# Patient Record
Sex: MALE | Race: BLACK OR AFRICAN AMERICAN | Employment: FULL TIME | URBAN - METROPOLITAN AREA
[De-identification: names, ages, dates, MRNs, and addresses within clinical notes are randomized per-mention and may not be internally consistent; named-entity substitution may affect disease eponyms.]

---

## 2017-05-15 ENCOUNTER — HOSPITAL ENCOUNTER (INPATIENT)
Facility: CLINIC | Age: 27
LOS: 1 days | Discharge: HOME | DRG: 882 | End: 2017-05-16
Attending: EMERGENCY MEDICINE | Admitting: PSYCHIATRY & NEUROLOGY
Payer: MEDICAID

## 2017-05-15 ENCOUNTER — TELEPHONE (OUTPATIENT)
Dept: BEHAVIORAL HEALTH | Facility: CLINIC | Age: 27
End: 2017-05-15

## 2017-05-15 DIAGNOSIS — R45.851 SUICIDAL IDEATION: ICD-10-CM

## 2017-05-15 LAB
AMPHETAMINES UR QL SCN: ABNORMAL
ANION GAP SERPL CALCULATED.3IONS-SCNC: 6 MMOL/L (ref 3–14)
APAP SERPL-MCNC: NORMAL MG/L (ref 10–20)
BARBITURATES UR QL: ABNORMAL
BASOPHILS # BLD AUTO: 0 10E9/L (ref 0–0.2)
BASOPHILS NFR BLD AUTO: 0.5 %
BENZODIAZ UR QL: ABNORMAL
BUN SERPL-MCNC: 8 MG/DL (ref 7–30)
CALCIUM SERPL-MCNC: 8.7 MG/DL (ref 8.5–10.1)
CANNABINOIDS UR QL SCN: ABNORMAL
CHLORIDE SERPL-SCNC: 107 MMOL/L (ref 94–109)
CO2 SERPL-SCNC: 28 MMOL/L (ref 20–32)
COCAINE UR QL: ABNORMAL
CREAT SERPL-MCNC: 0.82 MG/DL (ref 0.66–1.25)
DIFFERENTIAL METHOD BLD: ABNORMAL
EOSINOPHIL # BLD AUTO: 0 10E9/L (ref 0–0.7)
EOSINOPHIL NFR BLD AUTO: 0.6 %
ERYTHROCYTE [DISTWIDTH] IN BLOOD BY AUTOMATED COUNT: 14.8 % (ref 10–15)
ETHANOL SERPL-MCNC: 0.11 G/DL
GFR SERPL CREATININE-BSD FRML MDRD: NORMAL ML/MIN/1.7M2
GLUCOSE SERPL-MCNC: 85 MG/DL (ref 70–99)
HCT VFR BLD AUTO: 36.8 % (ref 40–53)
HGB BLD-MCNC: 12.6 G/DL (ref 13.3–17.7)
IMM GRANULOCYTES # BLD: 0 10E9/L (ref 0–0.4)
IMM GRANULOCYTES NFR BLD: 0.3 %
INTERPRETATION ECG - MUSE: NORMAL
LYMPHOCYTES # BLD AUTO: 2 10E9/L (ref 0.8–5.3)
LYMPHOCYTES NFR BLD AUTO: 31.1 %
MCH RBC QN AUTO: 30.6 PG (ref 26.5–33)
MCHC RBC AUTO-ENTMCNC: 34.2 G/DL (ref 31.5–36.5)
MCV RBC AUTO: 89 FL (ref 78–100)
MONOCYTES # BLD AUTO: 0.3 10E9/L (ref 0–1.3)
MONOCYTES NFR BLD AUTO: 4.2 %
NEUTROPHILS # BLD AUTO: 4 10E9/L (ref 1.6–8.3)
NEUTROPHILS NFR BLD AUTO: 63.3 %
NRBC # BLD AUTO: 0 10*3/UL
NRBC BLD AUTO-RTO: 0 /100
OPIATES UR QL SCN: ABNORMAL
PCP UR QL SCN: ABNORMAL
PLATELET # BLD AUTO: 337 10E9/L (ref 150–450)
POTASSIUM SERPL-SCNC: 4 MMOL/L (ref 3.4–5.3)
RBC # BLD AUTO: 4.12 10E12/L (ref 4.4–5.9)
SODIUM SERPL-SCNC: 141 MMOL/L (ref 133–144)
TSH SERPL DL<=0.005 MIU/L-ACNC: 0.5 MU/L (ref 0.4–4)
WBC # BLD AUTO: 6.4 10E9/L (ref 4–11)

## 2017-05-15 PROCEDURE — 80307 DRUG TEST PRSMV CHEM ANLYZR: CPT | Performed by: EMERGENCY MEDICINE

## 2017-05-15 PROCEDURE — 25000132 ZZH RX MED GY IP 250 OP 250 PS 637: Performed by: PSYCHIATRY & NEUROLOGY

## 2017-05-15 PROCEDURE — 12400006 ZZH R&B MH INTERMEDIATE

## 2017-05-15 PROCEDURE — 84443 ASSAY THYROID STIM HORMONE: CPT | Performed by: EMERGENCY MEDICINE

## 2017-05-15 PROCEDURE — 93005 ELECTROCARDIOGRAM TRACING: CPT

## 2017-05-15 PROCEDURE — 80329 ANALGESICS NON-OPIOID 1 OR 2: CPT | Performed by: EMERGENCY MEDICINE

## 2017-05-15 PROCEDURE — 99285 EMERGENCY DEPT VISIT HI MDM: CPT | Mod: 25

## 2017-05-15 PROCEDURE — 80320 DRUG SCREEN QUANTALCOHOLS: CPT | Performed by: EMERGENCY MEDICINE

## 2017-05-15 PROCEDURE — 80048 BASIC METABOLIC PNL TOTAL CA: CPT | Performed by: EMERGENCY MEDICINE

## 2017-05-15 PROCEDURE — 90791 PSYCH DIAGNOSTIC EVALUATION: CPT

## 2017-05-15 PROCEDURE — 85025 COMPLETE CBC W/AUTO DIFF WBC: CPT | Performed by: EMERGENCY MEDICINE

## 2017-05-15 RX ORDER — HYDROXYZINE HYDROCHLORIDE 25 MG/1
25-50 TABLET, FILM COATED ORAL EVERY 4 HOURS PRN
Status: DISCONTINUED | OUTPATIENT
Start: 2017-05-15 | End: 2017-05-16 | Stop reason: HOSPADM

## 2017-05-15 RX ORDER — TRAZODONE HYDROCHLORIDE 50 MG/1
50-100 TABLET, FILM COATED ORAL
Status: DISCONTINUED | OUTPATIENT
Start: 2017-05-15 | End: 2017-05-16 | Stop reason: HOSPADM

## 2017-05-15 RX ORDER — ACETAMINOPHEN 325 MG/1
650 TABLET ORAL EVERY 4 HOURS PRN
Status: DISCONTINUED | OUTPATIENT
Start: 2017-05-15 | End: 2017-05-16 | Stop reason: HOSPADM

## 2017-05-15 RX ADMIN — HYDROXYZINE HYDROCHLORIDE 50 MG: 25 TABLET ORAL at 18:51

## 2017-05-15 RX ADMIN — ACETAMINOPHEN 650 MG: 325 TABLET, FILM COATED ORAL at 19:19

## 2017-05-15 NOTE — ED PROVIDER NOTES
"  History     Chief Complaint:  Psychiatric Evaluation      The history is provided by the EMS personnel and the patient. History limited by: patient not providing history.      Uri Valladares is a 26 year old male with history of anxiety and depression who presents via EMS for psychiatric evaluation.  Per EMS patient contacted his mother and told her he was feeling suicidal so she contacted the police department.  Here in the emergency department patient will not answer questions or provide any history.  He does nod his head yes when asked if suicidal and no when asked if he has harmed himself.  No additional history obtainable at this time. Mother did not present to the emergency department.     Allergies:  No known drug allergies      Medications:    The patient is not currently taking any prescribed medications.     Past Medical History:    Anxiety  Depression  Marijuana abuse  Facial bone fractures    Past Surgical History:    History reviewed. No pertinent surgical history.     Family History:    History reviewed. No pertinent family history.      Social History:  Presents via EMS   Tobacco use: 0.50 PPD  Alcohol use: 4x weekly  PCP: [facility]    Marital Status:  Single        Review of Systems   Psychiatric/Behavioral: Positive for suicidal ideas. Negative for self-injury.       Physical Exam     Patient Vitals for the past 24 hrs:   BP Temp Temp src Pulse Heart Rate Resp SpO2 Height Weight   05/15/17 1652 132/76 97.8  F (36.6  C) Oral 65 - 16 - 1.905 m (6' 3\") 67.8 kg (149 lb 8 oz)   05/15/17 1039 119/72 98.5  F (36.9  C) Oral - 78 14 100 % - -       Physical Exam  General: Resting comfortably on the gurney, puts a pillow over his head  Eyes:  The pupils are equal and round  ENT:    Moist mucous membranes  Neck:  Normal range of motion  CV:  Regular rate and rhythm    Skin warm and well perfused   Resp:  Lungs are clear    Non-labored    No rales    No wheezing  MS:  Normal muscular " tone  Skin:  No rash or acute skin lesions noted  Neuro:   Awake, alert.      Minimal speech - wouldn't talk    Face is symmetric.     Moves all extremities  Psych:  Flat affect.  Appropriate interactions.     Emergency Department Course   ECG (11:19:56):  Rate 82 bpm. VA interval 178. QRS duration 90. QT/QTc 356/415. P-R-T axes 76 32 55. NSR.  Normal ECG.  Agree with computer.  No significant change when compared to EKG dated 5/4/16.  Interpreted at 1127 by Yoanna Rodriguez MD.    Laboratory:  BMP: WNL (Creatinine 0.82)     Acetaminophen level: <2  ETOH: 0.11 (H)     Emergency Department Course:  The patient arrived in the emergency department via EMS.  Past medical records, nursing notes, and vitals reviewed.  1050: I performed an exam of the patient and obtained history as documented above. GCS 15   Above workup undertaken.  1400: I rechecked the patient. Explained findings to patient. He continues to refuse to speak with me but will speak with DEC.  I had DEC evaluate the patient.   I personally reviewed the laboratory results with the Patient and answered all related questions prior to admission.   Patient will be admitted to a psychiatric bed under the care of Dr. Welch.      Impression & Plan    Medical Decision Making:  Uri Valladares is a 26 year old male with history of depression and anxiety who presents to the emergency department with suicidal thoughts.  Patient has an unremarkable physical exam and normal vital signs but he is minimally communicative here in the emergency department.  He is alert and will nod his head yes or no to some questions though will not answer any of the questions with words.  He did nod his head yes to being suicidal and did nod his head no to any attempt at harming himself.  Laboratory values obtained to rule out possible ingestion given minimal communication and his alcohol level was elevated at 0.11.  Patient reevaluated a few hours later and continues to not  give me any information though said he will speak to DEC at this time.  DEC able to evaluate him and again did not speak very much but did give enough information that DEC felt he should be admitted. He continues to nod his head yes to being suicidal. Flat affect, minimal communication, concern for severe depression and suicidal thoughts.  Will admit patient to inpatient psychiatry.        Diagnosis:    ICD-10-CM    1. Suicidal ideation R45.851      Disposition:  Patient will be admitted to a psychiatric bed under the care of Dr. Welch.      Forrest Stiles  5/15/2017    EMERGENCY DEPARTMENT    I, Forrest Stiles, am serving as a scribe at 11:25 AM on 5/15/2017 to document services personally performed by Yoanna Rodriguez MD based on my observations and the provider's statements to me.       Yoanna Rodriguez MD  05/15/17 1808

## 2017-05-15 NOTE — PROGRESS NOTES
05/15/17 1720   Patient Belongings   Patient Belongings other (see comments)   Disposition of Belongings searched and placed in patient's locker   Belongings Search Yes   Clothing Search Yes   Second Staff no     1 black Nike hat  1 grey t-shirt  1 maroon sweatshirt  1 black jeans   1   A pair of black shoes with laces  1 pair of socks  1 key and garage button  3 cigarettes  1 lighter  [place] 's Licence  2 brown leather wallets  Business cards  Loose change (coins)  iphone 6 plus        Security Envelope #[id number]  Social security card   [employer] Visa *[phone]  [employer] Visa *[phone]  [employer] Visa *[phone]  [employer] Visa *[phone]  [employer] Wal[employer] Master Card *[phone]    ADMISSION:  I am responsible for any personal items that are not sent to the safe or pharmacy. [facility] is not responsible for loss, theft or damage of any property in my possession.    Patient Signature _____________________ Date/Time _____________________    Staff Signature _______________________ Date/Time _____________________    [facility] Staff person, if patient is unable/unwilling to sign  ___________________________________ Date/Time _____________________    DISCHARGE:  My personal items have been returned to me.   Patient Signature _____________________ Date/Time _____________________

## 2017-05-15 NOTE — ED NOTES
Patient is now angry that he is going to be admitted. He denies suicidal thoughts now. He was informed of 72 hour hold status and says he does not think this is right when all he did was refuse to answer questions. Given copy of patient bill of rights. Patient also ate a turkey sandwich meal prior to transport to unit 77.

## 2017-05-15 NOTE — TELEPHONE ENCOUNTER
S - Jose CLARK  calling with clinical for a 25 yo male who is suicidal.      B - Pt is depressed, suicidal.  Pt's mother who lives in [place] called 911 because she was concerned he was going to kill himself.  Police found him curled up on the floor.  Pt was initially not responding to  in ED, just nodded when asked if he wanted to die.   did get some information from pt.  Pt has been depressed for a while.  Pt denies being hospitalized before for psych.  Pt wouldn't disclose if he had a suicide plan.  Pt admitted he would rather be dead.  Pt did say that he quit going to work 2 weeks ago, wouldn't state why.  Pt only really answering in yes/no format to assessment.  Pt had PAM 0.11 (updated value) upon admission.  Unsure if patient drinking on regular basis.      A - pt will be placed on hold when placement found per Jose Welch [facility]

## 2017-05-15 NOTE — IP AVS SNAPSHOT
MRN:[id number]                      After Visit Summary   5/15/2017    Uri Valladares    MRN: [id number]           Thank you!     Thank you for choosing [facility] for your care. Our goal is always to provide you with excellent care.        Patient Information     Date Of Birth          1990        Designated Caregiver       Most Recent Value    Caregiver    Will someone help with your care after discharge? no      About your hospital stay     You were admitted on:  May 15, 2017 You last received care in the:  [facility]    You were discharged on:  May 16, 2017       Who to Call     For medical emergencies, please call 911.  For non-urgent questions about your medical care, please call your primary care provider or clinic, [phone]          Attending Provider     Provider Specialty    Yoanna Rodriguez MD Emergency Medicine    [facility]       Primary Care Provider Office Phone # Fax #    [facility] [phone] [address] [facility] [address]        Further instructions from your care team       Behavioral Discharge Planning and Instructions    Summary: Admitted to hospital with suicidal ideation.    Main Diagnosis: Depression, NOS and polysubstance use disorder     Major Treatments, Procedures and Findings: psychiatric assessment    Symptoms to Report: feeling more aggressive, increased confusion, mood getting worse or thoughts of suicide    Lifestyle Adjustment: Develop and follow safety plan. Abstain from use of alcohol and chemicals- if unable, seek a rule 25 chemical dependency evaluation.   Follow through with counseling to help you cope with loss and plan for your future.    Psychiatry Follow-up:     [address] [address] [phone] fax: [phone]  Or  [facility]  [address] [place] [address] [address]  [place], [place]   "[address]  Phone:  [phone] / Fax:  [phone]    If your MA is in place and you have an MA number, you can make an appointment at any clinic. If you have no proof of insurance, you may ask them to screen you for [facility], then once you have the proof of insurance, can continue on that basis.    Another idea is to call STEPHENIE ([place] on [place] Illness) and ask to be connected to a support group: 632.336.7889.      Rule 25 evaluation for chemical health is available through [facility]- call  for information and scheduling.    Resources:   Crisis Intervention: 991.596.4053 or 835-991-5141 (TTY: 745.378.6232).  Call anytime for help.  [place] [employer] [place]ness (www.[place].stephenie.org): 602.805.3078 or 578-508-5824.  Alcoholics Anonymous (www.alcoholics-anonymous.org): Check your phone book for your local chapter.  National Suicide Prevention Line (www.mentalhealthmn.org): 057-938-IOGK (0106)    General Medication Instructions:   See your medication sheet(s) for instructions.   Take all medicines as directed.  Make no changes unless your doctor suggests them.   Go to all your doctor visits.  Be sure to have all your required lab tests. This way, your medicines can be refilled on time.  Do not use any drugs not prescribed by your doctor.  Avoid alcohol.      Pending Results     No orders found for last 3 day(s).            Statement of Approval     Ordered          05/16/17 9125  I have reviewed and agree with all the recommendations and orders detailed in this document.  EFFECTIVE NOW     Approved and electronically signed by:  Ivan Mujica MD             Admission Information     Date & Time Provider Department Dept. Phone    5/15/2017 Ivan Mujica MD [facility] [phone]      Your Vitals Were     Blood Pressure Pulse Temperature Respirations Height Weight    127/86 61 97.6  F (36.4  C) (Oral) 16 1.905 m (6' 3\") 67.8 kg (149 lb 8 oz)    Pulse " "Oximetry BMI (Body Mass Index)                100% 18.69 kg/m2          MyChart Information     [employer] lets you send messages to your doctor, view your test results, renew your prescriptions, schedule appointments and more. To sign up, go to [email or web address] . Click on \"Log in\" on the left side of the screen, which will take you to the Welcome page. Then click on \"Sign up Now\" on the right side of the page.     You will be asked to enter the access code listed below, as well as some personal information. Please follow the directions to create your username and password.     Your access code is: [email or web address]  Expires: 2017 11:19 AM     Your access code will  in 90 days. If you need help or a new code, please call your [facility] [facility] clinic or [phone].        Care EveryWhere ID     This is your Care EveryWhere ID. This could be used by other organizations to access your [facility] [place] [facility] medical records  [id number]           Review of your medicines      Notice     You have not been prescribed any medications.             Protect others around you: Learn how to safely use, store and throw away your medicines at www.disposemymeds.org.             Medication List: This is a list of all your medications and when to take them. Check marks below indicate your daily home schedule. Keep this list as a reference.      Notice     You have not been prescribed any medications.      "

## 2017-05-15 NOTE — PLAN OF CARE
"Problem: Depressive Symptoms  Goal: Depressive Symptoms  Signs and symptoms of listed problems will be absent or manageable.   26 year old male received from the ER on a 72 hour hold due to Depression with suicidal ideation. Pt. angry about being admitted.Very evasive and inconsistent while providing history during admit interview. Reports that he has periods of confusion . Reports issues with short term memory loss. Long delays before answering certain questions. Reports that he lives alone but later states he lives with on/off girlfriend and his child. Reports \"not working\" for \"a long time\" later reporting he \"lost\" his job a couple of weeks ago. Does admit to depression and suicidal ideation. Denies current plan. Does admit to 1 previous attempt - 1 year ago but states \" I knew I didn't take a lethal dose of the medication\". Reports trouble achieving and maintaining sleep. Reports recent lack of appetite with unknown amount of weight loss. Reports lack of support system-extended family in [place].  Admits to alcohol and marijuana use-denies cocaine use despite urine toxicology being positive for cocaine.     Nursing assessment complete including patient and medication profiles. Risk assessments completed addressing suicide,fall,skin,nutrition and safety issues. Care plan initiated. Assessments reviewed with physician and admit orders received. Welcome packet reviewed with patient. Information reviewed includes getting emergency help, preventing infections, understanding your care, using medication safely, reducing falls, preventing pressure ulcers, smoking cessation, powerful choices and Patients Bill of Rights. Pt. given tour of the unit and instruction on use of facility including emergency call light. Program schedule reviewed with patient. Questions regarding the unit addressed. Pt. Search completed and belongings inventoried.                    "

## 2017-05-15 NOTE — PHARMACY-ADMISSION MEDICATION HISTORY
Admission Medication History    Admission medication history interview status for the 5/15/2017 admission is complete.    Patient reports using no medications prior to this admission.      Kody Patrick, LorenzoD

## 2017-05-15 NOTE — ED NOTES
"Patient says he is unable to provide urine sample. Given some water to drink. He then came out and asked to speak to nurse and said \"I am ready to go home.\"   "

## 2017-05-15 NOTE — ED NOTES
Bed: [facility]  Expected date:   Expected time:   Means of arrival:   Comments:  [place] - 19's psych eval on hold eta 1030

## 2017-05-15 NOTE — IP AVS SNAPSHOT
[place] [address] [place] [address]    Phone:  [phone]                                       After Visit Summary   5/15/2017    Uri Valladares    MRN: [id number]           After Visit Summary Signature Page     I have received my discharge instructions, and my questions have been answered. I have discussed any challenges I see with this plan with the nurse or doctor.    ..........................................................................................................................................  Patient/Patient Representative Signature      ..........................................................................................................................................  Patient Representative Print Name and Relationship to Patient    ..................................................               ................................................  Date                                            Time    ..........................................................................................................................................  Reviewed by Signature/Title    ...................................................              ..............................................  Date                                                            Time

## 2017-05-16 VITALS
DIASTOLIC BLOOD PRESSURE: 86 MMHG | HEART RATE: 61 BPM | RESPIRATION RATE: 16 BRPM | OXYGEN SATURATION: 100 % | HEIGHT: 75 IN | BODY MASS INDEX: 18.59 KG/M2 | TEMPERATURE: 97.6 F | WEIGHT: 149.5 LBS | SYSTOLIC BLOOD PRESSURE: 127 MMHG

## 2017-05-16 PROCEDURE — 99207 ZZC NO CHARGE VISIT/PATIENT NOT SEEN: CPT | Performed by: PHYSICIAN ASSISTANT

## 2017-05-16 NOTE — H&P
"[facility] Psychiatric H&P Note       Initial History   The patient's care was discussed with the treatment team and chart notes were reviewed.     Patient examined for psychiatric admission.     IDENTIFICATION    Patient is a 26 year old single male with one nine month old son. Pt sees PCP Varsha Fitch. He has never seen a psychiatrist or therapist. Pt seen [facility] for depression with SI.    HISTORY OF PRESENT ILLNESS    Uri Valladares is a 26 year old male with history of anxiety and depression who presented to the [facility] ED via EMS for psychiatric evaluation. Per EMS pt contacted his mother and told her he was feeling suicidal so she contacted the police department. On interview, stated that his mother contacted 911 and that he did not actually want to complete suicide. He reports that he was alone in his apartment and crying on the phone to her, prompting her to contact the police. Pt has severely depressed mood, motivation poor, concentration poor, low energy, worthless, sadness, low appetite, low weight without SI. No prior suicide attempts. He endorses poor sleep architecture, issues falling asleep. Pt is an anxious person, a worrier, gets headaches from his anxiety. Pt confirms having panic attacks, but he cannot describe them as it was a while ago. Pt has had a manic episode with elevated and expansive mood, a surplus of energy, grandiosity, decreased need for sleep, pressured speech, flight of ideas, increase in goal-oriented activity, increase in psychomotor agitation all of which were to the point of psychosocial impairment. Pt endorses that episode was not secondary to chemical use and lasted at least one week in time. He denies hallucinations or past trauma. He also reports that his fiance of two years left a week ago, likely exacerbating his depression.    CHEMICAL DEPENDENCY HISTORY    He states that he drinks a \"couple shots\" of alcohol every other day. He has thought of " "cutting back on his alcohol use. Some nicotine use. He claims he does not use any other illicit substances. However, utox positive for cocaine, cannabis, and EtOH of 0.11 on admission. He states that he has been using marijuana at least once a week and used cocaine the night before admission.    PAST  PSYCHIATRIC HISTORY    He has never seen a psychiatrist. He has never taken psychotropic medications.    FAMILY HISTORY    Unknown, \"I have no idea.\"    SOCIAL HISTORY    Pt grew up in [place]. He then moved to [place] and [place]. He has two older brothers. He was raised by his mother. He does not have a relationship with his father. He is currently unemployed, he states that he is mainly a . He has a nine month son and is involved with his care. He is now single, his fiance left with his child a week ago.     Medications     No prescriptions prior to admission.       Scheduled Medications:     PRNs:  hydrOXYzine, traZODone, acetaminophen      Allergies    No Known Allergies     Previous Medical History     Past Medical History:   Diagnosis Date     Depressive disorder         Medical Review of Systems   /86  Pulse 61  Temp 97.6  F (36.4  C) (Oral)  Resp 16  Ht 1.905 m (6' 3\")  Wt 67.8 kg (149 lb 8 oz)  SpO2 100%  BMI 18.69 kg/m2  Body mass index is 18.69 kg/(m^2).  Previous 10-point ROS completed by Yoanna Rodriguez MD on 5/15/17 reviewed by Ivan Mujica MD on May 16, 2017 and is unchanged except for those problems mentioned within the HPI.     Mental Status Examination     Appearance Lying in bed, dressed in scrubs. Appears stated age.   Attitude Cooperative   Orientation Oriented to person, place, time   Eye Contact Poor   Speech Regular rate, rhythm, volume and tone   Language Normal   Psychomotor Behavior Normal   Mood Depressed   Affect Flat   Thought Process Goal-Oriented, Intact   Associations Intact   Thought Content Patient is currently negative for suicide ideation, " negative for plan or intent, able to contract no self harm and identify barriers to suicide.  Negative for obsessions, compulsions or psychosis.      Fund of Knowledge Average   Insight Fair   Judgement Fair   Attention Span & Concentration Alert   Recent & Remote Memory Intact   Gait Normal      Labs   Labs reviewed.  Recent Results (from the past 24 hour(s))   EKG 12 lead    Collection Time: 05/15/17 11:19 AM   Result Value Ref Range    Interpretation ECG Click View Image link to view waveform and result    Basic metabolic panel    Collection Time: 05/15/17 11:27 AM   Result Value Ref Range    Sodium 141 133 - 144 mmol/L    Potassium 4.0 3.4 - 5.3 mmol/L    Chloride 107 94 - 109 mmol/L    Carbon Dioxide 28 20 - 32 mmol/L    Anion Gap 6 3 - 14 mmol/L    Glucose 85 70 - 99 mg/dL    Urea Nitrogen 8 7 - 30 mg/dL    Creatinine 0.82 0.66 - 1.25 mg/dL    GFR Estimate >90  Non  GFR Calc   >60 mL/min/1.7m2    GFR Estimate If Black >90   GFR Calc   >60 mL/min/1.7m2    Calcium 8.7 8.5 - 10.1 mg/dL   Acetaminophen level    Collection Time: 05/15/17 11:27 AM   Result Value Ref Range    Acetaminophen Level <2  Therapeutic range: 10-20 mg/L   mg/L   Alcohol level blood    Collection Time: 05/15/17 11:27 AM   Result Value Ref Range    Ethanol g/dL 0.11 (H) <0.01 g/dL   CBC with platelets differential    Collection Time: 05/15/17 11:27 AM   Result Value Ref Range    WBC 6.4 4.0 - 11.0 10e9/L    RBC Count 4.12 (L) 4.4 - 5.9 10e12/L    Hemoglobin 12.6 (L) 13.3 - 17.7 g/dL    Hematocrit 36.8 (L) 40.0 - 53.0 %    MCV 89 78 - 100 fl    MCH 30.6 26.5 - 33.0 pg    MCHC 34.2 31.5 - 36.5 g/dL    RDW 14.8 10.0 - 15.0 %    Platelet Count 337 150 - 450 10e9/L    Diff Method Automated Method     % Neutrophils 63.3 %    % Lymphocytes 31.1 %    % Monocytes 4.2 %    % Eosinophils 0.6 %    % Basophils 0.5 %    % Immature Granulocytes 0.3 %    Nucleated RBCs 0 0 /100    Absolute Neutrophil 4.0 1.6 - 8.3 10e9/L     Absolute Lymphocytes 2.0 0.8 - 5.3 10e9/L    Absolute Monocytes 0.3 0.0 - 1.3 10e9/L    Absolute Eosinophils 0.0 0.0 - 0.7 10e9/L    Absolute Basophils 0.0 0.0 - 0.2 10e9/L    Abs Immature Granulocytes 0.0 0 - 0.4 10e9/L    Absolute Nucleated RBC 0.0    TSH with free T4 reflex    Collection Time: 05/15/17 11:27 AM   Result Value Ref Range    TSH 0.50 0.40 - 4.00 mU/L   Drug abuse screen urine    Collection Time: 05/15/17  4:15 PM   Result Value Ref Range    Amphetamine Qual Urine  NEG     Negative   Cutoff for a negative amphetamine is 500 ng/mL or less.      Barbiturates Qual Urine  NEG     Negative   Cutoff for a negative barbiturate is 200 ng/mL or less.      Benzodiazepine Qual Urine  NEG     Negative   Cutoff for a negative benzodiazepine is 200 ng/mL or less.      Cannabinoids Qual Urine (A) NEG     Positive   Cutoff for a positive cannabinoid is greater than 50 ng/mL. This is an   unconfirmed screening result to be used for medical purposes only.      Cocaine Qual Urine (A) NEG     Positive   Cutoff for a positive cocaine is greater than 300 ng/mL. This is an unconfirmed   screening result to be used for medical purposes only.      Opiates Qualitative Urine  NEG     Negative   Cutoff for a negative opiate is 300 ng/mL or less.      PCP Qual Urine  NEG     Negative   Cutoff for a negative PCP is 25 ng/mL or less.          Impression   This is a 26 year old male with adjustment disorder with mixed anxiety and depressed mood and cannabis use disorder, alcohol use disorder, rule out cocaine use disorder. Dr. Mujica reviewed the side effects and complications of cannabis, particularly for an individual with a mental illness. Pt acknowledged. He does not believe that he uses chemicals to help with his depression. He states that his mother would like him to move to [place] with the rest of his family. Attempted to contact pt's mother, she did not answer her phone. Denies suicidal or homicidal ideation. Pt to  discharge today. Strongly encouraged pt to abstain from chemicals and attend AA meetings.   Diagnoses   1. Adjustment Disorder with Mixed Anxiety and Depressed Mood.  2. Cannabis Use Disorder, severe.  3. Alcohol Use Disorder, severe.  4. R/O Cocaine Use Disorder.     Plan     1. Explained side effects, benefits, and complications of medications to the patient, Pt gave verbal consent.  2. Medication changes: None.  3. Discussed treatment plan with patient and team.  4. Projected length of stay: Pt to discharge today.     Attestation:   Patient has been seen and evaluated by me, Ivan Mujica MD.    Patient ID:  Name: Uri Valladares  MRN: [id number]  Admission: 5/15/2017   YOB: 1990

## 2017-05-16 NOTE — PROGRESS NOTES
Per nursing, pt discharging home today. Chart reviewed, PMHx of depression, anxiety. Vitals WNL. Hgb 12.6, remainder of CBC, BMP, and TSH unremarkable. UDS positive for cocaine and cannabis, ETOH 0.11 on admission. Will defer formal H&P as pt is in the midst of discharge planning per primary service. If any acute medical issues should arise, happy to consult.

## 2017-05-16 NOTE — PROGRESS NOTES
Discharge teaching done. Pt denied SI.  Pt verbalized understanding of medications and out pt f/u [place] plan. No medications ordered. Belongings returned to pt at discharge. Pt discharged to home. Pt meeting his ride at [facility].

## 2017-05-16 NOTE — DISCHARGE INSTRUCTIONS
Behavioral Discharge Planning and Instructions    Summary: Admitted to hospital with suicidal ideation.    Main Diagnosis: Depression, NOS and polysubstance use disorder     Major Treatments, Procedures and Findings: psychiatric assessment    Symptoms to Report: feeling more aggressive, increased confusion, mood getting worse or thoughts of suicide    Lifestyle Adjustment: Develop and follow safety plan. Abstain from use of alcohol and chemicals- if unable, seek a rule 25 chemical dependency evaluation.   Follow through with counseling to help you cope with loss and plan for your future.    Psychiatry Follow-up:     [facility]  [address] [address] [phone] fax: [phone]  Or  [facility]  [address] [place] [address] [address]  [place]  [address]  Phone:  [phone] / Fax:  [phone]    If your MA is in place and you have an MA number, you can make an appointment at any clinic. If you have no proof of insurance, you may ask them to screen you for [facility], then once you have the proof of insurance, can continue on that basis.    Another idea is to call STEPHENIE ([employer]ness) and ask to be connected to a support group: [phone].      Rule 25 evaluation for chemical health is available through [facility]- call  for information and scheduling.    Resources:   Crisis Intervention: 558.934.2170 or 785-651-5185 (TTY: 669.320.3362).  Call anytime for help.  [employer] [place] [employer]ness (www.[place].stephenie.org): 483.839.5420 or 477-706-5196.  Alcoholics Anonymous (www.alcoholics-anonymous.org): Check your phone book for your local chapter.  National Suicide Prevention Line (www.mentalhealthmn.org): 496-736-LAQB (8954)    General Medication Instructions:   See your medication sheet(s) for instructions.   Take all medicines as directed.  Make no changes unless your doctor suggests them.   Go to all your doctor visits.  Be  sure to have all your required lab tests. This way, your medicines can be refilled on time.  Do not use any drugs not prescribed by your doctor.  Avoid alcohol.

## 2017-07-17 ENCOUNTER — HOSPITAL ENCOUNTER (EMERGENCY)
Facility: CLINIC | Age: 27
Discharge: HOME | End: 2017-07-17
Attending: EMERGENCY MEDICINE | Admitting: EMERGENCY MEDICINE
Payer: MEDICAID

## 2017-07-17 VITALS
HEIGHT: 74 IN | OXYGEN SATURATION: 100 % | RESPIRATION RATE: 16 BRPM | SYSTOLIC BLOOD PRESSURE: 134 MMHG | DIASTOLIC BLOOD PRESSURE: 86 MMHG | WEIGHT: 167 LBS | TEMPERATURE: 97.4 F | BODY MASS INDEX: 21.43 KG/M2

## 2017-07-17 DIAGNOSIS — K12.0 ULCER APHTHOUS ORAL: ICD-10-CM

## 2017-07-17 PROCEDURE — 87529 HSV DNA AMP PROBE: CPT | Performed by: EMERGENCY MEDICINE

## 2017-07-17 PROCEDURE — 99282 EMERGENCY DEPT VISIT SF MDM: CPT

## 2017-07-17 NOTE — ED AVS SNAPSHOT
[facility]    [address] [place] [address]    Phone:  [phone]    Fax:  [phone]                                       Uri Valladares   MRN: [id number]    Department:  [facility]   Date of Visit:  7/17/2017           Patient Information     Date Of Birth          1990        Your diagnoses for this visit were:     Ulcer aphthous oral        You were seen by Jessee South MD.      Follow-up Information     Follow up with Clinic, Varsha Rodriguez.    Contact information:    [address] [facility] [address]  [phone]          Discharge Instructions         Canker Sore    A canker sore (also called an aphthous ulcer) is a painful sore on the lining of the mouth. It is most painful during the first few days, and it lasts about 7 to 14 days before going away.  Causes  Canker sores are not cold sores or fever blisters. They are not contagious, so they are not spread by contact. The exact cause of canker sores is not clear, but there are a number of things that can trigger them in different people.    Mild injury, such as biting the inside of the mouth, lip, or cheek, or dental procedures    Stress    Poor diet, or lack of certain nutrients, including B vitamins and iron    Foods that can irritate the mouth, including tomatoes, citrus fruits, and some nuts (foods that are acidic or contain bitter substances called tannins)    Irritating chemicals, such as those in some toothpastes and mouthwashes    Certain chronic illnesses  Symptoms  Canker sores are found on the lining of the mouth. They can be inside the cheeks or lips, on the roof of the mouth, at the base of the gums, on the tongue, or in the back of the throat. Canker sores typically have these characteristics:    Small, flat (not raised) sores    Can be white or yellowish bumps that are red around the edges or have a red halo    Usually  small in size, roundish, and in groups    Accompanied by pain or burning  Canker sores do not leave a scar. But they usually come back.  Home care  The goals of canker sore treatment are to decrease the pain, speed healing, and prevent recurrence. No single treatment works for everyone. Try a number of techniques to see what works best.  General care    You may find that soft, easy-to-chew foods cause less pain. Use a straw to direct liquids away from the sore.    Use a soft-bristle toothbrush, and brush your teeth gently.    Avoid acidic, salty, or spicy foods.    Avoid injuring the inside of your mouth, or scraping your existing canker sores, by avoiding crusty and crunchy foods like french bread and chips.  Medicines  You can try over-the-counter medicines that cover the sores and numb them. This protects the sores while they heal and helps reduce pain.  Homemade rinses and solutions  You can use these solutions as mouth rinses. Spit them out after using them. You can also dab them on the sores. You can repeat these treatments as often as needed.    Rinse your mouth with saltwater.    Mix equal amounts of hydrogen peroxide and water. You can use it as a mouthwash or dab it on spots with a cotton swab. You can also add sodium bicarbonate to this to make a paste, and then dab it on spots.  Follow-up care  Follow up with your healthcare provider, or as advised.    If a culture was done, you will be notified if the treatment needs to be changed. You can call as directed for the results.  Call 911  Contact emergency services if any of these occur:    Trouble breathing    Inability to swallow    Extreme drowsiness or trouble awakening    Fainting or loss of consciousness    Rapid heart rate    Seizure    Stiff neck  When to seek medical advice  Call your healthcare provider right away if any of these occur:    You have a fever of 100.4 F (38 C) or higher.    You are pregnant.    You just had surgery or another medical  procedure, or you were just discharged from the hospital.    You are unable to eat or swallow due to pain.  Date Last Reviewed: 7/30/2015 2000-2017 [employer]. [address] [place] [address], [place] [address]. All rights reserved. This information is not intended as a substitute for professional medical care. Always follow your healthcare professional's instructions.          Understanding Canker Sores  Canker sores are small, painful sores inside the mouth. They occur most often on the tongue, gums, or insides of the cheeks. The medical term for canker sores is aphthous ulcers.  What causes a canker sore?  The exact cause of canker sores is not known, but they are linked to a number of conditions. These include:    An injury or irritation in the mouth, such as biting the inside of your cheek or braces rubbing    Allergy or sensitivity to certain foods or substances, such as citrus juice or some kinds of toothpaste    Poor nutrition    Emotional stress    Certain infections and illnesses  Canker sores tend to run in families.  What are the symptoms of a canker sore?  These are some common traits of canker sores:    Sores are open and grayish-yellow, surrounded by redness.    Sores are usually painful and sensitive to touch.    Canker sores may be preceded by a burning or tingling sensation a few hours to a few days before the sore appears.    Children and teens tend to get canker sores more often than adults.  How are canker sores treated?  Canker sores usually go away by themselves within 10 to 14 days. There is no cure for canker sores. Treatment focuses on relieving symptoms and shortening outbreaks. Treatments may include:    Prescription or over-the-counter skin treatments to apply to the sores. Steroids for your skin (topical) may protect the canker sores from further irritation and allow them to heal. Topical pain relief medicines may numb the area and make the sores less painful.    Certain types  of toothpaste. These do not contain sodium lauryl sulfate. This type of toothpaste may prevent further aggravation of canker sores.    Oral prescription medicines. These are used for severe cases to help relieve symptoms.    Prescription or over-the-counter pain medicines. These help with discomfort.  What are the complications of a canker sore?  Mouth sores that seem to be canker sores can be signs of a more serious illness. If you have other signs of illness along with mouth sores, you should talk with a healthcare provider. Canker sores can be so painful that they interfere with talking, eating, or drinking.  When should I call my healthcare provider?  Call your healthcare provider right away if you have any of these:    Canker sores that don t go away after 2 weeks    Canker sores that come back more than 3 times a year    Canker sores that are larger than about a half-inch across    Fever of 100.4 F (38 C) or higher, or as directed    Pain that gets worse    You aren t able to eat or drink because of painful sores    Symptoms that don t get better, or symptoms that get worse    New symptoms   Date Last Reviewed: 5/1/2016 2000-2017 [employer]. [address] [place] [address]. All rights reserved. This information is not intended as a substitute for professional medical care. Always follow your healthcare professional's instructions.          24 Hour Appointment Hotline       To make an appointment at any [facility], call [phone] ([phone]). If you don't have a family doctor or clinic, we will help you find one. [facility] clinics are conveniently located to serve the needs of you and your family.             Review of your medicines      Notice     You have not been prescribed any medications.            Procedures and tests performed during your visit     HSV 1 and 2 DNA by PCR      Orders Needing Specimen Collection     None      Pending Results     No orders found  from 7/15/2017 to 7/18/2017.            Pending Culture Results     No orders found from 7/15/2017 to 7/18/2017.            Pending Results Instructions     If you had any lab results that were not finalized at the time of your Discharge, you can call the ED Lab Result RN at [phone]. You will be contacted by this team for any positive Lab results or changes in treatment. The nurses are available 7 days a week from 10A to 6:30P.  You can leave a message 24 hours per day and they will return your call.        Test Results From Your Hospital Stay               Clinical Quality Measure: Blood Pressure Screening     Your blood pressure was checked while you were in the emergency department today. The last reading we obtained was  BP: 134/86 . Please read the guidelines below about what these numbers mean and what you should do about them.  If your systolic blood pressure (the top number) is less than 120 and your diastolic blood pressure (the bottom number) is less than 80, then your blood pressure is normal. There is nothing more that you need to do about it.  If your systolic blood pressure (the top number) is 120-139 or your diastolic blood pressure (the bottom number) is 80-89, your blood pressure may be higher than it should be. You should have your blood pressure rechecked within a year by a primary care provider.  If your systolic blood pressure (the top number) is 140 or greater or your diastolic blood pressure (the bottom number) is 90 or greater, you may have high blood pressure. High blood pressure is treatable, but if left untreated over time it can put you at risk for heart attack, stroke, or kidney failure. You should have your blood pressure rechecked by a primary care provider within the next 4 weeks.  If your provider in the emergency department today gave you specific instructions to follow-up with your doctor or provider even sooner than that, you should follow that instruction and not wait for up  "to 4 weeks for your follow-up visit.        Thank you for choosing [facility]       Thank you for choosing [facility] for your care. Our goal is always to provide you with excellent care. Hearing back from our patients is one way we can continue to improve our services. Please take a few minutes to complete the written survey that you may receive in the mail after you visit with us. Thank you!        [employer] Information     [employer] lets you send messages to your doctor, view your test results, renew your prescriptions, schedule appointments and more. To sign up, go to [email or web address] . Click on \"Log in\" on the left side of the screen, which will take you to the Welcome page. Then click on \"Sign up Now\" on the right side of the page.     You will be asked to enter the access code listed below, as well as some personal information. Please follow the directions to create your username and password.     Your access code is: [email or web address]  Expires: 2017 11:19 AM     Your access code will  in 90 days. If you need help or a new code, please call your [facility] clinic or [phone].        Care EveryWhere ID     This is your Care EveryWhere ID. This could be used by other organizations to access your [facility] medical records  [id number]        Equal Access to Services     NEAL GARCIA : Reynaldo Kirk, waaxda luqadaha, qaybta kaalmada adejacqui, bear salgado. So [facility] [phone].    ATENCIÓN: Si habla español, tiene a dutta disposición servicios gratuitos de asistencia lingüística. Llame al [phone].    We comply with applicable federal civil rights laws and [place] laws. We do not discriminate on the basis of race, color, national origin, age, disability sex, sexual orientation or gender identity.            After Visit Summary       This is your record. Keep this with you and show to your community pharmacist(s) and doctor(s) at your next visit.             "

## 2017-07-17 NOTE — ED AVS SNAPSHOT
[facility] Emergency Department    [address]    [facility] [address]    Phone:  [phone]    Fax:  [phone]                                       Uri Valladares   MRN: [id number]    Department:  [facility] Emergency Department   Date of Visit:  7/17/2017           After Visit Summary Signature Page     I have received my discharge instructions, and my questions have been answered. I have discussed any challenges I see with this plan with the nurse or doctor.    ..........................................................................................................................................  Patient/Patient Representative Signature      ..........................................................................................................................................  Patient Representative Print Name and Relationship to Patient    ..................................................               ................................................  Date                                            Time    ..........................................................................................................................................  Reviewed by Signature/Title    ...................................................              ..............................................  Date                                                            Time

## 2017-07-18 LAB
HSV1 DNA SPEC QL NAA+PROBE: NEGATIVE
HSV2 DNA SPEC QL NAA+PROBE: NORMAL
SPECIMEN SOURCE: NORMAL

## 2017-07-18 NOTE — DISCHARGE INSTRUCTIONS
Canker Sore    A canker sore (also called an aphthous ulcer) is a painful sore on the lining of the mouth. It is most painful during the first few days, and it lasts about 7 to 14 days before going away.  Causes  Canker sores are not cold sores or fever blisters. They are not contagious, so they are not spread by contact. The exact cause of canker sores is not clear, but there are a number of things that can trigger them in different people.    Mild injury, such as biting the inside of the mouth, lip, or cheek, or dental procedures    Stress    Poor diet, or lack of certain nutrients, including B vitamins and iron    Foods that can irritate the mouth, including tomatoes, citrus fruits, and some nuts (foods that are acidic or contain bitter substances called tannins)    Irritating chemicals, such as those in some toothpastes and mouthwashes    Certain chronic illnesses  Symptoms  Canker sores are found on the lining of the mouth. They can be inside the cheeks or lips, on the roof of the mouth, at the base of the gums, on the tongue, or in the back of the throat. Canker sores typically have these characteristics:    Small, flat (not raised) sores    Can be white or yellowish bumps that are red around the edges or have a red halo    Usually small in size, roundish, and in groups    Accompanied by pain or burning  Canker sores do not leave a scar. But they usually come back.  Home care  The goals of canker sore treatment are to decrease the pain, speed healing, and prevent recurrence. No single treatment works for everyone. Try a number of techniques to see what works best.  General care    You may find that soft, easy-to-chew foods cause less pain. Use a straw to direct liquids away from the sore.    Use a soft-bristle toothbrush, and brush your teeth gently.    Avoid acidic, salty, or spicy foods.    Avoid injuring the inside of your mouth, or scraping your existing canker sores, by avoiding crusty and crunchy foods  like french bread and chips.  Medicines  You can try over-the-counter medicines that cover the sores and numb them. This protects the sores while they heal and helps reduce pain.  Homemade rinses and solutions  You can use these solutions as mouth rinses. Spit them out after using them. You can also dab them on the sores. You can repeat these treatments as often as needed.    Rinse your mouth with saltwater.    Mix equal amounts of hydrogen peroxide and water. You can use it as a mouthwash or dab it on spots with a cotton swab. You can also add sodium bicarbonate to this to make a paste, and then dab it on spots.  Follow-up care  Follow up with your healthcare provider, or as advised.    If a culture was done, you will be notified if the treatment needs to be changed. You can call as directed for the results.  Call 911  Contact emergency services if any of these occur:    Trouble breathing    Inability to swallow    Extreme drowsiness or trouble awakening    Fainting or loss of consciousness    Rapid heart rate    Seizure    Stiff neck  When to seek medical advice  Call your healthcare provider right away if any of these occur:    You have a fever of 100.4 F (38 C) or higher.    You are pregnant.    You just had surgery or another medical procedure, or you were just discharged from the hospital.    You are unable to eat or swallow due to pain.  Date Last Reviewed: 7/30/2015 2000-2017 [employer]. [address] [place] [id number]. All rights reserved. This information is not intended as a substitute for professional medical care. Always follow your healthcare professional's instructions.          Understanding Canker Sores  Canker sores are small, painful sores inside the mouth. They occur most often on the tongue, gums, or insides of the cheeks. The medical term for canker sores is aphthous ulcers.  What causes a canker sore?  The exact cause of canker sores is not known, but they are  linked to a number of conditions. These include:    An injury or irritation in the mouth, such as biting the inside of your cheek or braces rubbing    Allergy or sensitivity to certain foods or substances, such as citrus juice or some kinds of toothpaste    Poor nutrition    Emotional stress    Certain infections and illnesses  Canker sores tend to run in families.  What are the symptoms of a canker sore?  These are some common traits of canker sores:    Sores are open and grayish-yellow, surrounded by redness.    Sores are usually painful and sensitive to touch.    Canker sores may be preceded by a burning or tingling sensation a few hours to a few days before the sore appears.    Children and teens tend to get canker sores more often than adults.  How are canker sores treated?  Canker sores usually go away by themselves within 10 to 14 days. There is no cure for canker sores. Treatment focuses on relieving symptoms and shortening outbreaks. Treatments may include:    Prescription or over-the-counter skin treatments to apply to the sores. Steroids for your skin (topical) may protect the canker sores from further irritation and allow them to heal. Topical pain relief medicines may numb the area and make the sores less painful.    Certain types of toothpaste. These do not contain sodium lauryl sulfate. This type of toothpaste may prevent further aggravation of canker sores.    Oral prescription medicines. These are used for severe cases to help relieve symptoms.    Prescription or over-the-counter pain medicines. These help with discomfort.  What are the complications of a canker sore?  Mouth sores that seem to be canker sores can be signs of a more serious illness. If you have other signs of illness along with mouth sores, you should talk with a healthcare provider. Canker sores can be so painful that they interfere with talking, eating, or drinking.  When should I call my healthcare provider?  Call your healthcare  provider right away if you have any of these:    Canker sores that don t go away after 2 weeks    Canker sores that come back more than 3 times a year    Canker sores that are larger than about a half-inch across    Fever of 100.4 F (38 C) or higher, or as directed    Pain that gets worse    You aren t able to eat or drink because of painful sores    Symptoms that don t get better, or symptoms that get worse    New symptoms   Date Last Reviewed: 5/1/2016 2000-2017 [employer]. [address] [place] [address], [place] [address]. All rights reserved. This information is not intended as a substitute for professional medical care. Always follow your healthcare professional's instructions.

## 2017-07-18 NOTE — ED NOTES
Patient presents to ED due canker sores in mouth. Reports sores developed approx a week ago , now making it difficulty eating or drinking. Requesting prescription for magic mouth wash

## 2017-07-18 NOTE — ED PROVIDER NOTES
"  History     Chief Complaint:  Mouth Lesions      HPI   Uri Valladares is a 26 year old male who presents with mouth lesions. Patient reports developing several canker sores a couple weeks prior which have persisted and are painful to the point he is having difficulty eating or drinking prompting visit to the emergency department. He has a history of complex canker sores for the last couple years which was evaluated previously. Patient is requesting magic mouth wash. He denies known sick contacts, other rash, or other complaint.     Allergies:  No known drug allergies      Medications:    The patient is not currently taking any prescribed medications.     Past Medical History:    Depression  Suicidal ideation    Past Surgical History:    History reviewed. No pertinent surgical history.     Family History:    History reviewed. No pertinent family history.      Social History:  Presents with female    Tobacco use: 0.50 PPD  Alcohol use: 4 times weekly  PCP: [facility]    Marital Status:  Single    Review of Systems   HENT: Positive for mouth sores.    Skin: Negative for rash.   All other systems reviewed and are negative.    Physical Exam     Patient Vitals for the past 24 hrs:   BP Temp Temp src Heart Rate Resp SpO2 Height Weight   07/17/17 2224 134/86 - - - - - - -   07/17/17 2221 - 97.4  F (36.3  C) Temporal 85 16 100 % 1.88 m (6' 2\") 75.8 kg (167 lb)       Physical Exam  General: Patient is alert and interactive when I enter the room  Head:  The scalp, face, and head appear normal  Eyes:  The pupils are equal, round, and reactive to light    Conjunctivae and sclerae are normal  ENT:    External acoustic canals are normal    Large aphthous ulcers involving mucosal surfaces of mouth including posterior pharynx, upper and lower gingiva, and tongue. There is no abscesses or anterior cervical lymphadenopathy.      Uvula is in the midline  Neck:  Normal range of motion  CV:  Regular rate. S1/S2. " No murmurs.   Resp:  Lungs are clear without wheezes or rales. No distress  GI:  Abdomen is soft, no rigidity, guarding, or rebound    No distension. No tenderness to palpation in any quadrant.     MS:  Normal tone. Joints grossly normal without effusions.     No asymmetric leg swelling, calf or thigh tenderness.      Normal motor assessment of all extremities.  Skin:  No rash or lesions noted. Normal capillary refill noted  Neuro: Speech is normal and fluent. Face is symmetric.     Moving all extremities well.   Psych:  Awake. Alert.  Normal affect.  Appropriate interactions.  Lymph: No anterior cervical lymphadenopathy noted    Emergency Department Course   Emergency Department Course:  Past medical records, nursing notes, and vitals reviewed.  2233: I performed an exam of the patient and obtained history, as documented above.   HSV PCR sent.  2250: I rechecked the patient.  Findings and plan explained to the Patient. Patient discharged home with instructions regarding supportive care, medications, and reasons to return. The importance of close follow-up was reviewed.        Laboratory:  HSV PCR: pending    Impression & Plan    Medical Decision Making:  Uri Valladares is a 26 year old male who presents for evaluation of mouth lesions.  On clinical exam there are findings consistent with apthous ulcers, viral like enterovirus/coxsackie vs HSV vs cancer sores. Will see primary for HIV and await HSV. Doubt fungus and previous wet prep negative. No rash or lesions on palms or soles noted.   Magic mouthwash initiated w/ refill.           Diagnosis:    ICD-10-CM    1. Ulcer aphthous oral K12.0        Disposition:  Discharged to home with plan as outlined.    Discharge Medications:  New Prescriptions    DIPHENHYD-HC-NYSTATIN-TETRACYC (FIRST-MIKE MOUTHWASH) SUSP    Swish and gargle then spit out 5ml by mouth every 4 hours as needed for pain.         Forrest Stiles  7/17/2017   [facility] EMERGENCY  DEPARTMENT  I, Forrestvivek Stiles, am serving as a scribe at 10:33 PM on 7/17/2017 to document services personally performed by Jessee South MD based on my observations and the provider's statements to me.       Jessee South MD  07/18/17 0037

## 2018-07-07 ENCOUNTER — HOSPITAL ENCOUNTER (INPATIENT)
Facility: CLINIC | Age: 28
LOS: 3 days | Discharge: PSYCHIATRIC OR SUBSTANCE USE FACILITY | DRG: 897 | End: 2018-07-10
Attending: PSYCHIATRY & NEUROLOGY | Admitting: PSYCHIATRY & NEUROLOGY
Payer: COMMERCIAL

## 2018-07-07 DIAGNOSIS — F39 MOOD DISORDER (H): ICD-10-CM

## 2018-07-07 DIAGNOSIS — F11.10 OPIOID ABUSE (H): Primary | ICD-10-CM

## 2018-07-07 DIAGNOSIS — F11.20 CONTINUOUS OPIOID DEPENDENCE (H): ICD-10-CM

## 2018-07-07 DIAGNOSIS — F11.93 OPIOID WITHDRAWAL (H): ICD-10-CM

## 2018-07-07 LAB
AMPHETAMINES UR QL SCN: NEGATIVE
BARBITURATES UR QL: NEGATIVE
BENZODIAZ UR QL: NEGATIVE
CANNABINOIDS UR QL SCN: POSITIVE
COCAINE UR QL: NEGATIVE
ETHANOL UR QL SCN: NEGATIVE
OPIATES UR QL SCN: NEGATIVE

## 2018-07-07 PROCEDURE — 80320 DRUG SCREEN QUANTALCOHOLS: CPT | Performed by: PSYCHIATRY & NEUROLOGY

## 2018-07-07 PROCEDURE — 80307 DRUG TEST PRSMV CHEM ANLYZR: CPT | Performed by: PSYCHIATRY & NEUROLOGY

## 2018-07-07 PROCEDURE — 99285 EMERGENCY DEPT VISIT HI MDM: CPT | Mod: 25 | Performed by: PSYCHIATRY & NEUROLOGY

## 2018-07-07 PROCEDURE — 99284 EMERGENCY DEPT VISIT MOD MDM: CPT | Mod: Z6 | Performed by: PSYCHIATRY & NEUROLOGY

## 2018-07-07 PROCEDURE — 12800012 ZZH R&B CD MH INTERMEDIATE ADULT

## 2018-07-07 RX ORDER — ACETAMINOPHEN 325 MG/1
650 TABLET ORAL EVERY 4 HOURS PRN
Status: DISCONTINUED | OUTPATIENT
Start: 2018-07-07 | End: 2018-07-10 | Stop reason: HOSPADM

## 2018-07-07 RX ORDER — HYDROXYZINE HYDROCHLORIDE 25 MG/1
25-50 TABLET, FILM COATED ORAL EVERY 4 HOURS PRN
Status: DISCONTINUED | OUTPATIENT
Start: 2018-07-07 | End: 2018-07-10 | Stop reason: HOSPADM

## 2018-07-07 RX ORDER — IBUPROFEN 600 MG/1
600 TABLET, FILM COATED ORAL EVERY 6 HOURS PRN
Status: DISCONTINUED | OUTPATIENT
Start: 2018-07-07 | End: 2018-07-10 | Stop reason: HOSPADM

## 2018-07-07 RX ORDER — TRAZODONE HYDROCHLORIDE 50 MG/1
50 TABLET, FILM COATED ORAL
Status: DISCONTINUED | OUTPATIENT
Start: 2018-07-07 | End: 2018-07-10 | Stop reason: HOSPADM

## 2018-07-07 RX ORDER — BISACODYL 10 MG
10 SUPPOSITORY, RECTAL RECTAL DAILY PRN
Status: DISCONTINUED | OUTPATIENT
Start: 2018-07-07 | End: 2018-07-10 | Stop reason: HOSPADM

## 2018-07-07 RX ORDER — BUPRENORPHINE 2 MG/1
4 TABLET SUBLINGUAL 2 TIMES DAILY
Status: DISCONTINUED | OUTPATIENT
Start: 2018-07-08 | End: 2018-07-10 | Stop reason: HOSPADM

## 2018-07-07 RX ORDER — ALUMINA, MAGNESIA, AND SIMETHICONE 2400; 2400; 240 MG/30ML; MG/30ML; MG/30ML
30 SUSPENSION ORAL EVERY 4 HOURS PRN
Status: DISCONTINUED | OUTPATIENT
Start: 2018-07-07 | End: 2018-07-10 | Stop reason: HOSPADM

## 2018-07-07 RX ORDER — NICOTINE 21 MG/24HR
1 PATCH, TRANSDERMAL 24 HOURS TRANSDERMAL DAILY
Status: DISCONTINUED | OUTPATIENT
Start: 2018-07-08 | End: 2018-07-10 | Stop reason: HOSPADM

## 2018-07-07 RX ORDER — LOPERAMIDE HCL 2 MG
2 CAPSULE ORAL 4 TIMES DAILY PRN
Status: DISCONTINUED | OUTPATIENT
Start: 2018-07-07 | End: 2018-07-10 | Stop reason: HOSPADM

## 2018-07-07 RX ORDER — BUPRENORPHINE 2 MG/1
4 TABLET SUBLINGUAL ONCE
Status: COMPLETED | OUTPATIENT
Start: 2018-07-08 | End: 2018-07-08

## 2018-07-07 RX ORDER — CLONIDINE HYDROCHLORIDE 0.1 MG/1
.1-.2 TABLET ORAL 3 TIMES DAILY PRN
Status: DISCONTINUED | OUTPATIENT
Start: 2018-07-07 | End: 2018-07-10 | Stop reason: HOSPADM

## 2018-07-07 RX ORDER — ONDANSETRON 4 MG/1
4 TABLET, ORALLY DISINTEGRATING ORAL EVERY 6 HOURS PRN
Status: DISCONTINUED | OUTPATIENT
Start: 2018-07-07 | End: 2018-07-10 | Stop reason: HOSPADM

## 2018-07-07 ASSESSMENT — ENCOUNTER SYMPTOMS
HALLUCINATIONS: 0
SHORTNESS OF BREATH: 0
ABDOMINAL PAIN: 0
FEVER: 0
DYSPHORIC MOOD: 1
NERVOUS/ANXIOUS: 0

## 2018-07-07 ASSESSMENT — ACTIVITIES OF DAILY LIVING (ADL)
GROOMING: INDEPENDENT
DRESS: INDEPENDENT
ORAL_HYGIENE: INDEPENDENT

## 2018-07-07 NOTE — ED PROVIDER NOTES
History     Chief Complaint   Patient presents with     Suicidal     anxious about fentanyl (Oral use only)abuse leading to SI, and attempt to OD last emi, states he feelasleep had but had no other effects     The history is provided by the patient and medical records.     Uri Valaldares is a 27 year old male who comes in due to having used fentanyl for the last 6 months. He states he takes it orally and has been using  mg a day. His last use was around 0300 today. He states he has some mild hot/cold sweats. He denies other drug use but has a history of alcohol and marijuana use. He states he has been depressed for a long time. He states he had suicidal thoughts last night and used all the fentanyl he had but still woke up.  He denies any suicidalt houghts now. He states that they are off and on. He lives in [place] but has been in [place] for the last month due to some court dates he needed to be here for (DWI 2/2018).  He states he wants to go to CD treatment in [place]. He has an intake for [facility] on 7/9/18.      I have reviewed the Medications, Allergies, Past Medical and Surgical History, and Social History in the Epic system.    Review of Systems   Constitutional: Negative for fever.   Respiratory: Negative for shortness of breath.    Cardiovascular: Negative for chest pain.   Gastrointestinal: Negative for abdominal pain.   Psychiatric/Behavioral: Positive for dysphoric mood. Negative for hallucinations, self-injury and suicidal ideas (had some last night). The patient is not nervous/anxious.    All other systems reviewed and are negative.      Physical Exam   BP: 126/79  Pulse: 100  Temp: 96.7  F (35.9  C)  Resp: 14  SpO2: 98 %      Physical Exam   Constitutional: He is oriented to person, place, and time. He appears well-developed and well-nourished.   HENT:   Head: Normocephalic and atraumatic.   Mouth/Throat: Oropharynx is clear and moist. No oropharyngeal exudate.   Eyes: Pupils are equal, round, and  reactive to light.   Neck: Normal range of motion. Neck supple.   Cardiovascular: Normal rate, regular rhythm and normal heart sounds.    Pulmonary/Chest: Effort normal and breath sounds normal. No respiratory distress.   Abdominal: Soft. Bowel sounds are normal. There is no tenderness.   Musculoskeletal: Normal range of motion.   Neurological: He is alert and oriented to person, place, and time.   Skin: Skin is warm. No rash noted.   Psychiatric: His speech is normal and behavior is normal. Thought content normal. He is not actively hallucinating. Thought content is not paranoid and not delusional. Cognition and memory are normal. He expresses inappropriate judgment. He exhibits a depressed mood. He expresses no homicidal and no suicidal ideation. He expresses no suicidal plans and no homicidal plans.   Uri is a 28 y/o male who looks his age. He is well groomed with good eye contact.   Nursing note and vitals reviewed.      ED Course     ED Course     Procedures               Labs Ordered and Resulted from Time of ED Arrival Up to the Time of Departure from the ED - No data to display         Assessments & Plan (with Medical Decision Making)   Uri will be admitted to station 3a for detox under Dr. Shultz.    I have reviewed the nursing notes.    I have reviewed the findings, diagnosis, plan and need for follow up with the patient.    New Prescriptions    No medications on file       Final diagnoses:   Continuous opioid dependence (H)   Mood disorder (H)       7/7/2018   [facility], [place], EMERGENCY DEPARTMENT     Marty Anders MD  07/07/18 1703

## 2018-07-07 NOTE — IP AVS SNAPSHOT
[facility]    [address] [place] [address]    Phone:  [phone]                                       After Visit Summary   7/7/2018    Uri Valladares    MRN: [id number]           After Visit Summary Signature Page     I have received my discharge instructions, and my questions have been answered. I have discussed any challenges I see with this plan with the nurse or doctor.    ..........................................................................................................................................  Patient/Patient Representative Signature      ..........................................................................................................................................  Patient Representative Print Name and Relationship to Patient    ..................................................               ................................................  Date                                            Time    ..........................................................................................................................................  Reviewed by Signature/Title    ...................................................              ..............................................  Date                                                            Time

## 2018-07-07 NOTE — ED PROVIDER NOTES
I have performed an in person assessment of the patient. Based on this assessment the patient no longer requires a one on one attendant at this point in time.    Cailin Colmenares MD  2:56 PM  July 7, 2018           Cailin Colmenares MD  07/07/18 1450

## 2018-07-07 NOTE — ED NOTES
ED to Behavioral Floor Handoff    SITUATION  Uri Valladares is a 27 year old male who speaks English and lives in a home with others The patient arrived in the ED by public transportation from home with a complaint of Suicidal (anxious about fentanyl (Oral use only)abuse leading to SI, and attempt to OD last emi, states he feelasleep had but had no other effects)  .The patient's current symptoms started/worsened 6 month(s) ago and during this time the symptoms have increased. Using 90-100mg fentanyl per day with last use at approx 0300  In the ED, pt was diagnosed with   Final diagnoses:   Continuous opioid dependence (H)   Mood disorder (H)        Initial vitals were: BP: 126/79  Pulse: 100  Temp: 96.7  F (35.9  C)  Resp: 14  SpO2: 98 %   --------  Is the patient diabetic? No   If yes, last blood glucose? --     If yes, was this treated in the ED? --  --------  Is the patient inebriated (ETOH) No or Impaired on other substances? No  MSSA done? N/A  Last MSSA score: --    Were withdrawal symptoms treated? N/A  Does the patient have a seizure history? No. If yes, date of most recent seizure--  --------  Is the patient patient experiencing suicidal ideation? reports occasional suicidal thoughts representing feeling that life is not worth feeling, pt took all the fentanyl he had left last NOC in hopes of not waking up.   Homicidal ideation? denies current or recent homicidal ideation or behaviors.    Self-injurious behavior/urges? denies current or recent self injurious behavior or ideation.  ------  Was pt aggressive in the ED No  Was a code called No  Is the pt now cooperative? Yes  -------  Meds given in ED: Medications - No data to display   Family present during ED course? No  Family currently present? No    BACKGROUND  Does the patient have a cognitive impairment or developmental disability? No  Allergies: No Known Allergies.   Social demographics are   Social History     Social History     Marital status: Single      Spouse name: N/A     Number of children: N/A     Years of education: N/A     Social History Main Topics     Smoking status: Light Tobacco Smoker     Packs/day: 0.50     Smokeless tobacco: Not on file     Alcohol use Yes      Comment: 4 times per week     Drug use: No     Sexual activity: Not on file     Other Topics Concern     Not on file     Social History Narrative        ASSESSMENT  Labs results   Labs Ordered and Resulted from Time of ED Arrival Up to the Time of Departure from the ED   DRUG ABUSE SCREEN 6 CHEM DEP URINE ([facility])      Imaging Studies: No results found for this or any previous visit (from the past 24 hour(s)).   Most recent vital signs /79  Pulse 100  Temp 96.7  F (35.9  C) (Oral)  Resp 14  SpO2 98%   Abnormal labs/tests/findings requiring intervention:---   Pain control: good  Nausea control: pt had none    RECOMMENDATION  Are any infection precautions needed (MRSA, VRE, etc.)? No If yes, what infection? --  ---  Does the patient have mobility issues? independently. If yes, what device does the pt use? ---  ---  Is patient on 72 hour hold or commitment? No If on 72 hour hold, have hold and rights been given to patient? No  Are admitting orders written if after 10 p.m. ?N/A  Tasks needing to be completed:---     DELL ROMERO   [phone]-- [phone] [place] [facility]   [phone] [facility]

## 2018-07-07 NOTE — IP AVS SNAPSHOT
MRN:[id number]                      After Visit Summary   7/7/2018    Uri Valladares    MRN: [id number]           Thank you!     Thank you for choosing [facility] for your care. Our goal is always to provide you with excellent care.        Patient Information     Date Of Birth          1990        Designated Caregiver       Most Recent Value    Caregiver    Will someone help with your care after discharge? no      About your hospital stay     You were admitted on:  July 7, 2018 You last received care in the:  [facility]    You were discharged on:  July 10, 2018       Who to Call     For medical emergencies, please call 911.  For non-urgent questions about your medical care, please call your primary care provider or clinic, None          Attending Provider     Provider Specialty    Marty Anders MD Emergency Medicine    Critical access hospital, Dariela Hyde MD Psychiatry       Primary Care Provider Fax #    Physician No Ref-Primary [phone]      Your next 10 appointments already scheduled     Jul 10, 2018  2:00 PM CDT   Treatment with LP/DOP ADMISSIONS   [facility] ([facility])    [address] [id number] [phone]            Jul 18, 2018  1:20 PM CDT   New Visit with Sera Bardales MD   [facility] ([facility])    [address] [place] [address]  [address]  [facility] [id number] [phone]              Further instructions from your care team       Behavioral [facility] Discharge Planning and Instructions  THANK YOU FOR CHOOSING THE [facility]  [facility]: [phone]    Summary: You were admitted to and processed through [facility] on July 7, 2018 for detoxification from opioids.  A medical examination was performed that included lab work. You have met with a  and opted to transfer directly to the  [facility].  Please make your recovery a priority, Mr. Valladares.     Main Diagnosis:  Opioid Dependence    Major Treatments, Procedures and Findings:  You were detoxified from opioidsusing the appropriate protocols. You have had a chemical dependency assessment.  You have had blood drawn, and the results have been reviewed with you.  Please take a copy of your laboratory work with you to your next provider appointment.    Symptoms to Report:  If you experience more anxiety, confusion, sleeplessness, deep sadness or thoughts of suicide, notify your treatment team or notify your primary care physician. IF THE SYMPTOMS YOU ARE EXPERIENCING ARE A MEDICAL EMERGENCY, CALL 911 IMMEDIATELY.     Lifestyle Adjustment: Adjust your lifestyle to get enough sleep, relaxation, exercise and excellent nutrition. Continue to develop healthy coping skills to decrease stress and promote a sober living environment. Do not use alcohol, illegal drugs or addictive medications other than what is currently prescribed. AA, ERICK, and a sponsor are excellent resources for support.     Primary Provider:  Patient states that he will follow up as needed for future medical needs at the [facility], once secured in [facility].    Resources:  [facility] [phone] Support Group:  AA/ERICK and Sponsor/support.  Crisis Intervention: 678.689.6170 or 753-909-4511 (TTY: 648.648.1061). Call anytime for help.  National [place] on Mental Illness (www.mn.aga.org): 970.853.5198 or 191-938-2693.  Alcoholics Anonymous (www.alcoholics-anonymous.org): Check your phone book for your local chapter.  Suicide Awareness Voices of Education (www.save.org): 695-327-JVSR (0331)  National Suicide Prevention Line (www.mentalhealthmn.org): 445-235-AHJT (0475)  Mental Health Consumer/Survivor Network of [place] (www.mhcsn.net): 831.813.7280 or 227-148-1966.  Mental Health Association of [place] (www.mentalhealth.org): 255.925.5481 or 406-111-9132   "Substance Abuse and Mental Health Services. (www.samhsa.gov)    [facility] ([facility])  [facility] connects people seeking recovery to resources that help foster and sustain long-term recovery.  Whether you are seeking resources for treatment, transportation, housing, job training, education, health care or other pathways to recovery, [facility] is a great place to start. 169.406.8481 www.[facility].org    General Medication Instruction: See your medication papers for instructions. Take all medicines as directed.  Make no changes unless your doctor suggests them. Go to all your doctor visits.  Be sure to have all your required lab tests. This way, your medicines may be refilled on time. Do not use any drugs not prescribed by your provider. AA/NA and sponsors are excellent resources for support. Avoid alcohol at all costs!    Please Note:  If you have any questions at anytime after you are discharged please call the [facility] [place] detoxification y[facility] at [phone].  [facility], Behavioral Intake [phone]. Please take this discharge folder with you to all your follow up appointments, it contains your lab results, diagnosis, medication list and discharge recommendations.    THANK YOU FOR CHOOSING THE [facility]      Pending Results     No orders found from 7/5/2018 to 7/8/2018.            Admission Information     Date & Time Provider Department Dept. Phone    7/7/2018 Dariela Shultz MD [facility] [phone]      Your Vitals Were     Blood Pressure Pulse Temperature Respirations Height Weight    129/71 87 97.6  F (36.4  C) 16 1.88 m (6' 2\") 74.4 kg (164 lb)    Pulse Oximetry BMI (Body Mass Index)                100% 21.06 kg/m2          MyChart Information     [employer] lets you send messages to your doctor, view your test results, renew your prescriptions, schedule appointments and more. To sign " "up, go to [email or web address] . Click on \"Log in\" on the left side of the screen, which will take you to the Welcome page. Then click on \"Sign up Now\" on the right side of the page.     You will be asked to enter the access code listed below, as well as some personal information. Please follow the directions to create your username and password.     Your access code is: [email or web address]  Expires: 10/8/2018  9:51 AM     Your access code will  in 90 days. If you need help or a new code, please call your [facility] clinic or [phone].        Care EveryWhere ID     This is your Care EveryWhere ID. This could be used by other organizations to access your [facility] medical records  [id number]        Equal Access to Services     NEAL GARCIA : Reynaldo Kirk, maricel york, jt anguiano, bear salgado. [facility] [phone].    ATENCIÓN: Si habla español, tiene a dutta disposición servicios gratuitos de asistencia lingüística. [place] al [phone].    We comply with applicable federal civil rights laws and [place] laws. We do not discriminate on the basis of race, color, national origin, age, disability, sex, sexual orientation, or gender identity.               Review of your medicines      START taking        Dose / Directions    buprenorphine HCl-naloxone HCl 4-1 MG per film   Commonly known as:  SUBOXONE   Used for:  Opioid abuse, Continuous opioid dependence (H)        Dose:  1 Film   Place 1 Film under the tongue 2 times daily for 8 days   Quantity:  16 Film   Refills:  0       traZODone 50 MG tablet   Commonly known as:  DESYREL   Used for:  Mood disorder (H)        Dose:  50 mg   Take 1 tablet (50 mg) by mouth nightly as needed for sleep   Quantity:  30 tablet   Refills:  1            Where to get your medicines      These medications were sent to [facility] [place], [place] - [address]  [address], [facility] " [id number]     Phone:  [phone]     traZODone 50 MG tablet         Some of these will need a paper prescription and others can be bought over the counter. Ask your nurse if you have questions.     Bring a paper prescription for each of these medications     buprenorphine HCl-naloxone HCl 4-1 MG per film                Protect others around you: Learn how to safely use, store and throw away your medicines at www.disposemymeds.org.             Medication List: This is a list of all your medications and when to take them. Check marks below indicate your daily home schedule. Keep this list as a reference.      Medications           Morning Afternoon Evening Bedtime As Needed    buprenorphine HCl-naloxone HCl 4-1 MG per film   Commonly known as:  SUBOXONE   Place 1 Film under the tongue 2 times daily for 8 days                                traZODone 50 MG tablet   Commonly known as:  DESYREL   Take 1 tablet (50 mg) by mouth nightly as needed for sleep   Last time this was given:  50 mg on 7/9/2018 11:58 PM

## 2018-07-08 LAB
ALBUMIN SERPL-MCNC: 3.6 G/DL (ref 3.4–5)
ALP SERPL-CCNC: 67 U/L (ref 40–150)
ALT SERPL W P-5'-P-CCNC: 19 U/L (ref 0–70)
ANION GAP SERPL CALCULATED.3IONS-SCNC: 6 MMOL/L (ref 3–14)
AST SERPL W P-5'-P-CCNC: 16 U/L (ref 0–45)
BASOPHILS # BLD AUTO: 0 10E9/L (ref 0–0.2)
BASOPHILS NFR BLD AUTO: 0.4 %
BILIRUB SERPL-MCNC: 0.7 MG/DL (ref 0.2–1.3)
BUN SERPL-MCNC: 11 MG/DL (ref 7–30)
CALCIUM SERPL-MCNC: 8.9 MG/DL (ref 8.5–10.1)
CHLORIDE SERPL-SCNC: 109 MMOL/L (ref 94–109)
CO2 SERPL-SCNC: 28 MMOL/L (ref 20–32)
CREAT SERPL-MCNC: 0.93 MG/DL (ref 0.66–1.25)
DIFFERENTIAL METHOD BLD: ABNORMAL
EOSINOPHIL # BLD AUTO: 0.3 10E9/L (ref 0–0.7)
EOSINOPHIL NFR BLD AUTO: 5.2 %
ERYTHROCYTE [DISTWIDTH] IN BLOOD BY AUTOMATED COUNT: 13.7 % (ref 10–15)
GFR SERPL CREATININE-BSD FRML MDRD: >90 ML/MIN/1.7M2
GLUCOSE SERPL-MCNC: 117 MG/DL (ref 70–99)
HCT VFR BLD AUTO: 39.8 % (ref 40–53)
HGB BLD-MCNC: 12.8 G/DL (ref 13.3–17.7)
IMM GRANULOCYTES # BLD: 0 10E9/L (ref 0–0.4)
IMM GRANULOCYTES NFR BLD: 0 %
LYMPHOCYTES # BLD AUTO: 2.1 10E9/L (ref 0.8–5.3)
LYMPHOCYTES NFR BLD AUTO: 39.3 %
MCH RBC QN AUTO: 28.5 PG (ref 26.5–33)
MCHC RBC AUTO-ENTMCNC: 32.2 G/DL (ref 31.5–36.5)
MCV RBC AUTO: 89 FL (ref 78–100)
MONOCYTES # BLD AUTO: 0.2 10E9/L (ref 0–1.3)
MONOCYTES NFR BLD AUTO: 4.4 %
NEUTROPHILS # BLD AUTO: 2.7 10E9/L (ref 1.6–8.3)
NEUTROPHILS NFR BLD AUTO: 50.7 %
NRBC # BLD AUTO: 0 10*3/UL
NRBC BLD AUTO-RTO: 0 /100
PLATELET # BLD AUTO: 342 10E9/L (ref 150–450)
POTASSIUM SERPL-SCNC: 4.1 MMOL/L (ref 3.4–5.3)
PROT SERPL-MCNC: 7.1 G/DL (ref 6.8–8.8)
RBC # BLD AUTO: 4.49 10E12/L (ref 4.4–5.9)
SODIUM SERPL-SCNC: 143 MMOL/L (ref 133–144)
WBC # BLD AUTO: 5.2 10E9/L (ref 4–11)

## 2018-07-08 PROCEDURE — 12800012 ZZH R&B CD MH INTERMEDIATE ADULT

## 2018-07-08 PROCEDURE — 25000132 ZZH RX MED GY IP 250 OP 250 PS 637: Performed by: PSYCHIATRY & NEUROLOGY

## 2018-07-08 PROCEDURE — 99232 SBSQ HOSP IP/OBS MODERATE 35: CPT | Performed by: PHYSICIAN ASSISTANT

## 2018-07-08 PROCEDURE — 85025 COMPLETE CBC W/AUTO DIFF WBC: CPT | Performed by: PSYCHIATRY & NEUROLOGY

## 2018-07-08 PROCEDURE — HZ2ZZZZ DETOXIFICATION SERVICES FOR SUBSTANCE ABUSE TREATMENT: ICD-10-PCS | Performed by: PSYCHIATRY & NEUROLOGY

## 2018-07-08 PROCEDURE — 80053 COMPREHEN METABOLIC PANEL: CPT | Performed by: PSYCHIATRY & NEUROLOGY

## 2018-07-08 PROCEDURE — 99221 1ST HOSP IP/OBS SF/LOW 40: CPT | Mod: AI | Performed by: PSYCHIATRY & NEUROLOGY

## 2018-07-08 PROCEDURE — 36415 COLL VENOUS BLD VENIPUNCTURE: CPT | Performed by: PSYCHIATRY & NEUROLOGY

## 2018-07-08 RX ADMIN — BUPRENORPHINE HCL 4 MG: 2 TABLET SUBLINGUAL at 20:09

## 2018-07-08 RX ADMIN — BUPRENORPHINE HCL 4 MG: 2 TABLET SUBLINGUAL at 07:03

## 2018-07-08 RX ADMIN — BUPRENORPHINE HCL 4 MG: 2 TABLET SUBLINGUAL at 02:24

## 2018-07-08 RX ADMIN — NICOTINE 1 PATCH: 21 PATCH, EXTENDED RELEASE TRANSDERMAL at 08:36

## 2018-07-08 RX ADMIN — TRAZODONE HYDROCHLORIDE 50 MG: 50 TABLET ORAL at 23:48

## 2018-07-08 ASSESSMENT — ACTIVITIES OF DAILY LIVING (ADL)
DRESS: INDEPENDENT
DRESS: INDEPENDENT
GROOMING: INDEPENDENT
LAUNDRY: WITH SUPERVISION
ORAL_HYGIENE: INDEPENDENT
LAUNDRY: WITH SUPERVISION
GROOMING: INDEPENDENT
ORAL_HYGIENE: INDEPENDENT

## 2018-07-08 NOTE — PROGRESS NOTES
07/07/18 2639   Patient Belongings   Did you bring any home meds/supplements to the hospital?  No   Patient Belongings cell phone/electronics;clothing;shoes;wallet   Disposition of Belongings Kept with patient;Locker;Sent to security per site process   Belongings Search Yes   Clothing Search Yes   Second Staff TALIA Hooks and MONICA Zhang    Locked drawer: cell phone,  and wallet (No money )   Storage bin: 2 belts, shoes with lace, sweater with string and duffle bag (hats, shorts with string, hygiene items)  Sharps: lighter and razor.  [id number]: 3 Visa cards, [employer] card, [place] Id and 3 state farm cards  A             Admission:  I am responsible for any personal items that are not sent to the safe or pharmacy.  [facility] is not responsible for loss, theft or damage of any property in my possession.  Signature:  _________________________________ Date: _______  Time: _____                                              Staff Signature:  ____________________________ Date: ________  Time: _____      2nd Staff person, if patient is unable/unwilling to sign:    Signature: ________________________________ Date: ________  Time: _____   Discharge:  [facility] has returned all of my personal belongings:  Signature: _________________________________ Date: ________  Time: _____                                          Staff Signature:  ____________________________ Date: ________  Time: _____

## 2018-07-08 NOTE — PROGRESS NOTES
In bed napping at 16:00. RN requested he have vitals taken and check in.  Indicated he would do this but did not follow through.

## 2018-07-08 NOTE — PROGRESS NOTES
The patient said that he is feeling much better and he feels that the Buprenorphine has really helped him.  He has been in the milieu and has participated in unit activities.  He states that he wants to go to IP treatment.

## 2018-07-08 NOTE — H&P
"[facility] [place]   Psychiatric History & Physical  Admission date: 7/7/2018  Uri Valladares  [id number]  [birth year]    Time: 67 minutes on encounter, >50% of which was spent in counseling and/or coordination of care consisting of: communication and education with the patient, communication with family and or friends if documented in note, lab/image/study evaluation, support staff communication, and other sources pertinent to excellent patient care.            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    According to records was using fentanyl and potentially attempted overdose prior to hospitalization and once detoxification off of the substance.  Upon meeting with him reports that he is not currently thinking about suicide but sometimes he does think about it.  Is not currently planning his death but is interested in getting off opiates and going to a treatment facility.  Denies any anhedonia or hopelessness or helplessness or thoughts of harming others any sleep dysfunction or attention or concentration issues.  Denies any previous obsessive-compulsive disorder symptoms posttraumatic stress disorder generalized anxiety paranoia or hallucinations or social anxiety or eating disorder symptoms or previous manic episodes.  Denies any gambling addiction pornography addiction sexual addiction or shopping addiction.    Is interested in going to treatment facility after detoxification off of opiates.    Physically is feeling healthy other than withdrawal symptoms.        Past Psychiatric History:     Has a history of major depressive disorder and previous overdose on opiates 2 previous inpatient hospitalizations no traumatic brain injury no electroconvulsive therapy no " seizures.  No current psychiatrist no previous medications for depression.  Denies any previous commitments or previous violence.  Previously spent time in snf for possession of oxycodone currently on probation.          Substance Use and History:     Substance use started at the age of 11 with cannabis last used yesterday, cocaine started at the age of 17 last used 1 week ago, opiate use started at age 17 last use yesterday, alcohol use started at age 12 last used a few days ago, 1 previous DUI and has never been to treatment.  He has done methamphetamine in the past but denies that is a current substance.          Past Medical History:   PAST MEDICAL HISTORY:   Past Medical History:   Diagnosis Date     Depressive disorder        PAST SURGICAL HISTORY: No past surgical history on file.          Family History:   FAMILY HISTORY:   Family History   Problem Relation Age of Onset     Substance Abuse Mother      Depression Mother            Social History:   SOCIAL HISTORY:   Social History     Social History     Marital status: Single     Spouse name: N/A     Number of children: N/A     Years of education: N/A     Social History Main Topics     Smoking status: Light Tobacco Smoker     Packs/day: 0.50     Smokeless tobacco: Not on file     Alcohol use Yes      Comment: 4 times per week     Drug use: No     Sexual activity: Not on file     Other Topics Concern     Not on file     Social History Narrative    Born in [place] spent time in [place] and also [place] has some family members here is primarily living in his car for the past 5 months no access to guns or weapons has limited contact with family and children; enjoys drawing.            Physical ROS:   The patient endorsed the above issues. The remainder of 10-point review of systems was negative except as noted in HPI.         PTA Medications:     No prescriptions prior to admission.          Allergies:   No Known Allergies       Labs:     Recent Results  (from the past 48 hour(s))   Drug abuse screen 6 urine (tox)    Collection Time: 07/07/18  4:16 PM   Result Value Ref Range    Amphetamine Qual Urine Negative NEG^Negative    Barbiturates Qual Urine Negative NEG^Negative    Benzodiazepine Qual Urine Negative NEG^Negative    Cannabinoids Qual Urine Positive (A) NEG^Negative    Cocaine Qual Urine Negative NEG^Negative    Ethanol Qual Urine Negative NEG^Negative    Opiates Qualitative Urine Negative NEG^Negative   CBC with platelets differential    Collection Time: 07/08/18  7:38 AM   Result Value Ref Range    WBC 5.2 4.0 - 11.0 10e9/L    RBC Count 4.49 4.4 - 5.9 10e12/L    Hemoglobin 12.8 (L) 13.3 - 17.7 g/dL    Hematocrit 39.8 (L) 40.0 - 53.0 %    MCV 89 78 - 100 fl    MCH 28.5 26.5 - 33.0 pg    MCHC 32.2 31.5 - 36.5 g/dL    RDW 13.7 10.0 - 15.0 %    Platelet Count 342 150 - 450 10e9/L    Diff Method Automated Method     % Neutrophils 50.7 %    % Lymphocytes 39.3 %    % Monocytes 4.4 %    % Eosinophils 5.2 %    % Basophils 0.4 %    % Immature Granulocytes 0.0 %    Nucleated RBCs 0 0 /100    Absolute Neutrophil 2.7 1.6 - 8.3 10e9/L    Absolute Lymphocytes 2.1 0.8 - 5.3 10e9/L    Absolute Monocytes 0.2 0.0 - 1.3 10e9/L    Absolute Eosinophils 0.3 0.0 - 0.7 10e9/L    Absolute Basophils 0.0 0.0 - 0.2 10e9/L    Abs Immature Granulocytes 0.0 0 - 0.4 10e9/L    Absolute Nucleated RBC 0.0    Comprehensive metabolic panel    Collection Time: 07/08/18  7:38 AM   Result Value Ref Range    Sodium 143 133 - 144 mmol/L    Potassium 4.1 3.4 - 5.3 mmol/L    Chloride 109 94 - 109 mmol/L    Carbon Dioxide 28 20 - 32 mmol/L    Anion Gap 6 3 - 14 mmol/L    Glucose 117 (H) 70 - 99 mg/dL    Urea Nitrogen 11 7 - 30 mg/dL    Creatinine 0.93 0.66 - 1.25 mg/dL    GFR Estimate >90 >60 mL/min/1.7m2    GFR Estimate If Black >90 >60 mL/min/1.7m2    Calcium 8.9 8.5 - 10.1 mg/dL    Bilirubin Total 0.7 0.2 - 1.3 mg/dL    Albumin 3.6 3.4 - 5.0 g/dL    Protein Total 7.1 6.8 - 8.8 g/dL    Alkaline  "Phosphatase 67 40 - 150 U/L    ALT 19 0 - 70 U/L    AST 16 0 - 45 U/L          Physical and Psychiatric Examination:     /84  Pulse 68  Temp 98.3  F (36.8  C) (Oral)  Resp 16  Ht 1.88 m (6' 2\")  Wt 74.4 kg (164 lb)  SpO2 100%  BMI 21.06 kg/m2  Weight is 164 lbs 0 oz  Body mass index is 21.06 kg/(m^2).                                           Last 4 weights:  Wt Readings from Last 4 Encounters:   07/07/18 74.4 kg (164 lb)   07/17/17 75.8 kg (167 lb)   05/15/17 67.8 kg (149 lb 8 oz)   05/01/16 77.1 kg (170 lb)       Physical Exam:  I have reviewed the physical exam as documented by Martita on 7/7 and agree with findings and assessment and have no additional findings to add at this time.    Mental Status Exam:  Uri is a 27-year-old male wearing a black shirt and is tall and thin.  His behavior is appropriate he does not have any abnormal movements.  His affect is somewhat guarded and irritable.  His mood he describes is wanting help.  His thought content consists of the above without thoughts of harming himself or others or any delusional content currently.  Thought process is ruminative without looseness of association.  He does not have any abnormal perceptions.  His attention and concentration appear adequate.  His cognition and fund of knowledge appears normal.  His long-term/short-term/remote memory appears intact.  His insight and judgment are both limited.         Admission Diagnoses:   Opiate use disorder severe  Cannabis use disorder  Cocaine use disorder  Alcohol use disorder  History of major depressive disorder  Likely personality disorder         Assessment & Plan:     Assessment:  Uri somewhat irritable during my interaction and makes [place] comments and other interjections throughout the interview process and appears guarded at times.  Not appear to be psychotic but most likely related to antisocial personality disorder traits and possibly borderline personality disorder traits at this " point unclear.  Would continue current detoxification protocol and discharge to chemical dependency treatment.    Plan:  Continue voluntary hospitalization with detoxification off of opiates and transition to treatment             Jasper Palm  [facility] Psychiatry      The following document has been created with voice recognition software and may contain unintentional word substitutions.        Non clinically relevant CMS requirements:  Clinical Global Impressions  First:     Most recent:       # Pain Assessment:  Current Pain Score 7/17/2017   Patient currently in pain? -   Pain score (0-10) 10       Any incidence of pain both chronic or acute reported will be documented in above documentation though further documentation can also be found in the internal medicine documentation or pain specialist documentation.

## 2018-07-08 NOTE — CONSULTS
[facility]  Internal Medicine Consult     Uri Valladares MRN# [id number]   Age: 27 year old YOB: 1990     Date of Admission: 7/7/2018  Date of Consult:  7/8/2018    Primary Care Provider: No Ref-Primary, Physician    Requesting Service: Psychiatry  Reason for Consult: fentanyl abuse      SUBJECTIVE   CC:   Fentanyl abuse   HPI:   Uri Vallaadres is a 26yo male with a hx of substance abuse who was admitted to [facility] detox seeking detox from fentanyl. Pt has been using 90-100mg a day. Has a hx of alcohol and marijuana use. He denies using alcohol recently and has never withdrawn from alcohol. He has been abusing opiates for a long time. Previously, oxycodone + percocet, but most recently fentanyl. He snorts and has never used IV drugs. He has never used heroin. He reports feeling depressed and having passive SI. He denies active SI or plan at this time. He is actively withdrawing with generalized aches, anxiety. He denies any hx of cardiac or pulmonary disease. He denies any chest pain, sob, dyspnea, fevers, chills, abdominal pain, bladder or bowel concerns at this time.      Past Medical History:     Past Medical History:   Diagnosis Date     Depressive disorder          Past Surgical History:    No past surgical history on file.      Social History:     Social History   Substance Use Topics     Smoking status: Light Tobacco Smoker     Packs/day: 0.50     Smokeless tobacco: Not on file     Alcohol use Yes      Comment: 4 times per week         Family History:   Reviewed - non contributory     Allergies:   No Known Allergies      Medications:   Reviewed. Please see MAR     Review of Systems:   10 point ROS of systems including Constitutional, Eyes, Respiratory, Cardiovascular, Gastroenterology, Genitourinary, Integumentary, Muscularskeletal, Psychiatric were all negative except for pertinent positives noted in my HPI.      OBJECTIVE   Physical Exam:   Vitals were  "reviewed  Blood pressure 118/73, pulse 84, temperature 98.3  F (36.8  C), temperature source Oral, resp. rate 18, height 1.88 m (6' 2\"), weight 74.4 kg (164 lb), SpO2 100 %.  General:alert, NAD, WDWN, pleasant and cooperative  HEENT: MMM  Cardiovascular: RRR, S1S2  Lungs:CTAB throughout  Abdomen: + BS, soft with no distention and no tenderness   Vascular: no peripheral edema, distal pulses palpable  Neurologic: AAO X 3, no focal deficits  Skin: no jaundice, rashes, or lesions on exposed areas of skin         Data:        Lab Results   Component Value Date     05/15/2017    Lab Results   Component Value Date    CHLORIDE 107 05/15/2017    Lab Results   Component Value Date    BUN 8 05/15/2017      Lab Results   Component Value Date    POTASSIUM 4.0 05/15/2017    Lab Results   Component Value Date    CO2 28 05/15/2017    Lab Results   Component Value Date    CR 0.82 05/15/2017        Lab Results   Component Value Date    WBC 5.2 07/08/2018    HGB 12.8 (L) 07/08/2018    HCT 39.8 (L) 07/08/2018    MCV 89 07/08/2018     07/08/2018     Lab Results   Component Value Date    WBC 5.2 07/08/2018         Assessment and Plan/Recommendations:   Uri Valladares is a 28yo male with a hx of substance abuse who was admitted to [address] detox seeking detox from fentanyl.    Opiod abuse and withdrawal. Abuses fentanyl 90-100mg qd. Snorts, has never used IV drugs. Ongoing withdrawal at this time. Defer management to psych, primary team.     Normocytic anemia. Pt reports poor po intake in setting of drug use. Recommend repeat with PCP once maintains sobriety, then could consider iron, b12, thiamine studies. No s/sx of bleeding.     Tobacco abuse. Cont nicotine patch.    Depression, anxiety. Defer management to psych, primary team.     Currently, medically stable and I will be happy to follow up and see again for any intercurrent medical issues. Thank you for the opportunity to be a part of this patient's " care.      Arielle Woodson [facility]  Internal Medicine GERRI Hospitalist  [phone]  July 8, 2018

## 2018-07-09 ENCOUNTER — TELEPHONE (OUTPATIENT)
Dept: BEHAVIORAL HEALTH | Facility: CLINIC | Age: 28
End: 2018-07-09

## 2018-07-09 PROCEDURE — 12800012 ZZH R&B CD MH INTERMEDIATE ADULT

## 2018-07-09 PROCEDURE — 99231 SBSQ HOSP IP/OBS SF/LOW 25: CPT | Performed by: PSYCHIATRY & NEUROLOGY

## 2018-07-09 PROCEDURE — 25000132 ZZH RX MED GY IP 250 OP 250 PS 637: Performed by: PSYCHIATRY & NEUROLOGY

## 2018-07-09 RX ADMIN — BUPRENORPHINE HCL 4 MG: 2 TABLET SUBLINGUAL at 08:13

## 2018-07-09 RX ADMIN — TRAZODONE HYDROCHLORIDE 50 MG: 50 TABLET ORAL at 22:54

## 2018-07-09 RX ADMIN — BUPRENORPHINE HCL 4 MG: 2 TABLET SUBLINGUAL at 20:18

## 2018-07-09 RX ADMIN — TRAZODONE HYDROCHLORIDE 50 MG: 50 TABLET ORAL at 23:58

## 2018-07-09 RX ADMIN — NICOTINE 1 PATCH: 21 PATCH, EXTENDED RELEASE TRANSDERMAL at 16:50

## 2018-07-09 ASSESSMENT — ACTIVITIES OF DAILY LIVING (ADL)
DRESS: INDEPENDENT
LAUNDRY: WITH SUPERVISION
GROOMING: INDEPENDENT
ORAL_HYGIENE: INDEPENDENT

## 2018-07-09 NOTE — TELEPHONE ENCOUNTER
"SBAR  Name:   Uri Valladares YOB: 1990 Age:  27 year old Gender:  male   Insurance:   [employer]: PMAP   Precipitating Event:   Treatment due to own awareness of need for help, Treatment to avoid loss of relationship(s) and Treatment due to unstable housing or homelessness   DOC:   Heroin  Additional abused substances:   Alcohol, Marijuana and Nicotine   Medical:   No chronic health problems   Mental Health:   Depression and Significant grief and loss issues   Previous Detox admissions & CD treatments:  1 prior IP detoxification admission(s).  No prior CD treatment(s).   Psychosocial:  Single, in no serious relationship  2 minor child(marielena)  Homeless  Minimal support network, Tendency to isolate from others, Relationship conflict with family members or friends due to substance abuse, No history of legal charges, Current legal issues, Unemployed and Financial problems   [place] Suicide Risk Status:    1. - Low Risk: Evaluation Counselors:  Document in Epic / SBAR to counselor \"Low Risk\".      Treatment Counselors:  Reassess upon admission as applicable, assess weekly in progress notes under Dimension 3 and summarize in Discharge / Treatment summary under Dimension 3.     Additional Info as needed: There was no addtional information to provide at this time.  Please see the above suicide risk rating information.         "
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
## 2018-07-09 NOTE — PROGRESS NOTES
Met with Pt and completed assessment and addendum.  SBAR complete and assessment and placement summary faxed to intake.  Awaiting approval from evaluation office.

## 2018-07-09 NOTE — PROGRESS NOTES
"CLINICAL NUTRITION SERVICES - ASSESSMENT NOTE     Nutrition Prescription    RECOMMENDATIONS FOR MDs/PROVIDERS TO ORDER:  None today    Malnutrition Status:    Unable to determine due to incomplete nutrition status validation.    Recommendations already ordered by Registered Dietitian (RD):  -Entered order to send double portions at meals per staff agreement.    Future/Additional Recommendations:  -Follow up to see pt as able and obtain nutrition hx.    -Monitor for acceptance of double portions and adjust as needed.  -Monitor po intakes and weight trends.     REASON FOR ASSESSMENT  Uri Valladares is a/an 27 year old male assessed by the dietitian for Admission Nutrition Risk Screen for unintentional loss of 10# or more in the past two months-noted in comments: (\"due to homelessness and use\").    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    CURRENT NUTRITION ORDERS  Diet: Regular  Intake/Tolerance:  Per staff pt has been eating everything at meals and asking for more food.      LABS  Labs reviewed    MEDICATIONS  Medications reviewed    ANTHROPOMETRICS  Height: 188 cm (6' 2\")  Most Recent Weight: 74.4 kg (164 lb)    IBW: 190 lbs  %IBW:  86%  BMI: Normal BMI  Weight History:  Per chart review, no recent weight hx since ~ one year ago.   Unable to obtain weight hx from pt today.    Wt Readings from Last 10 Encounters:   07/07/18 74.4 kg (164 lb)   07/17/17 75.8 kg (167 lb)   05/15/17 67.8 kg (149 lb 8 oz)   05/01/16 77.1 kg (170 lb)   10/28/14 72.6 kg (160 lb)       Dosing Weight: 74 kg (current weight)    ASSESSED NUTRITION NEEDS  Estimated Energy Needs: 2210-9062 kcals/day (30 - 35 kcals/kg )  Justification: Repletion  Estimated " Protein Needs: 74-89 grams protein/day (1 - 1.2 grams of pro/kg)  Justification: Repletion  Estimated Fluid Needs:  (1 mL/kcal)   Justification: Per provider pending fluid status    PHYSICAL FINDINGS  See malnutrition section below.  No peripheral edema    MALNUTRITION  % Intake: Unable to assess  % Weight Loss: Unable to assess  Subcutaneous Fat Loss: Unable to assess  Muscle Loss: Unable to assess  Fluid Accumulation/Edema: None noted  Malnutrition Diagnosis: Unable to determine due to incomplete nutrition status validation.    NUTRITION DIAGNOSIS  Predicted suboptimal nutrient intake related to decreased intakes due to substance use and homeless status as evidenced by (+) nutrition risk screen for unintended weight loss.      INTERVENTIONS  Implementation  Nutrition Education: Unable to complete due to pt not available.     Goals  Patient to consume % of nutritionally adequate meal trays TID, or the equivalent with supplements/snacks.     Monitoring/Evaluation  Progress toward goals will be monitored and evaluated per protocol.    Jenifer Ferguson RD, LD
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
## 2018-07-09 NOTE — PROGRESS NOTES
Met with Pt to initiate discharge planning.  Pt would like a direct referral to [facility].  Coached Pt to complete paperwork for assessment and referral.  Pt also requesting Suboxone maintenance.  Will request referral to [facility] from MD.  Pt would like to discharge to  tomorrow.

## 2018-07-09 NOTE — PROGRESS NOTES
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 collateral contacts supported/largely agreed with information from the client and associated risk ratings.      Rule 25 Assessment Summary and Plan   's Recommendation    [facility]  Follow the recommendations of your program  Support group participation with a male sponsor  Suboxone maintenance  Keep appointments with providers  Take medications as prescribed  Consider individual therapy      Collateral Contacts     Name:    [facility]   Relationship:       Phone Number:     Releases:         Jasper Christy MD Physician Signed Psychiatry H&P   Date of Service: 7/8/2018  1:07 PM Creation Time: 7/8/2018  1:07 PM          [facility]   Psychiatric History & Physical  Admission date: 7/7/2018  Uri Valladares  [id number]  07/[birth year]     Time: 67 minutes on encounter, >50% of which was spent in counseling and/or coordination of care consisting of: communication and education with the patient, communication with family and or friends if documented in note, lab/image/study evaluation, support staff communication, and other sources pertinent to excellent patient care.               Chief Complaint:   \"Here to get off of opiates \"         HPI:   Uri Valladares with a past medical history of major depressive disorder, chronic " shoulder pain that dislocates, cannabis use, opiate use was admitted 7/7/2018 for wanting to detoxify off of fentanyl.     According to records was using fentanyl and potentially attempted overdose prior to hospitalization and once detoxification off of the substance.  Upon meeting with him reports that he is not currently thinking about suicide but sometimes he does think about it.  Is not currently planning his death but is interested in getting off opiates and going to a treatment facility.  Denies any anhedonia or hopelessness or helplessness or thoughts of harming others any sleep dysfunction or attention or concentration issues.  Denies any previous obsessive-compulsive disorder symptoms posttraumatic stress disorder generalized anxiety paranoia or hallucinations or social anxiety or eating disorder symptoms or previous manic episodes.  Denies any gambling addiction pornography addiction sexual addiction or shopping addiction.     Is interested in going to treatment facility after detoxification off of opiates.     Physically is feeling healthy other than withdrawal symptoms.         Past Psychiatric History:      Has a history of major depressive disorder and previous overdose on opiates 2 previous inpatient hospitalizations no traumatic brain injury no electroconvulsive therapy no seizures.  No current psychiatrist no previous medications for depression.  Denies any previous commitments or previous violence.  Previously spent time in snf for possession of oxycodone currently on probation.            Substance Use and History:      Substance use started at the age of 11 with cannabis last used yesterday, cocaine started at the age of 17 last used 1 week ago, opiate use started at age 17 last use yesterday, alcohol use started at age 12 last used a few days ago, 1 previous DUI and has never been to treatment.  He has done methamphetamine in the past but denies that is a current substance.            Past  Medical History:   PAST MEDICAL HISTORY:    Past Medical History         Past Medical History:   Diagnosis Date     Depressive disorder              PAST SURGICAL HISTORY:    Past Surgical History    No past surgical history on file.               Family History:   FAMILY HISTORY:    Family History            Family History   Problem Relation Age of Onset     Substance Abuse Mother       Depression Mother                  Social History:   SOCIAL HISTORY:   Social History            Social History     Marital status: Single       Spouse name: N/A     Number of children: N/A     Years of education: N/A               Social History Main Topics     Smoking status: Light Tobacco Smoker       Packs/day: 0.50     Smokeless tobacco: Not on file     Alcohol use Yes         Comment: 4 times per week     Drug use: No     Sexual activity: Not on file           Other Topics Concern     Not on file          Social History Narrative     Born in [place] spent time in [place] and also [place] has some family members here is primarily living in his car for the past 5 months no access to guns or weapons has limited contact with family and children; enjoys drawing.              Physical ROS:   The patient endorsed the above issues. The remainder of 10-point review of systems was negative except as noted in HPI.          PTA Medications:       Prescriptions Prior to Admission    No prescriptions prior to admission.              Allergies:   No Known Allergies       Labs:       Recent Results            Recent Results (from the past 48 hour(s))   Drug abuse screen 6 urine (tox)     Collection Time: 07/07/18  4:16 PM   Result Value Ref Range     Amphetamine Qual Urine Negative NEG^Negative     Barbiturates Qual Urine Negative NEG^Negative     Benzodiazepine Qual Urine Negative NEG^Negative     Cannabinoids Qual Urine Positive (A) NEG^Negative     Cocaine Qual Urine Negative NEG^Negative     Ethanol Qual Urine Negative NEG^Negative      "Opiates Qualitative Urine Negative NEG^Negative   CBC with platelets differential     Collection Time: 07/08/18  7:38 AM   Result Value Ref Range     WBC 5.2 4.0 - 11.0 10e9/L     RBC Count 4.49 4.4 - 5.9 10e12/L     Hemoglobin 12.8 (L) 13.3 - 17.7 g/dL     Hematocrit 39.8 (L) 40.0 - 53.0 %     MCV 89 78 - 100 fl     MCH 28.5 26.5 - 33.0 pg     MCHC 32.2 31.5 - 36.5 g/dL     RDW 13.7 10.0 - 15.0 %     Platelet Count 342 150 - 450 10e9/L     Diff Method Automated Method       % Neutrophils 50.7 %     % Lymphocytes 39.3 %     % Monocytes 4.4 %     % Eosinophils 5.2 %     % Basophils 0.4 %     % Immature Granulocytes 0.0 %     Nucleated RBCs 0 0 /100     Absolute Neutrophil 2.7 1.6 - 8.3 10e9/L     Absolute Lymphocytes 2.1 0.8 - 5.3 10e9/L     Absolute Monocytes 0.2 0.0 - 1.3 10e9/L     Absolute Eosinophils 0.3 0.0 - 0.7 10e9/L     Absolute Basophils 0.0 0.0 - 0.2 10e9/L     Abs Immature Granulocytes 0.0 0 - 0.4 10e9/L     Absolute Nucleated RBC 0.0     Comprehensive metabolic panel     Collection Time: 07/08/18  7:38 AM   Result Value Ref Range     Sodium 143 133 - 144 mmol/L     Potassium 4.1 3.4 - 5.3 mmol/L     Chloride 109 94 - 109 mmol/L     Carbon Dioxide 28 20 - 32 mmol/L     Anion Gap 6 3 - 14 mmol/L     Glucose 117 (H) 70 - 99 mg/dL     Urea Nitrogen 11 7 - 30 mg/dL     Creatinine 0.93 0.66 - 1.25 mg/dL     GFR Estimate >90 >60 mL/min/1.7m2     GFR Estimate If Black >90 >60 mL/min/1.7m2     Calcium 8.9 8.5 - 10.1 mg/dL     Bilirubin Total 0.7 0.2 - 1.3 mg/dL     Albumin 3.6 3.4 - 5.0 g/dL     Protein Total 7.1 6.8 - 8.8 g/dL     Alkaline Phosphatase 67 40 - 150 U/L     ALT 19 0 - 70 U/L     AST 16 0 - 45 U/L              Physical and Psychiatric Examination:      /84  Pulse 68  Temp 98.3  F (36.8  C) (Oral)  Resp 16  Ht 1.88 m (6' 2\")  Wt 74.4 kg (164 lb)  SpO2 100%  BMI 21.06 kg/m2  Weight is 164 lbs 0 oz  Body mass index is 21.06 kg/(m^2).                                Last 4 weights:      Wt " Readings from Last 4 Encounters:   07/07/18 74.4 kg (164 lb)   07/17/17 75.8 kg (167 lb)   05/15/17 67.8 kg (149 lb 8 oz)   05/01/16 77.1 kg (170 lb)         Physical Exam:  I have reviewed the physical exam as documented by Martita on 7/7 and agree with findings and assessment and have no additional findings to add at this time.     Mental Status Exam:  Uri is a 27-year-old male wearing a black shirt and is tall and thin.  His behavior is appropriate he does not have any abnormal movements.  His affect is somewhat guarded and irritable.  His mood he describes is wanting help.  His thought content consists of the above without thoughts of harming himself or others or any delusional content currently.  Thought process is ruminative without looseness of association.  He does not have any abnormal perceptions.  His attention and concentration appear adequate.  His cognition and fund of knowledge appears normal.  His long-term/short-term/remote memory appears intact.  His insight and judgment are both limited.          Admission Diagnoses:   Opiate use disorder severe  Cannabis use disorder  Cocaine use disorder  Alcohol use disorder  History of major depressive disorder  Likely personality disorder          Assessment & Plan:      Assessment:  Uri somewhat irritable during my interaction and makes [place] comments and other interjections throughout the interview process and appears guarded at times.  Not appear to be psychotic but most likely related to antisocial personality disorder traits and possibly borderline personality disorder traits at this point unclear.  Would continue current detoxification protocol and discharge to chemical dependency treatment.     Plan:  Continue voluntary hospitalization with detoxification off of opiates and transition to treatment               Jasper Palm  [facility] Psychiatry        The following document has been created with voice recognition software and  may contain unintentional word substitutions.           Non clinically relevant CMS requirements:  Clinical Global Impressions  First:      Most recent:         # Pain Assessment:  Current Pain Score 7/17/2017   Patient currently in pain? -   Pain score (0-10) 10         Any incidence of pain both chronic or acute reported will be documented in above documentation though further documentation can also be found in the internal medicine documentation or pain specialist documentation.                               Collateral Contacts     Name:       Relationship:       Phone Number:       Releases:             sandhya Contacts      A problematic pattern of alcohol/drug use leading to clinically significant impairment or distress, as manifested by at least two of the following, occurring within a 12-month period:    Alcohol/drug is often taken in larger amounts or over a longer period than was intended.  A great deal of time is spent in activities necessary to obtain alcohol, use alcohol, or recover from its effects.  Craving, or a strong desire or urge to use alcohol/drug  Recurrent alcohol/drug use resulting in a failure to fulfill major role obligations at work, school or home.  Continued alcohol use despite having persistent or recurrent social or interpersonal problems caused or exacerbated by the effects of alcohol/drug.  Important social, occupational, or recreational activities are given up or reduced because of alcohol/drug use.  Recurrent alcohol/drug use in situations in which it is physically hazardous.  Alcohol/drug use is continued despite knowledge of having a persistent or recurrent physical or psychological problem that is likely to have been caused or exacerbated by alcohol.  Tolerance, as defined by either of the following: A need for markedly increased amounts of alcohol/drug to achieve intoxication or desired effect. and A markedly diminished effect with continued use of the same amount of  alcohol/drug.  Withdrawal, as manifested by either of the following: The characteristic withdrawal syndrome for alcohol/drug (refer to Criteria A and B of the criteria set for alcohol/drug withdrawal). and Alcohol/drug (or a closely related substance, such as a benzodiazepine) is taken to relieve or avoid withdrawal symptoms.      Specify if: In early remission:  After full criteria for alcohol/drug use disorder were previously met, none of the criteria for alcohol/drug use disorder have been met for at least 3 months but for less than 12 months (with the exception that Criterion A4,  Craving or a strong desire or urge to use alcohol/drug  may be met).     In sustained remission:   After full criteria for alcohol use disorder were previously met, non of the criteria for alcohol/drug use disorder have been met at any time during a period of 12 months or longer (with the exception that Criterion A4,  Craving or strong desire or urge to use alcohol/drug  may be met).   Specify if:   This additional specifier is used if the individual is in an environment where access to alcohol is restricted.    Mild: Presence of 2-3 symptoms    Moderate: Presence of 4-5 symptoms    Severe: Presence of 6 or more symptoms

## 2018-07-09 NOTE — PLAN OF CARE
Behavioral Team Discussion: (7/9/2018)    Continued Stay Criteria/Rationale: Patient admitted for opioid Use Disorder.  Plan: The following services will be provided to the patient; psychiatric assessment, medication management, therapeutic milieu, individual and group support, and skills groups.   Participants: 3A Provider: Dr. Radha MD; 3A RN's: Sally Selisker, RN; 3A CM's: Dayana Perez .  Summary/Recommendation: Providers will assess today for treatment recommendations, discharge planning, and aftercare plans. CM will meet with pt for discharge planning.   Medical/Physical: Deferred (see medical notes).  Precautions:   Behavioral Orders   Procedures     Code 1 - Restrict to Unit     Routine Programming     As clinically indicated     Status 15     Every 15 minutes.     Suicide precautions     Patients on Suicide Precautions should have a Combination Diet ordered that includes a Diet selection(s) AND a Behavioral Tray selection for Safe Tray - with utensils, or Safe Tray - NO utensils       Withdrawal precautions     Rationale for change in precautions or plan: N/A  Progress: No Change.

## 2018-07-09 NOTE — TELEPHONE ENCOUNTER
7/9/2018 Please put patient on the men's priority LP list, he is a patient on 3A    Patient is not an IV user.    Patient's insurance is [employer].     [place] [phone] while on 3A.  Best secondary tel contact number is home, [phone].    Adams Tylre [facility], [facility]

## 2018-07-09 NOTE — PROGRESS NOTES
"[facility], [place]   Psychiatric Progress Note  Hospital Day: 2        Interim History:   The patient's care was discussed with the treatment team during the daily team meeting and/or staff's chart notes were reviewed.  Staff report patient endorsing brief intermittent SI when it was discussed that there was a bed available at [facility] today.  He said \"I just do not feel strong enough yet to go.\"  He explained to staff that he hopes to avoid all substances, including nicotine though feels that if he left detox he would want to smoke cigarettes.    Upon interview, the patient confirmed the above.  He states that he will be ready for discharge tomorrow.  When asked about suicidal thoughts, he said that this morning he \"thought about death\" momentarily.  He denies any intent or plan.  He is future oriented and expresses desire to move back to [place] after completion of chemical dependency treatment so that he can get the support that he needs from his mother.  He mentions that his mother is a recovering addict and is currently sponsoring several people in .  He is in agreement with plan to discharge to [facility] tomorrow.  He denies side effects to Subutex and notes resolution of withdrawal symptoms.         Medications:       buprenorphine  4 mg Sublingual BID     nicotine  1 patch Transdermal Daily     nicotine   Transdermal Q8H     nicotine   Transdermal Daily          Allergies:   No Known Allergies       Labs:   No results found for this or any previous visit (from the past 24 hour(s)).       Psychiatric Examination:     /81  Pulse 68  Temp 96.4  F (35.8  C) (Tympanic)  Resp 16  Ht 1.88 m (6' 2\")  Wt 74.4 kg (164 lb)  SpO2 100%  BMI 21.06 kg/m2  Weight is 164 lbs 0 oz  Body mass index is 21.06 kg/(m^2).                                             Appearance: awake, alert and adequately groomed  Attitude:  cooperative  Eye Contact:  good  Mood:  better  Affect:  " appropriate and in normal range and mood congruent.  Patient does not appear to be depressed.  Bright affect noted.  Speech:  clear, coherent  Language: fluent and intact in English  Psychomotor, Gait, Musculoskeletal:  no evidence of tardive dyskinesia, dystonia, or tics  Throught Process:  logical, linear and goal oriented  Associations:  no loose associations  Thought Content:  no evidence of suicidal ideation or homicidal ideation and no evidence of psychotic thought  Insight:  Fair, acknowledges need for CD treatment  Judgement:  intact  Oriented to:  time, person, and place  Attention Span and Concentration:  intact  Recent and Remote Memory:  intact  Fund of Knowledge:  appropriate    Clinical Global Impressions  First:  Considering your total clinical experience with this particular patient population, how severe are the patient's symptoms at this time?: 2 (07/09/18 1756)  Compared to the patient's condition at the START of treatment, this patient's condition is:: 1 (07/09/18 1756)  Most recent:  Considering your total clinical experience with this particular patient population, how severe are the patient's symptoms at this time?: 2 (07/09/18 1756)  Compared to the patient's condition at the START of treatment, this patient's condition is:: 1 (07/09/18 1756)    # Pain Assessment:  Current Pain Score 7/17/2017   Patient currently in pain? -   Pain score (0-10) 10   Uri s pain level was assessed and he currently denies pain.           Precautions:     Behavioral Orders   Procedures     Code 1 - Restrict to Unit     Routine Programming     As clinically indicated     Status 15     Every 15 minutes.     Suicide precautions     Patients on Suicide Precautions should have a Combination Diet ordered that includes a Diet selection(s) AND a Behavioral Tray selection for Safe Tray - with utensils, or Safe Tray - NO utensils       Withdrawal precautions          Diagnoses:      Opiate use disorder severe  Cannabis  use disorder  Cocaine use disorder  Alcohol use disorder  History of major depressive disorder  Likely personality disorder         Assessment & Plan:     Assessment and hospital summary:  Patient presented to our facility seeking detox off of opiates and transition to inpatient CD treatment.  He has stabilized on Subutex 4 mg twice daily.  He is interested in Suboxone maintenance upon discharge and will be discharged to [facility] plus tomorrow a.m.    Target psychiatric symptoms and interventions:  Continue current medications without changes    Medical Problems and Treatments:  Patient denies acute medical concerns    Behavioral/Psychological/Social:  Continue to encourage participation in groups  Patient to complete chemical dependency assessment    Legal: Voluntary     Disposition: Plan is to discharge patient tomorrow a.m. to [facility] with plan to resume Suboxone maintenance and establish care with Suboxone prescriber prior to discharge.    Kayla Shultz MD  [facility] Psychiatry

## 2018-07-10 ENCOUNTER — HOSPITAL ENCOUNTER (OUTPATIENT)
Dept: BEHAVIORAL HEALTH | Facility: CLINIC | Age: 28
End: 2018-07-10
Attending: SOCIAL WORKER
Payer: COMMERCIAL

## 2018-07-10 VITALS
OXYGEN SATURATION: 100 % | SYSTOLIC BLOOD PRESSURE: 129 MMHG | RESPIRATION RATE: 16 BRPM | WEIGHT: 164 LBS | BODY MASS INDEX: 21.05 KG/M2 | HEIGHT: 74 IN | TEMPERATURE: 97.6 F | DIASTOLIC BLOOD PRESSURE: 71 MMHG | HEART RATE: 87 BPM

## 2018-07-10 PROBLEM — F19.20 CHEMICAL DEPENDENCY (H): Status: ACTIVE | Noted: 2018-07-10

## 2018-07-10 PROCEDURE — 25000132 ZZH RX MED GY IP 250 OP 250 PS 637: Performed by: PSYCHIATRY & NEUROLOGY

## 2018-07-10 PROCEDURE — 10020000 ZZH LODGING PLUS FACILITY CHARGE ADULT

## 2018-07-10 PROCEDURE — 99238 HOSP IP/OBS DSCHRG MGMT 30/<: CPT | Performed by: PSYCHIATRY & NEUROLOGY

## 2018-07-10 RX ORDER — BUPRENORPHINE AND NALOXONE 4; 1 MG/1; MG/1
1 FILM, SOLUBLE BUCCAL; SUBLINGUAL 2 TIMES DAILY
Qty: 16 FILM | Refills: 0 | Status: SHIPPED | OUTPATIENT
Start: 2018-07-10 | End: 2019-02-08

## 2018-07-10 RX ORDER — MAGNESIUM HYDROXIDE/ALUMINUM HYDROXICE/SIMETHICONE 120; 1200; 1200 MG/30ML; MG/30ML; MG/30ML
30 SUSPENSION ORAL EVERY 6 HOURS PRN
COMMUNITY
End: 2018-09-06

## 2018-07-10 RX ORDER — TRAZODONE HYDROCHLORIDE 50 MG/1
50 TABLET, FILM COATED ORAL
Qty: 30 TABLET | Refills: 1 | Status: SHIPPED | OUTPATIENT
Start: 2018-07-10 | End: 2018-07-26

## 2018-07-10 RX ORDER — AMOXICILLIN 250 MG
2 CAPSULE ORAL DAILY PRN
COMMUNITY

## 2018-07-10 RX ORDER — LORATADINE 10 MG/1
10 TABLET ORAL DAILY PRN
COMMUNITY

## 2018-07-10 RX ADMIN — NICOTINE 1 PATCH: 21 PATCH, EXTENDED RELEASE TRANSDERMAL at 08:00

## 2018-07-10 RX ADMIN — BUPRENORPHINE HCL 4 MG: 2 TABLET SUBLINGUAL at 08:00

## 2018-07-10 RX ADMIN — BUPRENORPHINE HCL 4 MG: 2 TABLET SUBLINGUAL at 13:37

## 2018-07-10 ASSESSMENT — ACTIVITIES OF DAILY LIVING (ADL)
ORAL_HYGIENE: INDEPENDENT
GROOMING: INDEPENDENT
DRESS: INDEPENDENT

## 2018-07-10 NOTE — PROGRESS NOTES
[facility] Nursing Health Assessment      Vital signs:     There were no vitals taken for this visit.      Transfer from     Counselor: Cyn  Drug of Choice: Opiates/pills  Last use: July 7th, 2018  Home clinic/MD: [facility] [place]   Psychiatrist/therapist: none    Medical history/current conditions:  none    H&P Screen:  H&P within the last 90 days: Yes.  Date: NA Location: /[facility] 7/10/2018      Mental Health diagnosis: denies  Medication compliant?: na  Recent sucidal thoughts? yes     When? 7/6/2018 (addressed in 3A/detox).  currently denies  Current thought of self-harm? no    Plan? na    Pain assessment:   Pt. Experiencing pain at this time?  Yes.  Date: 7/10/2018 Location: /[facility]      Nursing Assessment Summary:  Will pass on to counselors pt recent thoughts of suicide    On-going nursing intervention required?   No    Acute care visit recommended: no

## 2018-07-10 NOTE — PROGRESS NOTES
Name: Uri Valladares  Date: 7/10/2018  Medical Record: [id number]    Envelope Number: [id number]    List of Contents (List each item separately in new row):   Cell phone & cord    Admission:  I am responsible for any personal items that are not sent to the safe or pharmacy.  [facility] is not responsible for loss, theft or damage of any property in my possession.      Patient Signature:  ___________________________________________       Date/Time:__________________________    Staff Signature: __________________________________       Date/Time:__________________________    2nd Staff person, if patient is unable/unwilling to sign:      __________________________________________________________       Date/Time: __________________________      Discharge:  [facility] has returned all of my personal belongings:    Patient Signature: ________________________________________     Date/Time: ____________________________________    Staff Signature: ______________________________________     Date/Time:_____________________________________

## 2018-07-10 NOTE — PROGRESS NOTES
Initial Services Plan        Before your first treatment group, please do the following    Immediate health & safety concerns: Go to the emergency room if you start to have withdrawal symptoms.  Look for a support network (such as AA, NA, DBT group, a Anglican group, etc.)    Suggestions for client during the time between intake & completion of treatment plan:  Tour your treatment center (unit or outpatient clinic).  Introduce yourself to the treatment group.  Spend time getting to know your peers.    Client issues to be addressed in the first treatment sessions:  Other depressed, accept the damage that I have done in my life,  I miss my children      Joselyn Hamlin, RN  7/10/2018  2:40 PM
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
## 2018-07-10 NOTE — DISCHARGE SUMMARY
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     According to records was using fentanyl and potentially attempted overdose prior to hospitalization and once detoxification off of the substance.  Upon meeting with him reports that he is not currently thinking about suicide but sometimes he does think about it.  Is not currently planning his death but is interested in getting off opiates and going to a treatment facility.  Denies any anhedonia or hopelessness or helplessness or thoughts of harming others any sleep dysfunction or attention or concentration issues.  Denies any previous obsessive-compulsive disorder symptoms posttraumatic stress disorder generalized anxiety paranoia or hallucinations or social anxiety or eating disorder symptoms or previous manic episodes.  Denies any gambling addiction pornography addiction sexual addiction or shopping addiction.     Is interested in going to treatment facility after detoxification off of opiates.     Physically is feeling healthy other than withdrawal symptoms.       See Admission note by Jasper Christy MD on 7/8/18 for additional details.          Diagnoses:     Opiate use disorder severe  Cannabis use disorder  Cocaine use disorder  Alcohol use disorder  History of major depressive disorder  Likely personality disorder         Labs:     Recent Results (from the past 168 hour(s))   Drug abuse screen 6 urine (tox)    Collection Time: 07/07/18  4:16 PM   Result Value Ref Range     Amphetamine Qual Urine Negative NEG^Negative    Barbiturates Qual Urine Negative NEG^Negative    Benzodiazepine Qual Urine Negative NEG^Negative    Cannabinoids Qual Urine Positive (A) NEG^Negative    Cocaine Qual Urine Negative NEG^Negative    Ethanol Qual Urine Negative NEG^Negative    Opiates Qualitative Urine Negative NEG^Negative   CBC with platelets differential    Collection Time: 07/08/18  7:38 AM   Result Value Ref Range    WBC 5.2 4.0 - 11.0 10e9/L    RBC Count 4.49 4.4 - 5.9 10e12/L    Hemoglobin 12.8 (L) 13.3 - 17.7 g/dL    Hematocrit 39.8 (L) 40.0 - 53.0 %    MCV 89 78 - 100 fl    MCH 28.5 26.5 - 33.0 pg    MCHC 32.2 31.5 - 36.5 g/dL    RDW 13.7 10.0 - 15.0 %    Platelet Count 342 150 - 450 10e9/L    Diff Method Automated Method     % Neutrophils 50.7 %    % Lymphocytes 39.3 %    % Monocytes 4.4 %    % Eosinophils 5.2 %    % Basophils 0.4 %    % Immature Granulocytes 0.0 %    Nucleated RBCs 0 0 /100    Absolute Neutrophil 2.7 1.6 - 8.3 10e9/L    Absolute Lymphocytes 2.1 0.8 - 5.3 10e9/L    Absolute Monocytes 0.2 0.0 - 1.3 10e9/L    Absolute Eosinophils 0.3 0.0 - 0.7 10e9/L    Absolute Basophils 0.0 0.0 - 0.2 10e9/L    Abs Immature Granulocytes 0.0 0 - 0.4 10e9/L    Absolute Nucleated RBC 0.0    Comprehensive metabolic panel    Collection Time: 07/08/18  7:38 AM   Result Value Ref Range    Sodium 143 133 - 144 mmol/L    Potassium 4.1 3.4 - 5.3 mmol/L    Chloride 109 94 - 109 mmol/L    Carbon Dioxide 28 20 - 32 mmol/L    Anion Gap 6 3 - 14 mmol/L    Glucose 117 (H) 70 - 99 mg/dL    Urea Nitrogen 11 7 - 30 mg/dL    Creatinine 0.93 0.66 - 1.25 mg/dL    GFR Estimate >90 >60 mL/min/1.7m2    GFR Estimate If Black >90 >60 mL/min/1.7m2    Calcium 8.9 8.5 - 10.1 mg/dL    Bilirubin Total 0.7 0.2 - 1.3 mg/dL    Albumin 3.6 3.4 - 5.0 g/dL    Protein Total 7.1 6.8 - 8.8 g/dL    Alkaline Phosphatase 67 40 - 150 U/L    ALT 19 0 - 70 U/L    AST 16 0 - 45 U/L          Consults:   Consultation during this admission  received from internal medicine:    Uri Valladares is a 28yo male with a hx of substance abuse who was admitted to [facility] 3A detox seeking detox from fentanyl.     Opiod abuse and withdrawal. Abuses fentanyl 90-100mg qd. Snorts, has never used IV drugs. Ongoing withdrawal at this time. Defer management to psych, primary team.      Normocytic anemia. Pt reports poor po intake in setting of drug use. Recommend repeat with PCP once maintains sobriety, then could consider iron, b12, thiamine studies. No s/sx of bleeding.      Tobacco abuse. Cont nicotine patch.     Depression, anxiety. Defer management to psych, primary team.      Currently, medically stable and I will be happy to follow up and see again for any intercurrent medical issues. Thank you for the opportunity to be a part of this patient's care.        Arielle Woodson [facility]  Internal Medicine [facility] Course:   Uri Valladares was admitted to [facility] 3A detox unit with attending Dariela Shultz MD as a voluntary patient. The patient was placed under status 15 (15 minute checks) to ensure patient safety.     The patient is admitted to [facility] 3A with routine precautions, activities and p.r.n. medications.  After discussion of the indications, risks, benefits, alternatives and consequences of no treatment, the patient elected to detox from heroin via buprenorphine.  He was given 4 mg twice daily with noted resolution of withdrawal symptoms. He tolerated Subutex well and requested ongoing suboxone maintenance following discharge. He also expressed desire to engage in CD treatment. He was discharged from detox directly to inpatient CD treatment at [facility].      Throughout hospitalization, patient did report passive, very brief and intermittent SI thoughts in the context of withdrawal and recent substance use. He denied significant depressive symptoms, however, and displayed bright affect when interacting with peers and  staff. He did not appear to be depressed. Pt also denied history of depression during extended periods of sobriety indicating that mood dysregulation was directly related to substance use/withdrawal. For these reasons, an antidepressant was not initiated. At the time of discharge, patient denied SI, SIB, and HI.  He is future oriented and expresses desire to move back to [place] after completion of chemical dependency treatment so that he can get the support that he needs from his mother.  He mentions that his mother is a recovering addict and is currently sponsoring several people in .      Uri Valladares did participate in groups and was visible in the milieu.     The patient's symptoms of withdrawal resolved.    # Discharge Pain Plan:   - Patient currently has NO PAIN and is not being prescribed pain medications on discharge.    Uri Valladares was released to inpatient CD treatment. At the time of discharge Uri Valladares was determined to not be a danger to himself or others.     SUICIDE RISK ASSESSMENT:  Today Uri Valladares denies SI, SIB, and HI.  He has notable risk factors for self-harm including previous suicide attempt, substance use / pending treatment, relationship conflict, financial/legal stress and recent inpt stay.  However, risk is mitigated by no h/o suicide attempt, no plan or intent, no h/o risky impulsive behavior, no access to lethal means, describes a safety plan, h/o seeking help when needed, symptom improvement, future oriented, feeling hopeful, none to minimal alcohol use , commitment to family, good social support   and participation in inpatient CD treatment.  Based on all available evidence he does not appear to be at imminent risk for self-harm therefore does not meet criteria for a 72-hr hold/ involuntary hospitalization.  However, based on degree of symptoms substance use treatment and close psych FU was recommended which the pt did agree to. Patient also agreed to call 911/present  to ED if any imminent safety concerns arise, including re-emergence of SI. Patient was provided with crisis resources at the time of discharge. Patient agreed to further reduce risk of self-harm by completely abstaining from illicit substances and alcohol. Expressed understanding of the risks associated with alcohol use and illicit drug use.           Discharge Medications:     Current Discharge Medication List      START taking these medications    Details   traZODone (DESYREL) 50 MG tablet Take 1 tablet (50 mg) by mouth nightly as needed for sleep  Qty: 30 tablet, Refills: 1    Associated Diagnoses: Mood disorder (H)                Psychiatric Examination:   Appearance: awake, alert and adequately groomed  Attitude:  cooperative  Eye Contact:  good  Mood:  better  Affect:  appropriate and in normal range and mood congruent.  Patient does not appear to be depressed.  Bright affect noted.  Speech:  clear, coherent  Language: fluent and intact in English  Psychomotor, Gait, Musculoskeletal:  no evidence of tardive dyskinesia, dystonia, or tics  Throught Process:  logical, linear and goal oriented  Associations:  no loose associations  Thought Content:  no evidence of suicidal ideation or homicidal ideation and no evidence of psychotic thought  Insight:  Fair, acknowledges need for CD treatment  Judgement:  intact  Oriented to:  time, person, and place  Attention Span and Concentration:  intact  Recent and Remote Memory:  intact  Fund of Knowledge:  appropriate         Discharge Plan:   Major Treatments, Procedures and Findings:  You were detoxified from opioidsusing the appropriate protocols. You have had a chemical dependency assessment.  You have had blood drawn, and the results have been reviewed with you.  Please take a copy of your laboratory work with you to your next provider appointment.     Symptoms to Report:  If you experience more anxiety, confusion, sleeplessness, deep sadness or thoughts of suicide,  notify your treatment team or notify your primary care physician. IF THE SYMPTOMS YOU ARE EXPERIENCING ARE A MEDICAL EMERGENCY, CALL 911 IMMEDIATELY.      Lifestyle Adjustment: Adjust your lifestyle to get enough sleep, relaxation, exercise and excellent nutrition. Continue to develop healthy coping skills to decrease stress and promote a sober living environment. Do not use alcohol, illegal drugs or addictive medications other than what is currently prescribed. AA, NA, and a sponsor are excellent resources for support.      Primary Provider:  Patient states that he will follow up as needed for future medical needs at the [facility], once secured in [facility].     Resources:  [facility] [phone] Support Group:  AA/NA and Sponsor/support.  Crisis Intervention: 484.573.8709 or 730-443-1481 (TTY: 171.650.6272). Call anytime for help.  National [place] on Mental Illness (www.mn.aga.org): 962.213.1042 or 816-161-5407.  Alcoholics Anonymous (www.alcoholics-anonymous.org): Check your phone book for your local chapter.  Suicide Awareness Voices of Education (www.save.org): 700-886-FRAT (5083)  National Suicide Prevention Line (www.mentalhealthmn.org): 555-098-ELWM (7965)  Mental Health Consumer/Survivor Network of [place] (www.mhcsn.net): 245.230.3236 or 272-032-2100.  Mental Health Association [place] (www.mentalhealth.org): 428.979.1192 or 881-819-3333  Substance Abuse and Mental Health Services. (www.samhsa.gov)     [place] Recovery Connection ([facility])  [facility] connects people seeking recovery to resources that help foster and sustain long-term recovery.  Whether you are seeking resources for treatment, transportation, housing, job training, education, health care or other pathways to recovery, [facility] is a great place to start. [phone] [email or web address]     General Medication Instruction: See your medication papers for instructions. Take all medicines as directed.  Make no changes  unless your doctor suggests them. Go to all your doctor visits.  Be sure to have all your required lab tests. This way, your medicines may be refilled on time. Do not use any drugs not prescribed by your provider. AA/NA and sponsors are excellent resources for support. Avoid alcohol at all costs!     Please Note:  If you have any questions at anytime after you are discharged please call the [facility], [place] detoxification yanez 3AW at [phone].  [facility], Behavioral Intake [phone]. Please take this discharge folder with you to all your follow up appointments, it contains your lab results, diagnosis, medication list and discharge recommendations.    THANK YOU [facility] CHOOSING THE [facility]    Attestation:  The patient has been seen and evaluated by me,  Dariela Shultz MD

## 2018-07-10 NOTE — DISCHARGE INSTRUCTIONS
Behavioral [facility] Discharge Planning and Instructions  THANK YOU FOR CHOOSING THE [facility]  [facility] [place]: [phone]    Summary: You were admitted to and processed through [facility] on July 7, 2018 for detoxification from opioids.  A medical examination was performed that included lab work. You have met with a  and opted to transfer directly to the [facility].  Please make your recovery a priority, Mr. Valladares.     Main Diagnosis:  Opioid Dependence    Major Treatments, Procedures and Findings:  You were detoxified from opioidsusing the appropriate protocols. You have had a chemical dependency assessment.  You have had blood drawn, and the results have been reviewed with you.  Please take a copy of your laboratory work with you to your next provider appointment.    Symptoms to Report:  If you experience more anxiety, confusion, sleeplessness, deep sadness or thoughts of suicide, notify your treatment team or notify your primary care physician. IF THE SYMPTOMS YOU ARE EXPERIENCING ARE A MEDICAL EMERGENCY, CALL 911 IMMEDIATELY.     Lifestyle Adjustment: Adjust your lifestyle to get enough sleep, relaxation, exercise and excellent nutrition. Continue to develop healthy coping skills to decrease stress and promote a sober living environment. Do not use alcohol, illegal drugs or addictive medications other than what is currently prescribed. STUART, ERICK, and a sponsor are excellent resources for support.     Primary Provider:  Patient states that he will follow up as needed for future medical needs at the [facility], once secured in [facility].    Resources:  [facility] [phone] Support Group:  STUART/ERICK and Sponsor/support.  Crisis Intervention: 223.459.8139 or 089-126-0406 (TTY: 470.905.5412). Call anytime for help.  National [place] on Mental Illness (www.mn.aga.org): 918.170.9108 or 061-120-0663.  Alcoholics Anonymous (www.alcoholics-anonymous.org):  Check your phone book for your local chapter.  Suicide Awareness Voices of Education (www.save.org): 920-602-KQBU (2043)  National Suicide Prevention Line (www.mentalhealthmn.org): 717-810-WIKA (6449)  Mental Health Consumer/Survivor Network of [place] (www.mhcsn.net): 868.725.4914 or 702-052-8801.  Mental Health Association [place] (www.mentalhealth.org): 113.639.5624 or 597-436-3461  Substance Abuse and Mental Health Services. (www.sama.gov)    [place] Recovery Connection ([facility])  [facility] connects people seeking recovery to resources that help foster and sustain long-term recovery.  Whether you are seeking resources for treatment, transportation, housing, job training, education, health care or other pathways to recovery, [facility] is a great place to start. 842.987.4176 www.[email or web address]    General Medication Instruction: See your medication papers for instructions. Take all medicines as directed.  Make no changes unless your doctor suggests them. Go to all your doctor visits.  Be sure to have all your required lab tests. This way, your medicines may be refilled on time. Do not use any drugs not prescribed by your provider. AA/NA and sponsors are excellent resources for support. Avoid alcohol at all costs!    Please Note:  If you have any questions at anytime after you are discharged please call the [facility] [place] detoxification yanez 3AW at [phone].  [facility], Behavioral Intake [phone]. Please take this discharge folder with you to all your follow up appointments, it contains your lab results, diagnosis, medication list and discharge recommendations.    THANK YOU FOR CHOOSING THE [facility]

## 2018-07-11 ENCOUNTER — HOSPITAL ENCOUNTER (OUTPATIENT)
Dept: BEHAVIORAL HEALTH | Facility: CLINIC | Age: 28
End: 2018-07-11
Attending: FAMILY MEDICINE
Payer: COMMERCIAL

## 2018-07-11 PROCEDURE — H2035 A/D TX PROGRAM, PER HOUR: HCPCS | Mod: HQ

## 2018-07-11 PROCEDURE — 10020000 ZZH LODGING PLUS FACILITY CHARGE ADULT

## 2018-07-11 NOTE — PROGRESS NOTES
"  Patient:  Uri Valladares    Date: July 11, 2018    Comprehensive Assessment UPDATE        Comprehensive Summary Update and Review  Counselor met with patient on 7/11/18 and reviewed the Comprehensive Assessment.    The following updates have been made:Other: I have reviewed the information on the assessment, psychosocial and medical history and checklist:        The following changes have been made  : Dimension 2: Risk rating was changed from a \"1\" to a \"0\" due to patient denying medical concerns or issues. Dimension 3: Risk rating was changed from a \" 1\" to a \"2\". Patient shares a history of Major Depressive disorder, poor impulse control and coping skills. Dimension 4: Risk rating was changed from a \" 0\" to a \"1\" due to patient having some external motivation due to upcoming court date. Also, due to the patient's primary drug of choice as Opioids, and use history, patient's primary, secondary and tertiary order was changed from  Alcohol Use Disorder, Moderate 303.90 (F10.20), Cannabis Use Disorder Severe 304.30 ( F12.20), Opioid Use Disorder Severe  304.00 ( F11.20), to Opioid Use Disorder Severe  304.00 ( F11.20), Cannabis Use Disorder Severe 304.30 ( F12.20), Alcohol Use Disorder, Moderate. 303.90 (F10.20).      "
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
## 2018-07-12 ENCOUNTER — HOSPITAL ENCOUNTER (OUTPATIENT)
Dept: BEHAVIORAL HEALTH | Facility: CLINIC | Age: 28
End: 2018-07-12
Attending: FAMILY MEDICINE
Payer: COMMERCIAL

## 2018-07-12 PROCEDURE — H2035 A/D TX PROGRAM, PER HOUR: HCPCS

## 2018-07-12 PROCEDURE — 10020000 ZZH LODGING PLUS FACILITY CHARGE ADULT

## 2018-07-12 PROCEDURE — H2035 A/D TX PROGRAM, PER HOUR: HCPCS | Mod: HQ

## 2018-07-12 NOTE — PROGRESS NOTES
Patient Safety Plan Template    Name:   Uri Valladares YOB: 1990 Age:  27 MR Number:  [id number]   Step 1: Warning signs (Thoughts, images, mood, situation, behavior) that a crisis may be developin.  Isolation   2.   Feeling tired all the time, just want to sleep all day   3.   Patient did not answer   Step 2: Internal coping strategies - Things I can do to take my mind off of my problems without contacting another person (relaxation technique, physical activity):     1.  Meditation   2.   Art, Drawing, Writing   3.   Talking with other people   Step 3: People and social settings that provide distraction:     1. Name:  My Brother Phone:   Patient did not answer   2. Name:  Patient did not answer Phone:   Patient did not answer   3. Place:   Patient did not answer 4. Place:   Patient did not answer     The one thing that is most important to me and worth living for is:   Kids   Step 4: People whom I can ask for help:     1. Name:   Cely Blaine Phone:   [phone]   2. Name:   Wilman Phone:   [phone]   3. Name: Patient did not answer Phone: Patient did not answer   Step 5: Professionals or agencies I can contact during a crisis:     1. Clinician Name:   Cely Royal Phone:   [phone]   Clinician Pager or Emergency Contact #:  Patient did not answer   2. Clinician Name:   Patient did not answer Phone:   Patient did not answer   Clinician Pager or Emergency Contact #:   Patient did not answer   3. Local Urgent Care Services:   Patient did not answer  Urgent Care Services Address:   Patient did not answer  Urgent Care Services Phone:   Patient did not answer   4. Suicide Prevention Lifeline Phone: [phone] ([phone])     Step 6: Making the environment safe:     1.   Stay away from my Brother   2.   Patient did not answer   Safety Plan Template 2008 Lisa Leos and Rik Moss is reprinted with the express permission of the authors.  No portion of the  Safety Plan Template may be reproduced without the express, written permission.  You can contact the authors at [email or web address] or [email or web address].

## 2018-07-12 NOTE — PROGRESS NOTES
Name: Uri Valladares  Date: 7/11/2018  Medical Record: [id number]    Envelope Number: [id number]    List of Contents (List each item separately in new row):   1 social security  2 certificate of birth    Admission:  I am responsible for any personal items that are not sent to the safe or pharmacy.  [facility] is not responsible for loss, theft or damage of any property in my possession.      Patient Signature:  ___________________________________________       Date/Time:__________________________    Staff Signature: __________________________________       Date/Time:__________________________    2nd Staff person, if patient is unable/unwilling to sign:      __________________________________________________________       Date/Time: __________________________      Discharge:  [facility] has returned all of my personal belongings:    Patient Signature: ________________________________________     Date/Time: ____________________________________    Staff Signature: ______________________________________     Date/Time:_____________________________________

## 2018-07-13 ENCOUNTER — HOSPITAL ENCOUNTER (OUTPATIENT)
Dept: BEHAVIORAL HEALTH | Facility: CLINIC | Age: 28
End: 2018-07-13
Attending: FAMILY MEDICINE
Payer: COMMERCIAL

## 2018-07-13 PROCEDURE — H2035 A/D TX PROGRAM, PER HOUR: HCPCS

## 2018-07-13 PROCEDURE — H2035 A/D TX PROGRAM, PER HOUR: HCPCS | Mod: HQ

## 2018-07-13 PROCEDURE — 10020000 ZZH LODGING PLUS FACILITY CHARGE ADULT

## 2018-07-14 ENCOUNTER — HOSPITAL ENCOUNTER (OUTPATIENT)
Dept: BEHAVIORAL HEALTH | Facility: CLINIC | Age: 28
End: 2018-07-14
Attending: FAMILY MEDICINE
Payer: COMMERCIAL

## 2018-07-14 PROCEDURE — 10020000 ZZH LODGING PLUS FACILITY CHARGE ADULT

## 2018-07-14 PROCEDURE — H2035 A/D TX PROGRAM, PER HOUR: HCPCS

## 2018-07-15 ENCOUNTER — HOSPITAL ENCOUNTER (OUTPATIENT)
Dept: BEHAVIORAL HEALTH | Facility: CLINIC | Age: 28
End: 2018-07-15
Attending: FAMILY MEDICINE
Payer: COMMERCIAL

## 2018-07-15 PROCEDURE — H2035 A/D TX PROGRAM, PER HOUR: HCPCS

## 2018-07-15 PROCEDURE — 10020000 ZZH LODGING PLUS FACILITY CHARGE ADULT

## 2018-07-16 ENCOUNTER — HOSPITAL ENCOUNTER (OUTPATIENT)
Dept: BEHAVIORAL HEALTH | Facility: CLINIC | Age: 28
End: 2018-07-16
Attending: FAMILY MEDICINE
Payer: COMMERCIAL

## 2018-07-16 PROCEDURE — 10020000 ZZH LODGING PLUS FACILITY CHARGE ADULT

## 2018-07-16 PROCEDURE — H2035 A/D TX PROGRAM, PER HOUR: HCPCS

## 2018-07-16 PROCEDURE — H2035 A/D TX PROGRAM, PER HOUR: HCPCS | Mod: HQ
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
## 2018-07-17 ENCOUNTER — HOSPITAL ENCOUNTER (OUTPATIENT)
Dept: BEHAVIORAL HEALTH | Facility: CLINIC | Age: 28
End: 2018-07-17
Attending: FAMILY MEDICINE
Payer: COMMERCIAL

## 2018-07-17 PROCEDURE — 10020000 ZZH LODGING PLUS FACILITY CHARGE ADULT

## 2018-07-17 PROCEDURE — H2035 A/D TX PROGRAM, PER HOUR: HCPCS

## 2018-07-17 PROCEDURE — H2035 A/D TX PROGRAM, PER HOUR: HCPCS | Mod: HQ

## 2018-07-18 ENCOUNTER — HOSPITAL ENCOUNTER (OUTPATIENT)
Dept: BEHAVIORAL HEALTH | Facility: CLINIC | Age: 28
End: 2018-07-18
Attending: FAMILY MEDICINE
Payer: COMMERCIAL

## 2018-07-18 PROCEDURE — H2035 A/D TX PROGRAM, PER HOUR: HCPCS | Mod: HQ

## 2018-07-18 PROCEDURE — 10020000 ZZH LODGING PLUS FACILITY CHARGE ADULT

## 2018-07-19 ENCOUNTER — OFFICE VISIT (OUTPATIENT)
Dept: ADDICTION MEDICINE | Facility: CLINIC | Age: 28
End: 2018-07-19
Payer: COMMERCIAL

## 2018-07-19 ENCOUNTER — HOSPITAL ENCOUNTER (OUTPATIENT)
Dept: BEHAVIORAL HEALTH | Facility: CLINIC | Age: 28
End: 2018-07-19
Attending: FAMILY MEDICINE
Payer: COMMERCIAL

## 2018-07-19 VITALS
HEART RATE: 93 BPM | BODY MASS INDEX: 21.31 KG/M2 | TEMPERATURE: 97.9 F | RESPIRATION RATE: 14 BRPM | OXYGEN SATURATION: 99 % | SYSTOLIC BLOOD PRESSURE: 122 MMHG | DIASTOLIC BLOOD PRESSURE: 78 MMHG | WEIGHT: 166 LBS

## 2018-07-19 DIAGNOSIS — B36.0 TINEA VERSICOLOR DUE TO MALASSEZIA FURFUR: ICD-10-CM

## 2018-07-19 DIAGNOSIS — F33.9 EPISODE OF RECURRENT MAJOR DEPRESSIVE DISORDER, UNSPECIFIED DEPRESSION EPISODE SEVERITY (H): ICD-10-CM

## 2018-07-19 DIAGNOSIS — F11.93 OPIOID WITHDRAWAL (H): Primary | ICD-10-CM

## 2018-07-19 DIAGNOSIS — R23.8 SKIN IRRITATION: ICD-10-CM

## 2018-07-19 DIAGNOSIS — Z87.891 FORMER SMOKER: ICD-10-CM

## 2018-07-19 LAB
AMPHETAMINES UR QL: NOT DETECTED NG/ML
BARBITURATES UR QL SCN: NOT DETECTED NG/ML
BENZODIAZ UR QL SCN: NOT DETECTED NG/ML
BUPRENORPHINE UR QL: ABNORMAL NG/ML
CANNABINOIDS UR QL: ABNORMAL NG/ML
COCAINE UR QL SCN: NOT DETECTED NG/ML
D-METHAMPHET UR QL: NOT DETECTED NG/ML
METHADONE UR QL SCN: NOT DETECTED NG/ML
OPIATES UR QL SCN: NOT DETECTED NG/ML
OXYCODONE UR QL SCN: NOT DETECTED NG/ML
PCP UR QL SCN: NOT DETECTED NG/ML
PROPOXYPH UR QL: NOT DETECTED NG/ML
TRICYCLICS UR QL SCN: NOT DETECTED NG/ML

## 2018-07-19 PROCEDURE — 80306 DRUG TEST PRSMV INSTRMNT: CPT | Performed by: PEDIATRICS

## 2018-07-19 PROCEDURE — H2035 A/D TX PROGRAM, PER HOUR: HCPCS

## 2018-07-19 PROCEDURE — H2035 A/D TX PROGRAM, PER HOUR: HCPCS | Mod: HQ

## 2018-07-19 PROCEDURE — 10020000 ZZH LODGING PLUS FACILITY CHARGE ADULT

## 2018-07-19 PROCEDURE — 99205 OFFICE O/P NEW HI 60 MIN: CPT | Performed by: PEDIATRICS

## 2018-07-19 RX ORDER — BUPRENORPHINE HYDROCHLORIDE AND NALOXONE HYDROCHLORIDE DIHYDRATE 8; 2 MG/1; MG/1
TABLET SUBLINGUAL
Qty: 14 EACH | Refills: 0 | Status: SHIPPED | OUTPATIENT
Start: 2018-07-19 | End: 2018-07-26

## 2018-07-19 RX ORDER — KETOCONAZOLE 20 MG/ML
SHAMPOO TOPICAL
Qty: 120 ML | Refills: 1 | Status: SHIPPED | OUTPATIENT
Start: 2018-07-19 | End: 2019-09-18

## 2018-07-19 RX ORDER — KETOCONAZOLE 20 MG/G
CREAM TOPICAL 2 TIMES DAILY
Qty: 60 G | Refills: 1 | Status: SHIPPED | OUTPATIENT
Start: 2018-07-19

## 2018-07-19 RX ORDER — MUPIROCIN 20 MG/G
OINTMENT TOPICAL
Qty: 22 G | Refills: 1 | Status: SHIPPED | OUTPATIENT
Start: 2018-07-19

## 2018-07-19 NOTE — MR AVS SNAPSHOT
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        Former smoker           Follow-ups after your visit        Your next 10 appointments already scheduled     Jul 20, 2018  7:15 AM CDT   Treatment with ADULT LODGING PLUS A   [facility] ([facility])    [address] [place] [address]   [phone]            Jul 21, 2018  7:15 AM CDT   Treatment with ADULT LODGING PLUS A   [facility] ([facility])    [address] [place] [address]   [phone]            Jul 22, 2018  7:15 AM CDT   Treatment with ADULT LODGING PLUS A   [facility] ([facility])    [address] [place] [address]   [phone]            Jul 23, 2018  7:15 AM CDT   Treatment with ADULT LODGING PLUS A   [facility] ([facility])    [address] [place] [address]   [phone]            Jul 24, 2018  7:15 AM CDT   Treatment with ADULT LODGING PLUS A   [facility] ([facility])    [address] [place] [address]   [phone]            Jul 25, 2018  7:15 AM CDT   Treatment with ADULT LODGING PLUS A   [facility] ([facility])    [facility] [address] [address]   [phone]            Jul 26, 2018  7:15 AM CDT   Treatment with ADULT LODGING PLUS A   [facility] ([facility])    [address] [place] [address]   [phone]            Jul 26, 2018  8:40 AM CDT   Return Visit with Sera Bardales MD   [facility] ([facility])    [address]  [address]  [facility] [address]   [phone]            Jul 27, 2018  7:15 AM CDT   Treatment with ADULT LODGING PLUS A   [facility] ([facility])    [address] [place] [address]   [phone]            Jul 28, 2018  7:15 AM CDT   Treatment with ADULT LODGING PLUS A   [facility] ([facility])    [address] [place] [address]   [phone]              Future tests that were ordered for you today     Open Standing Orders        Priority Remaining Interval Expires Ordered    Urine Drugs of Abuse Screen Panel 13 Routine 99/100  7/16/2019 7/16/2018            Who to contact     If you have questions or need follow up information about today's clinic visit or your schedule please contact [facility] directly at [phone].  Normal or non-critical lab and imaging results will be communicated to you by MyChart, letter or phone within 4 business days after the clinic has received the results. If you do not hear from us within 7 days, please contact the clinic through MyChart or phone. If you have a critical or abnormal lab result, we will notify you by phone as soon as possible.  Submit refill requests through [email or web address] or call your pharmacy and they will forward the refill request to us. Please allow 3 business days for your refill to be  "completed.          Additional Information About Your Visit        [employer] Information     [employer] lets you send messages to your doctor, view your test results, renew your prescriptions, schedule appointments and more. To sign up, go to [email or web address] . Click on \"Log in\" on the left side of the screen, which will take you to the Welcome page. Then click on \"Sign up Now\" on the right side of the page.     You will be asked to enter the access code listed below, as well as some personal information. Please follow the directions to create your username and password.     Your access code is: [email or web address]  Expires: 10/8/2018  9:51 AM     Your access code will  in 90 days. If you need help or a new code, please call your [place] clinic or [phone].        Care EveryWhere ID     This is your Care EveryWhere ID. This could be used by other organizations to access your [place] medical records  [id number]        Your Vitals Were     Pulse Temperature Respirations Pulse Oximetry BMI (Body Mass Index)       93 97.9  F (36.6  C) (Oral) 14 99% 21.31 kg/m2        Blood Pressure from Last 3 Encounters:   18 122/78   07/10/18 129/71   17 134/86    Weight from Last 3 Encounters:   18 166 lb (75.3 kg)   18 164 lb (74.4 kg)   17 167 lb (75.8 kg)              We Performed the Following     Urine Drugs of Abuse Screen Panel 13          Today's Medication Changes          These changes are accurate as of 18 12:55 PM.  If you have any questions, ask your nurse or doctor.               Start taking these medicines.        Dose/Directions    buprenorphine-naloxone 8-2 MG Subl sublingual tablet   Commonly known as:  SUBOXONE   Used for:  Opioid withdrawal (H)   Started by:  Sera Bardales MD        One tab daily am and 1/2 tab daily q pm.   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   Quantity:  120 mL   Refills:  1       mupirocin 2 % ointment   Commonly known as:  BACTROBAN   Used for:  Skin irritation   Started by:  Sera Bardales MD        Place in nose 2 x day until sx resolve   Quantity:  22 g   Refills:  1       * Notice:  This list has 2 medication(s) that are the same as other medications prescribed for you. Read the directions carefully, and ask your doctor or other care provider to review them with you.         Where to get your medicines      These medications were sent to [facility] [address]  [address] [place] [address], [facility] [address]     Phone:  [phone]     ketoconazole 2 % cream    ketoconazole 2 % shampoo    mupirocin 2 % ointment         Some of these will need a paper prescription and others can be bought over the counter.  Ask your nurse if you have questions.     Bring a paper prescription for each of these medications     buprenorphine-naloxone 8-2 MG Subl sublingual tablet                Primary Care Provider Fax #    Physician No Ref-Primary [phone]       No address on file        Equal Access to Services     NEAL GARCIA AH: Hadii cesar michelle hadronako Sogeorgetteali, waaxda luqadaha, qaybta kaalmada adeegyada, bear salgado. So [facility] [phone].    ATENCIÓN: Si habla español, tiene a dutta disposición servicios gratuitos de asistencia lingüística. Llame al [phone].    We comply with applicable federal civil rights laws and [place] laws. We do not discriminate on the basis of race, color, national origin, age, disability, sex, sexual orientation, or  gender identity.            Thank you!     Thank you for choosing [facility]  for your care. Our goal is always to provide you with excellent care. Hearing back from our patients is one way we can continue to improve our services. Please take a few minutes to complete the written survey that you may receive in the mail after your visit with us. Thank you!             Your Updated Medication List - Protect others around you: Learn how to safely use, store and throw away your medicines at www.disposemymeds.org.          This list is accurate as of 7/19/18 12:55 PM.  Always use your most recent med list.                   Brand Name Dispense Instructions for use Diagnosis    alum & mag hydroxide-simethicone 200-200-20 MG/5ML Susp suspension    MYLANTA/MAALOX     Take 30 mLs by mouth every 6 hours as needed for indigestion        buprenorphine-naloxone 8-2 MG Subl sublingual tablet    SUBOXONE    14 each    One tab daily am and 1/2 tab daily q pm.    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       loratadine 10 MG tablet    CLARITIN     Take 10 mg by mouth daily as needed for allergies        MELATONIN PO      Take 3 mg by mouth nightly as needed        mupirocin 2 % ointment    BACTROBAN    22 g    Place in nose 2 x day until sx resolve    Skin irritation       phenol-menthol 14.5 MG lozenge      Place 1 lozenge inside cheek every 2 hours as needed for moderate pain        senna-docusate 8.6-50 MG per tablet     SENOKOT-S;PERICOLACE     Take 2 tablets by mouth daily as needed for constipation        traZODone 50 MG tablet    DESYREL    30 tablet    Take 1 tablet (50 mg) by mouth nightly as needed for sleep    Mood disorder (H)       TYLENOL PO      Take 325-650 mg by mouth every 4 hours as needed for mild pain or fever        * Notice:  This list has 2 medication(s) that are the same as other medications prescribed for you. Read the directions carefully, and ask your doctor or other care provider to review them with you.

## 2018-07-19 NOTE — PROGRESS NOTES
"    SUBJECTIVE:                                                        Uri Valladares is a 27 year old male who presents for  initial visit for addiction consultation and management referred by Dr. Parrish      [place] Board of Pharmacy Data Base Reviewed:   Yes 7/10/18 Suboxone 4mg film #16    HPI:   Patient seen today for follow up of Suboxone  MAT.  Self admitted to Detox 7/7/18-7/10/18  Using opioid off and on for 10 yr.   More recently tolerance increasing to the poin of Percocet/Fentanyl up to 3 tab /day.  (30mg). Chart review shows using Fentanyl up to 90-100mcg/day snorting.  No IV use.   Withdrawal when running out.    Discharged Suboxone 4mg bid.  Currently lodging plus.         CD History:     DRUG OF CHOICE - Opioid   LAST USE:  7/6/18        LONGEST PERIOD OF SOBRIETY-none  2 mo opioid. Was using other substances during that time.     PREVIOUS DETOX/TREATMENT PROGRAMS-none    HISTORY OF OVERDOSE-none    PREVIOUS MEDICATION ASSISTED TREATMENT:  none      Other Substances:    ALCOHOL-daily \"moderate\" 2-4 shots/day  Was starting to get some withdrawal   MARIJUANA-on /off  Heavy use when using.   PRESCRIPTION STIMULANTS- none  COCAINE/CRACK-cocaine occaisonally  METH/AMPHETAMINES-none  OPIATES-above  BENZODIAZEPINES-none  HALLUCINOGENS - none      NICOTINE-smoking PPD up until recent quitting in detox.        Hepatitis C Status  Patient reports previous neg history.     PAST PSYCHIATRIC HISTORY   Hx of depression on /off.  2 x suicide attempts  Hospitalized x 2.   No current medications.        Patient Active Problem List    Diagnosis Date Noted     Chemical dependency (H) 07/10/2018     Priority: Medium     Opioid withdrawal (H) 07/07/2018     Priority: Medium     Suicidal ideation 05/15/2017     Priority: Medium       Problem list and histories reviewed & adjusted, as indicated.  Additional history: as documented     Past Medical History:   Diagnosis Date     Depressive disorder        No past surgical " history on file.      Family History   Problem Relation Age of Onset     Substance Abuse Mother      Depression Mother    mother 28 yr sober.       Social History     Social History Narrative    Most recently living in car and some with mother.   Court recent for disorderly conduct and Hx of DUI few months ago (just need to take class to clear).          3 children  (8yr, 5yr and 2yr.)  Youngest lives here.  Two oldest live in [facility].             Current Outpatient Prescriptions   Medication Sig Dispense Refill     Acetaminophen (TYLENOL PO) Take 325-650 mg by mouth every 4 hours as needed for mild pain or fever       alum & mag hydroxide-simethicone (MYLANTA/MAALOX) 200-200-20 MG/5ML SUSP suspension Take 30 mLs by mouth every 6 hours as needed for indigestion       guaiFENesin (ROBITUSSIN) 20 mg/mL SOLN solution Take 10 mLs by mouth every 4 hours as needed for cough       IBUPROFEN PO Take 200-400 mg by mouth every 6 hours as needed for moderate pain       loratadine (CLARITIN) 10 MG tablet Take 10 mg by mouth daily as needed for allergies       MELATONIN PO Take 3 mg by mouth nightly as needed       phenol-menthol (CEPASTAT) 14.5 MG lozenge Place 1 lozenge inside cheek every 2 hours as needed for moderate pain       senna-docusate (SENOKOT-S;PERICOLACE) 8.6-50 MG per tablet Take 2 tablets by mouth daily as needed for constipation       traZODone (DESYREL) 50 MG tablet Take 1 tablet (50 mg) by mouth nightly as needed for sleep 30 tablet 1         No Known Allergies        REVIEW OF SYSTEMS:    General: No acute withdrawal symptoms.  No recent infections or fever  Eyes: Negative for vision changes or eye problems  ENT: No problems with ears, or throat.  No difficulty swallowing.  Is having tenderness in nares especially left with some crusting.  Hx of snorting.   Resp: No coughing, wheezing or shortness of breath  CV: No chest pains or palpitations  GI: No nausea, vomiting, abdominal pain, diarrhea,  constipation  : No urinary frequency or dysuria.    Musculoskeletal: No significant muscle or joint pains, No edema  Neurologic: No numbness, tingling, weakness, problems with balance or coordination  Psychiatric: some anxiety  Skin: hx of tinea versicolor on neck.  Request topical.         OBJECTIVE:                                                      EXAM    Blood pressure 122/78, pulse 93, temperature 97.9  F (36.6  C), temperature source Oral, resp. rate 14, weight 166 lb (75.3 kg), SpO2 99 %.    GENERAL APPEARANCE: alert, comfortable appearing  EYES: Eyes grossly normal to inspection  HENT: TM's normal, mouth without ulcers or lesions, nose no rhinorrhea but small erytheamtous crusted areas inside left nares.   NECK: no adenopathy, thyromegaly or masses  RESP: lungs clear to auscultation - no rales, rhonchi or wheezes and no resp distress  CV: regular rates and rhythm, normal S1 S2,no murmur   ABDOMEN: soft, nontender, without hepatosplenomegaly or masses  MS: extremities normal- no gross deformities noted, gait normal, peripheral pulses normal and no edema  SKIN: no jaundice, no obvious masses.  Scattered area on lateral and posterior neck of hyperpigmentation c/w Tinea Versicolor.   NEURO: Normal strength and tone, sensory exam grossly normal, no tremor  MENTAL STATUS EXAM:  Appearance/Behavior:No appearant distress  Speech: Normal  Mood/Affect: normal affect  Insight: Adequate        Results for orders placed or performed in visit on 07/19/18   Urine Drugs of Abuse Screen Panel 13   Result Value Ref Range    Cannabinoids (66-ubs-1-carboxy-9-THC) Detected, Abnormal Result (A) NDET^Not Detected ng/mL    Phencyclidine (Phencyclidine) Not Detected NDET^Not Detected ng/mL    Cocaine (Benzoylecgonine) Not Detected NDET^Not Detected ng/mL    Methamphetamine (d-Methamphetamine) Not Detected NDET^Not Detected ng/mL    Opiates (Morphine) Not Detected NDET^Not Detected ng/mL    Amphetamine (d-Amphetamine) Not  Detected NDET^Not Detected ng/mL    Benzodiazepines (Nordiazepam) Not Detected NDET^Not Detected ng/mL    Tricyclic Antidepressants (Desipramine) Not Detected NDET^Not Detected ng/mL    Methadone (Methadone) Not Detected NDET^Not Detected ng/mL    Barbiturates (Butalbital) Not Detected NDET^Not Detected ng/mL    Oxycodone (Oxycodone) Not Detected NDET^Not Detected ng/mL    Propoxyphene (Norpropoxyphene) Not Detected NDET^Not Detected ng/mL    Buprenorphine (Buprenorphine) Detected, Abnormal Result (A) NDET^Not Detected ng/mL                        ASSESSMENT/PLAN:  (F11.23) Opioid withdrawal (H)  (primary encounter diagnosis)    Plan: buprenorphine-naloxone (SUBOXONE) 8-2 MG SUBL         sublingual tablet        Incresae to 8mg q am and 4mg q pm.   Follow up 2 wk.   Continue lodging plus.     Suboxone risk/benefit/side effect and intended purposes reviewed.      Opioid warning reviewed.  Risk of overdose following a period of abstinence due to decrease tolerance was discussed including risk of death.   Risk of overdose if using Suboxone with other substances particuarly benzodiazepines/alcohol was reviewed.     Counseled the patient on the importance of having a recovery program in addition to Medication assisted treatment.  Components include having some type of sober network, avoiding isolating, having willingness  to change, avoiding triggers and managing cravings.    Encouraged other services such as counseling, 12 step or other self-help organizations.      Strongly recommended abstain from alcohol, benzodiazepines, THC, opioids and other drugs of abuse.  Increased risk of relapse for opioids with use of these substances discussed.  Increased risk of overdose/death with use of other substances particularly benzodiazepines/alcohol reviewed.    Refer to higher level of services as needed    Naloxone offered.  Patient has RX.     Patient advised of office protocols for refills/appointments.        (F33.9) Episode  of recurrent major depressive disorder, unspecified depression episode severity (H)  Plan: will need follow up PCP. Monitor for need for  medications.   Encouraged mental health counseling      (R23.8) Skin irritation  Comment: nares  Plan: mupirocin (BACTROBAN) 2 % ointment        Bid until sx improve.     (B36.0) 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  Plan: ketoconazole (NIZORAL) 2 % cream, ketoconazole         (NIZORAL) 2 % shampoo        Follow up with PCP    (Z87.891) Former smoker  Plan: ecourage abstinence.           ENCOUNTER FOR LONG TERM USE OF HIGH RISK MEDICATION   High Risk Drug Monitoring?  YES   Drug being monitored: Buprenorphine   Reason for drug: Opioid Use Disorder   What is being monitored?: Dosage, Cravings, Triggers and side effects         Sera Bardales MD  [facility]  [phone]

## 2018-07-20 ENCOUNTER — HOSPITAL ENCOUNTER (OUTPATIENT)
Dept: BEHAVIORAL HEALTH | Facility: CLINIC | Age: 28
End: 2018-07-20
Attending: FAMILY MEDICINE
Payer: COMMERCIAL

## 2018-07-20 PROCEDURE — H2035 A/D TX PROGRAM, PER HOUR: HCPCS | Mod: HQ

## 2018-07-20 PROCEDURE — 10020000 ZZH LODGING PLUS FACILITY CHARGE ADULT

## 2018-07-20 PROCEDURE — H2035 A/D TX PROGRAM, PER HOUR: HCPCS

## 2018-07-20 NOTE — PROGRESS NOTES
"D: Patient presented assignment in group \" Drug Use History\".   Patient outlined his substance use history from a young age. Patient also shared how his substance use has interfered with having a relationship with his children.  Patient did not complete the second portion of the drug use history that included values violated and emotional consequences.     Patient appeared resistant to feedback offered by his peers. Patient appeared to become angry during presentation. Patient did not seem to participate with group after presentation.     I: Counseling intern facilitated group.    A: Patient appears to minimize his substance use in addition to the consequences of his use.  Patient appeared resistant to feedback offered by his peers and counseling inter.  Patient presented as confrontational and defensive throughout his presentation.      P: [facility] counseling staff to monitor patients progress and patient to continue with treatment plan goal.      Kody Walker- Counseling intern                   "

## 2018-07-20 NOTE — PROGRESS NOTES
Patient:  Uri Valladares            Adult CD Progress Note and Treatment Plan Review     Attendance  Please refer to OP BEH CD Adult Attendance Record Documentation Flowsheet    Support group attended this week: yes    Reporting sobriety:  yes    Treatment Plan     Treatment Plan Review competed on: 7/20/2018       Client preferred learning style: Hands on  Demonstration    Staff Members contributing Basilia Urrutia, [facility], Ricci Mack, [facility], Kody Walker, Counseling intern                     Received Supervision: Yes    Client: Is working to increase motivation to work towards completing assignments in treatment plan..    Client received copy of plan/revised plan: Yes    Client agrees with plan/revised plan: Yes        Changes to Treatment Plan: No    New Goals added since last review No additional goals added at this time.    Goals worked on since last review Patient completed his initial assignment 'drug progression' and presented in group processes.      Strategies effective: yes    Strategies need these changes: No additional strategies needed at this time.    1) Care Coordination Activities:  Patient is currently seeking sober housing options affiliated with [facility].  2) Medical, Mental Health and other appointments the client attended: Patient met with [facility] staff mental health therapist and attended a scheduled medical appointment.  3) Medication issues: No issues to address at this time.  4) Physical and mental health problems: No issues to address at this time.  5) Any changes in Vulnerable Adult Status?  No If yes, add to treatment plan and individual abuse prevention plan.  6) Review and evaluation of the individual abuse prevention plan: Current IAPP for this program is adequate for this client          ASAM Risk Rating:    Dimension 1 1 Patient reports a last use date of 7/7/2018.  Patient is prescribed a Suboxone maintence program.  No issues to address at this time.     Dimension 2 0   "Patient denies any biomedical concerns that would interfere with full participation in group and treatment processes.  Patient attended scheduled medical appointment.  Patient reports having an established mental health therapist in the community.  Patient attended weekly nursing lecture on 7/22/2018.  Patient is covered under health insurance and reports he is able to navigate the health care system independenly.      Dimension 3 2 Patient reprots a formal mental health diagnosis of depression.  Patient denies any suicide and/or self harm ideaton at this time.  Patient did compelte a safety plan.  Patient met with [facility] mental health therapist for an individual session, reporting he will continue to do so while in programming.  Patient shares his emotions in group and reports journalling daily.  Patient does have a guided daily goal journal assigment to complete daily and is inconsistant with daily completion.  Patient is working to identify his behavorial patterns, including the defenses he uses and take accountability for his actions.  Patient attended  led spirituality group on 7/18/2018.    Dimension 4  2 Patient presented his initial assignment \"Drug Progression\".  Patient did not complete second portion of assignment which includes values violated and emotional consequences.  At this time, patient appears to be minimally engaged in group and treatment processes.       Dimension 5 4 Patient is working to understand the relationship between his unmanaged emotional health, mental health and his continued substance use at this time.  Patient has begun to work with [facility]  to establish aftercare plan of outpatient as a continuum of care.  Patient appears to be at a high risk for continued substance use at this time.      Dimension 6 4 Patient reports he is currently homeless and unemployed.  Patient is working to gain sober housing affiliated [facility] treatment program for after " completion of [facility].  Patient attended relationships workshop on 7/14/2018.  Patient lacks a strong sober network at this time.      Guide to Risk Ratings for Suicidality:   IDEATION: Active thoughts of suicide? INTENT: Intent to follow on suicide? PLAN: Plan to follow through on suicide? Level of Risk:   IF Yes Yes Yes Patient = High Emergent   IF Yes Yes No Patient = High Urgent/Non-Emergent   IF Yes No No Patient = Moderate Non-Urgent   IF No No   No Patient = Low Risk   The patient's ADDITIONAL RISK FACTORS and lack of PROTECTIVE FACTORS may increase their overall suicide risk ratings.     Patient's/client's current risk rating:  Low Risk    Family Involvement:   none schedule this week    Data:   offered feedback      Intervention:   Behavior modification  Counselor feedback  Education  Emotional management  Group feedback  Mental health education      Assessment:   Stages of Change Model  Preparation/Determination    Appears/Sounds:  Cooperative with redirection.    Plan:  Monitor emotional/physical health  Continue to work towards treatment plan goals.    GAVIOTA Simms

## 2018-07-21 ENCOUNTER — HOSPITAL ENCOUNTER (OUTPATIENT)
Dept: BEHAVIORAL HEALTH | Facility: CLINIC | Age: 28
End: 2018-07-21
Attending: FAMILY MEDICINE
Payer: COMMERCIAL

## 2018-07-21 PROCEDURE — 10020000 ZZH LODGING PLUS FACILITY CHARGE ADULT

## 2018-07-21 PROCEDURE — H2035 A/D TX PROGRAM, PER HOUR: HCPCS

## 2018-07-22 ENCOUNTER — HOSPITAL ENCOUNTER (OUTPATIENT)
Dept: BEHAVIORAL HEALTH | Facility: CLINIC | Age: 28
End: 2018-07-22
Attending: FAMILY MEDICINE
Payer: COMMERCIAL

## 2018-07-22 PROCEDURE — H2035 A/D TX PROGRAM, PER HOUR: HCPCS | Mod: HQ

## 2018-07-22 PROCEDURE — 10020000 ZZH LODGING PLUS FACILITY CHARGE ADULT

## 2018-07-23 ENCOUNTER — HOSPITAL ENCOUNTER (OUTPATIENT)
Dept: BEHAVIORAL HEALTH | Facility: CLINIC | Age: 28
End: 2018-07-23
Attending: FAMILY MEDICINE
Payer: COMMERCIAL

## 2018-07-23 PROCEDURE — H2035 A/D TX PROGRAM, PER HOUR: HCPCS | Mod: HQ

## 2018-07-23 PROCEDURE — 10020000 ZZH LODGING PLUS FACILITY CHARGE ADULT

## 2018-07-23 PROCEDURE — H2035 A/D TX PROGRAM, PER HOUR: HCPCS

## 2018-07-23 NOTE — PROGRESS NOTES
"This writer and counseling intern, Kody Walker met with patient as patient reported a peer had knocked loudly on his door for wake up call and patient reported feeling disrespected by the nature of the loud knock.  Follow the loud knock, patient admitted to calling the peer names, such as \"b\" word and saying anti-costa derogatory slang terms as well.  Peer confirmed and patient admitted to both this writer and clinical supervisor, Betty De La Paz, of his behavior.  This writer met with patient to inform him any derogatory name calling, including racist or otherwise remarks would be grounds for immediate discharge and will not be tolerated in this program.  Patient verbalized his understanding.  Patient appeared to take accountability for his behavior.    Also discussed with patient is progress towards his treatment plan goals.  Patient reported he is trying to take in what he is learning  but feels he doesn't care for the treatment plan and doesn't like the assignments.  He also denied he has begun to seek sober housing as he may move to [place] following completion of [facility].  This writer explained having back up plans such as gaining sober housing.  This writer did explain to patient he is responsible for gaining placement in a sober house and [facility] counselors set up additional CD treatment.      Patient reported he would work to maintain focus on himself and make contact with sober houses.    [facility] counseling staff to continue to monitor patients behavior.            "

## 2018-07-24 ENCOUNTER — HOSPITAL ENCOUNTER (OUTPATIENT)
Dept: BEHAVIORAL HEALTH | Facility: CLINIC | Age: 28
End: 2018-07-24
Attending: FAMILY MEDICINE
Payer: COMMERCIAL

## 2018-07-24 PROCEDURE — 10020000 ZZH LODGING PLUS FACILITY CHARGE ADULT

## 2018-07-24 PROCEDURE — H2035 A/D TX PROGRAM, PER HOUR: HCPCS

## 2018-07-24 NOTE — PROGRESS NOTES
AFTERCARE PLANNING NOTE  7/24/18    Per patient's primary counselor's request, writer called the [facility] residential program inquiring about a possible start date for pt.  Bryce of [facility] stated that they wouldn't have a bed until late August. I explained to her that the patient can transition from [facility] to [facility] immediately and to let me know if any beds become available sooner. Bryce stated she will keep this in mind. I faxed pt's clinicals to [facility] today, per pt's counselor's request.    Gonzalo Emery/ [facility]   [facility]

## 2018-07-24 NOTE — PROGRESS NOTES
"INDIVIDUAL SESSION SUMMARY    D) Met with client on 7/24/18 from 3:00-4:00. Client spoke about some new insights he had this week about the influence of \"negative people\" in his life including family and his peers in treatment. Client spoke of having a growing understanding and compassion for how his peers are struggling and how that contributes to what he describes as their \"negativity\". Client spoke about a recent incident where he felt he was being taunted by a peer and after initially responding calmly, the client made a \"hankins\" verbal comment to his peer. Therapist and client discussed how it is not justifiable to say rude or hankins comments to peers even when provoked; that this is not how the client wants to handle conflict. Client spoke about the benefits of meditation and how a daily practice has been helping him have a better understanding of what he should be learning from this experience in treatment. Client stated that going forward he believes he will not do drugs and that he will find a way to be helping others with this addiction. Client stated that he would like referrals to continue with individual therapy but he is still undecided if he will stay in [place] or return to [place] where his mom is located.    I) Individual session with client. Provided client with verbal interventions including: validation, compassion and support. Therapist encouraged client to increase self-care activities including: meditation and exercise.     A) Client appears to have experienced early attachment disruption with all his caregivers, resulting in lack of trust and fear of abandonment. Client appears to be sensing and intuitive, but to ignore his intuition and stays in relationships that are co-dependent. Client appears emotionally constricted and to lack skills for emotional regulation and stress management. Client appears to lack a sober support network. Client appears to be practicing a new meditation skill to help with his " emotional regulation.     P) Next session is scheduled for 7/30/18.   Christi Leon, AMBER  7/24/2018

## 2018-07-24 NOTE — PROGRESS NOTES
This writer met with patient as patient was 15 minutes late to AM small group session.  This writer explained to patient the expectations of this program and he would need to abide by all rules including attending all programming on time.  Patient verbalized he would adhere to program rules moving forward.  This writer did share with patient any further non complaint behavior of program rules could result in a discharge from programming.  This writer followed up with patient regarding sober housing and patient denied making any calls for sober housing.  This writer did share alternative residential programs as a step down, patient signed release of information for [facility].      MELLO Simms

## 2018-07-25 ENCOUNTER — HOSPITAL ENCOUNTER (OUTPATIENT)
Dept: BEHAVIORAL HEALTH | Facility: CLINIC | Age: 28
End: 2018-07-25
Attending: FAMILY MEDICINE
Payer: COMMERCIAL

## 2018-07-25 PROCEDURE — H2035 A/D TX PROGRAM, PER HOUR: HCPCS | Mod: HQ

## 2018-07-25 PROCEDURE — 10020000 ZZH LODGING PLUS FACILITY CHARGE ADULT

## 2018-07-25 NOTE — PROGRESS NOTES
Name: Uri Valladares  Date: 7/24/2018  Medical Record: [id number]    Envelope Number: [id number]    List of Contents (List each item separately in new row):   1 cellphone   1  and cable    Admission:  I am responsible for any personal items that are not sent to the safe or pharmacy.  [facility] is not responsible for loss, theft or damage of any property in my possession.      Patient Signature:  ___________________________________________       Date/Time:__________________________    Staff Signature: __________________________________       Date/Time:__________________________    2nd Staff person, if patient is unable/unwilling to sign:      __________________________________________________________       Date/Time: __________________________      Discharge:  [facility] has returned all of my personal belongings:    Patient Signature: ________________________________________     Date/Time: ____________________________________    Staff Signature: ______________________________________     Date/Time:_____________________________________

## 2018-07-25 NOTE — PROGRESS NOTES
Integrative Therapies: Essential Oils    Patient requesting CALM essential oil inhaler to manage mood. Discussed appropriate use of essential oil inhalers and instructed patient not to leave labeled product out on unit.     Patient verbalized and demonstrated understanding of how to use essential oil inhaler correctly and will notify LP RN with any concerns or side effects. Patient agrees not to share their essential oil inhaler with other clients.    Continue to support the patient in safely utilizing integrative therapies as able to manage symptoms during treatment.

## 2018-07-26 ENCOUNTER — HOSPITAL ENCOUNTER (OUTPATIENT)
Dept: BEHAVIORAL HEALTH | Facility: CLINIC | Age: 28
End: 2018-07-26
Attending: FAMILY MEDICINE
Payer: COMMERCIAL

## 2018-07-26 ENCOUNTER — OFFICE VISIT (OUTPATIENT)
Dept: ADDICTION MEDICINE | Facility: CLINIC | Age: 28
End: 2018-07-26
Payer: COMMERCIAL

## 2018-07-26 VITALS
OXYGEN SATURATION: 99 % | HEART RATE: 104 BPM | BODY MASS INDEX: 21.18 KG/M2 | WEIGHT: 165 LBS | RESPIRATION RATE: 18 BRPM | DIASTOLIC BLOOD PRESSURE: 62 MMHG | SYSTOLIC BLOOD PRESSURE: 118 MMHG

## 2018-07-26 DIAGNOSIS — Z79.899 HIGH RISK MEDICATION USE: ICD-10-CM

## 2018-07-26 DIAGNOSIS — F33.9 EPISODE OF RECURRENT MAJOR DEPRESSIVE DISORDER, UNSPECIFIED DEPRESSION EPISODE SEVERITY (H): ICD-10-CM

## 2018-07-26 DIAGNOSIS — Z87.891 FORMER SMOKER: ICD-10-CM

## 2018-07-26 DIAGNOSIS — F11.93 OPIOID WITHDRAWAL (H): Primary | ICD-10-CM

## 2018-07-26 PROCEDURE — 10020000 ZZH LODGING PLUS FACILITY CHARGE ADULT

## 2018-07-26 PROCEDURE — H2035 A/D TX PROGRAM, PER HOUR: HCPCS

## 2018-07-26 PROCEDURE — 80306 DRUG TEST PRSMV INSTRMNT: CPT | Performed by: PEDIATRICS

## 2018-07-26 PROCEDURE — 99214 OFFICE O/P EST MOD 30 MIN: CPT | Performed by: PEDIATRICS

## 2018-07-26 PROCEDURE — H2035 A/D TX PROGRAM, PER HOUR: HCPCS | Mod: HQ

## 2018-07-26 RX ORDER — BUPRENORPHINE HYDROCHLORIDE AND NALOXONE HYDROCHLORIDE DIHYDRATE 8; 2 MG/1; MG/1
TABLET SUBLINGUAL
Qty: 28 EACH | Refills: 0 | Status: SHIPPED | OUTPATIENT
Start: 2018-07-26 | End: 2018-08-09

## 2018-07-26 NOTE — PROGRESS NOTES
Name: Uri Valldaares  Date: 7/26/2018  Medical Record: [id number]    Envelope Number: [id number]    List of Contents (List each item separately in new row):   Buprenorphine-Naloxone 8-2 mg tab; 1/2 tab for destruction (4mg)    Admission:  I am responsible for any personal items that are not sent to the safe or pharmacy.  [facility] is not responsible for loss, theft or damage of any property in my possession.      Patient Signature:  ___________________________________________       Date/Time:__________________________    Staff Signature: __________________________________       Date/Time:__________________________    [facility] Staff person, if patient is unable/unwilling to sign:      __________________________________________________________       Date/Time: __________________________      Discharge:  [facility] has returned all of my personal belongings:    Patient Signature: ________________________________________     Date/Time: ____________________________________    Staff Signature: ______________________________________     Date/Time:_____________________________________

## 2018-07-26 NOTE — PROGRESS NOTES
SUBJECTIVE:                                                    OPIOID USE DISORDER - BUPRENORPHINE FOLLOW UP:    Uri Valladares is a 27 year old male who presents to clinic today for follow up of  MAT for opioid use disorder.       Date of last visit:  7/19/2018    [place] Board of Pharmacy Data Base Reviewed:    YES;     Suboxone 7/19/18 8mg tab #14    Brief History:  Initial visit 7/19/18  Detox 7/7/18-7/10/18  Using opioid off and on for 10 yr.   More recently tolerance increasing to the poin of Percocet/Fentanyl  up to 90-100mcg/day snorting.  No IV use.  Moderated hx of alcohol use.  Quit smoking in detox.  Hx of depression.       HPI:      Still on [facility] for another 2 wk.  Then will be going to [facility] /sober living.    Suboxone 8mg q am and 4mg q pm.  Is haiving quite a bit of craving in pm.   enventually would like to wean of Suboxone.    Denies current mental health sx.  Not smoking currently.       Status since last visit: Since last visit patient has been:stable    Intensity:     There has been: moderated pm craving    Suboxone Dose: inadequate    Progression of Symptoms/Precipitating Factors:     Cues to use and relapse triggers:None and craving    Trigger have been:  moderate    Accompanying Signs & Symptoms:    Side Effects: none    Sobriety:     Status: No substance use     Drug Screen: obtained    Alleviating factors/Other Therapies Tried:    Contact with sponsor has been: currently in treatment    Family and support system has been: neutral    Patient has been going to recovery meetings: currently in treatment     Recovery program has been : sporadic    Patient has been participating in professional counseling /therapy: NO    Patient is following with psychiatry: NO    Patient has established PCP: NO        Social History     Social History Narrative    Most recently living in car and some with mother.   Court recent for disorderly conduct and Hx of DUI few months ago (just need to take  class to clear).          3 children  (8yr, 5yr and 2yr.)  Youngest lives here.  Two oldest live in [facility].         Patient Active Problem List    Diagnosis Date Noted     Chemical dependency (H) 07/10/2018     Priority: Medium     Opioid withdrawal (H) 07/07/2018     Priority: Medium     Suicidal ideation 05/15/2017     Priority: Medium       Problem list and histories reviewed & adjusted, as indicated.  Additional history: as documented        Current Outpatient Prescriptions on File Prior to Visit:  Acetaminophen (TYLENOL PO) Take 325-650 mg by mouth every 4 hours as needed for mild pain or fever   alum & mag hydroxide-simethicone (MYLANTA/MAALOX) 200-200-20 MG/5ML SUSP suspension Take 30 mLs by mouth every 6 hours as needed for indigestion   buprenorphine-naloxone (SUBOXONE) 8-2 MG SUBL sublingual tablet One tab daily am and 1/2 tab daily q pm.   guaiFENesin (ROBITUSSIN) 20 mg/mL SOLN solution Take 10 mLs by mouth every 4 hours as needed for cough   IBUPROFEN PO Take 200-400 mg by mouth every 6 hours as needed for moderate pain   ketoconazole (NIZORAL) 2 % cream Apply topically 2 times daily   ketoconazole (NIZORAL) 2 % shampoo Apply to the affected area and wash off after 5 minutes.   loratadine (CLARITIN) 10 MG tablet Take 10 mg by mouth daily as needed for allergies   MELATONIN PO Take 3 mg by mouth nightly as needed   mupirocin (BACTROBAN) 2 % ointment Place in nose 2 x day until sx resolve   phenol-menthol (CEPASTAT) 14.5 MG lozenge Place 1 lozenge inside cheek every 2 hours as needed for moderate pain   senna-docusate (SENOKOT-S;PERICOLACE) 8.6-50 MG per tablet Take 2 tablets by mouth daily as needed for constipation   traZODone (DESYREL) 50 MG tablet Take 1 tablet (50 mg) by mouth nightly as needed for sleep     Current Facility-Administered Medications on File Prior to Visit:  Self Administer Medications: Behavioral Services        No Known Allergies      REVIEW OF SYSTEMS:  General: No acute  withdrawal symptoms.  No recent infections or fever  Resp: No coughing, wheezing or shortness of breath  CV: No chest pains or palpitations  GI: No nausea, vomiting, abdominal pain, diarrhea, constipation  : No urinary frequency or dysuria,     Musculoskeletal: No significant muscle or joint pains, No edema  Neurologic: No numbness, tingling, weakness, problems with balance or coordination  Psychiatric: No acute concerns  Skin: No rashes    OBJECTIVE:    PHYSICAL EXAM:  /62  Pulse 104  Resp 18  Wt 165 lb (74.8 kg)  SpO2 99%  BMI 21.18 kg/m2    GENERAL APPEARANCE:  alert, comfortable appearing  EYES:Eyes grossly normal to inspection  NEURO:  Gait normal.  No tremor. Coordination intact.   MENTAL STATUS EXAM:  Appearance/Behavior: No appearant distress  Speech: Normal  Mood/Affect: normal affect  Insight: Adequate      Results for orders placed or performed in visit on 07/19/18   Urine Drugs of Abuse Screen Panel 13   Result Value Ref Range    Cannabinoids (33-aqp-5-carboxy-9-THC) Detected, Abnormal Result (A) NDET^Not Detected ng/mL    Phencyclidine (Phencyclidine) Not Detected NDET^Not Detected ng/mL    Cocaine (Benzoylecgonine) Not Detected NDET^Not Detected ng/mL    Methamphetamine (d-Methamphetamine) Not Detected NDET^Not Detected ng/mL    Opiates (Morphine) Not Detected NDET^Not Detected ng/mL    Amphetamine (d-Amphetamine) Not Detected NDET^Not Detected ng/mL    Benzodiazepines (Nordiazepam) Not Detected NDET^Not Detected ng/mL    Tricyclic Antidepressants (Desipramine) Not Detected NDET^Not Detected ng/mL    Methadone (Methadone) Not Detected NDET^Not Detected ng/mL    Barbiturates (Butalbital) Not Detected NDET^Not Detected ng/mL    Oxycodone (Oxycodone) Not Detected NDET^Not Detected ng/mL    Propoxyphene (Norpropoxyphene) Not Detected NDET^Not Detected ng/mL    Buprenorphine (Buprenorphine) Detected, Abnormal Result (A) NDET^Not Detected ng/mL           ASSESSMENT/PLAN:    F11.23) Opioid  withdrawal (H)  (primary encounter diagnosis)     Plan: buprenorphine-naloxone (SUBOXONE) 8-2 MG SUBL         sublingual tablet        Incresae to 8mg q am and 8mg q pm.   Follow up 2 wk.   [facility] and plans for aftercare.      Suboxone risk/benefit/side effect and intended purposes reviewed.       Opioid warning reviewed.  Risk of overdose following a period of abstinence due to decrease tolerance was discussed including risk of death.   Risk of overdose if using Suboxone with other substances particuarly benzodiazepines/alcohol was reviewed.      Counseled the patient on the importance of having a recovery program in addition to Medication assisted treatment.  Components include having some type of sober network, avoiding isolating, having willingness  to change, avoiding triggers and managing cravings.    Encouraged other services such as counseling, 12 step or other self-help organizations.       Strongly recommended abstain from alcohol, benzodiazepines, THC, opioids and other drugs of abuse.  Increased risk of relapse for opioids with use of these substances discussed.  Increased risk of overdose/death with use of other substances particularly benzodiazepines/alcohol reviewed.     Refer to higher level of services as needed     Naloxone offered.  Patient has RX.      Patient advised of office protocols for refills/appointments.          (F33.9) Episode of recurrent major depressive disorder, unspecified depression episode severity (H)  Plan: will need follow up PCP. Monitor for need for  medications.   Encouraged mental health counseling        (Z87.417) Former smoker  Plan: ecourage abstinence.          ENCOUNTER FOR LONG TERM USE OF HIGH RISK MEDICATION   High Risk Drug Monitoring?  YES   Drug being monitored: Buprenorphine   Reason for drug: Opioid Use Disorder   What is being monitored?: Dosage, Cravings, Trigger, side effects, and continued abstinence.      Opioid warning reviewed.  Risk of  overdose following a period of abstinence due to decrease tolerance was discussed including risk of death.   Risk of overdose if using Suboxone with other substances particuarly benzodiazepines/alcohol was reviewed.          Sera Bardaels MD  [facility]  [phone]

## 2018-07-26 NOTE — MR AVS SNAPSHOT
After Visit Summary   7/26/2018    Uri Valladares    MRN: [id number]           Patient Information     Date Of Birth          1990        Visit Information        Provider Department      7/26/2018 8:40 AM Sera Bardales MD [facility]        Today's Diagnoses     Opioid withdrawal (H)    -  1    Episode of recurrent major depressive disorder, unspecified depression episode severity (H)        Former smoker        High risk medication use           Follow-ups after your visit        Your next 10 appointments already scheduled     Jul 27, 2018  7:15 AM CDT   Treatment with ADULT LODGING PLUS A   [facility] ([facility])    [address] [place] [address]   [phone]            Jul 28, 2018  7:15 AM CDT   Treatment with ADULT LODGING PLUS A   [facility] ([facility])    [address] [place] [address]   [phone]            Jul 29, 2018  7:15 AM CDT   Treatment with ADULT LODGING PLUS A   [facility] ([facility])    [address] [place] [address]   [phone]            Jul 30, 2018  7:15 AM CDT   Treatment with ADULT LODGING PLUS A   [facility] ([facility])    [address] [place] [address]   [phone]            Jul 31, 2018  7:15 AM CDT   Treatment with ADULT LODGING PLUS A   [facility] ([facility])    [address] [place] [address]   [phone]            Aug 01, 2018  7:15 AM CDT   Treatment with ADULT LODGING PLUS A   [facility] ([facility])    [address] [place] [address]  [address]   [phone]            Aug 02, 2018  7:15 AM CDT   Treatment with ADULT LODGING PLUS A   [facility] ([facility])    [facility] [address] [address]   [phone]            Aug 03, 2018  7:15 AM CDT   Treatment with ADULT LODGING PLUS A   [facility] ([facility])    [facility] [address] [address]   [phone]            Aug 04, 2018  7:15 AM CDT   Treatment with ADULT LODGING PLUS A   [facility] ([facility])    [facility] [address] [address]   [phone]            Aug 05, 2018  7:15 AM CDT   Treatment with ADULT LODGING PLUS A   [facility] ([facility])    [address] [place] [address]   [phone]              Who to contact     If you have questions or need follow up information about today's clinic visit or your schedule please contact [facility] directly at [phone].  Normal or non-critical lab and imaging results will be communicated to you by MyChart, letter or phone within 4 business days after the clinic has received the results. If you do not hear from us within 7 days, please contact the clinic through [employer] or phone. If you have a critical or abnormal lab result, we will notify you by phone as soon as possible.  Submit refill requests through [employer] or call your pharmacy and they will forward the refill request to us. Please allow 3 business days for your refill to be completed.          Additional Information About Your Visit        [employer] Information     [employer] lets you send messages to your doctor, view your test results, renew your prescriptions, schedule appointments and more. To sign up, go to [email or web address] .  "Click on \"Log in\" on the left side of the screen, which will take you to the Welcome page. Then click on \"Sign up Now\" on the right side of the page.     You will be asked to enter the access code listed below, as well as some personal information. Please follow the directions to create your username and password.     Your access code is: [email or web address]  Expires: 10/8/2018  9:51 AM     Your access code will  in 90 days. If you need help or a new code, please call your [facility] clinic or [phone].        Care EveryWhere ID     This is your Care EveryWhere ID. This could be used by other organizations to access your [facility] medical records  [id number]        Your Vitals Were     Pulse Respirations Pulse Oximetry BMI (Body Mass Index)          104 18 99% 21.18 kg/m2         Blood Pressure from Last 3 Encounters:   18 118/62   18 122/78   07/10/18 129/71    Weight from Last 3 Encounters:   18 165 lb (74.8 kg)   18 166 lb (75.3 kg)   18 164 lb (74.4 kg)              We Performed the Following     Urine Drugs of Abuse Screen Panel 13          Today's Medication Changes          These changes are accurate as of 18 12:10 PM.  If you have any questions, ask your nurse or doctor.               Start taking these medicines.        Dose/Directions    melatonin 3 MG Caps   Used for:  Opioid withdrawal (H)   Started by:  Sera Bardales MD        Dose:  6 mg   Take 6 mg by mouth At Bedtime   Quantity:  60 capsule   Refills:  1         These medicines have changed or have updated prescriptions.        Dose/Directions    buprenorphine-naloxone 8-2 MG Subl sublingual tablet   Commonly known as:  SUBOXONE   This may have changed:  additional instructions   Used for:  Opioid withdrawal (H)   Changed by:  Sera Bardales MD        One tab daily am and 1 tab daily q pm.   Quantity:  28 each   Refills:  0         Stop taking these medicines if you haven't already. Please contact " your care team if you have questions.     traZODone 50 MG tablet   Commonly known as:  DESYREL   Stopped by:  Sera Bardales MD                Where to get your medicines      These medications were sent to [facility] [place], [place] - [address]  [address], [facility] [address]     Phone:  [phone]     melatonin 3 MG Caps         Some of these will need a paper prescription and others can be bought over the counter.  Ask your nurse if you have questions.     Bring a paper prescription for each of these medications     buprenorphine-naloxone 8-2 MG Subl sublingual tablet                Primary Care Provider Fax #    Physician No Ref-Primary [phone]       No address on file        Equal Access to Services     NEAL GARCIA : Reynaldo holbrook Sobernarda, waaxda jassadaha, qaybta kaalmada adeantoninoyada, bear salgado. So [facility] [phone].    ATENCIÓN: Si habla español, tiene a dutta disposición servicios gratuitos de asistencia lingüística. Llame al [phone].    We comply with applicable federal civil rights laws and [place] laws. We do not discriminate on the basis of race, color, national origin, age, disability, sex, sexual orientation, or gender identity.            Thank you!     Thank you for choosing [facility]  for your care. Our goal is always to provide you with excellent care. Hearing back from our patients is one way we can continue to improve our services. Please take a few minutes to complete the written survey that you may receive in the mail after your visit with us. Thank you!             Your Updated Medication List - Protect others around you: Learn how to safely use, store and throw away your medicines at www.disposemymeds.org.          This list is accurate as of 7/26/18 12:10 PM.  Always use your most recent med list.                   Brand Name Dispense Instructions for use Diagnosis     alum & mag hydroxide-simethicone 200-200-20 MG/5ML Susp suspension    MYLANTA/MAALOX     Take 30 mLs by mouth every 6 hours as needed for indigestion        buprenorphine-naloxone 8-2 MG Subl sublingual tablet    SUBOXONE    28 each    One tab daily am and 1 tab daily q pm.    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       loratadine 10 MG tablet    CLARITIN     Take 10 mg by mouth daily as needed for allergies        melatonin 3 MG Caps     60 capsule    Take 6 mg by mouth At Bedtime    Opioid withdrawal (H)       MELATONIN PO      Take 3 mg by mouth nightly as needed        mupirocin 2 % ointment    BACTROBAN    22 g    Place in nose 2 x day until sx resolve    Skin irritation       phenol-menthol 14.5 MG lozenge      Place 1 lozenge inside cheek every 2 hours as needed for moderate pain        senna-docusate 8.6-50 MG per tablet    SENOKOT-S;PERICOLACE     Take 2 tablets by mouth daily as needed for constipation        TYLENOL PO      Take 325-650 mg by mouth every 4 hours as needed for mild pain or fever        * Notice:  This list has 2 medication(s) that are the same as other medications prescribed for you. Read the directions carefully, and ask your doctor or other care provider to review them with you.

## 2018-07-26 NOTE — PROGRESS NOTES
AFTERCARE UPDATE  7/26/18    RE: Uri HAQUE-- Per Bryce of [facility], she has received pt's clinicals yet reports the situation remains the same since our first contact with no beds currently available. Bryce stated she will continue to monitor the situation and contact us if something becomes available.    Gonzalo Emery/ [facility]   [facility]

## 2018-07-27 ENCOUNTER — HOSPITAL ENCOUNTER (OUTPATIENT)
Dept: BEHAVIORAL HEALTH | Facility: CLINIC | Age: 28
End: 2018-07-27
Attending: FAMILY MEDICINE
Payer: COMMERCIAL

## 2018-07-27 PROCEDURE — 10020000 ZZH LODGING PLUS FACILITY CHARGE ADULT

## 2018-07-27 PROCEDURE — H2035 A/D TX PROGRAM, PER HOUR: HCPCS

## 2018-07-27 PROCEDURE — H2035 A/D TX PROGRAM, PER HOUR: HCPCS | Mod: HQ

## 2018-07-27 NOTE — PROGRESS NOTES
"Pt came to writer at approximately 1430 c/o SEGOVIA stating he wanted to be excused from group. Counselor had previously informed writer pt had not been in group all afternoon but had been laying in his room. Writer asked pt if he had taken any medication for his HA, pt had not so writer encouraged pt to self administer ibuprofen, push fluids and utilize peppermint essential oil and try and go to the last hour of group. Pt states \"they were just watching a movie and they are just going to watch some documentary next and I am not going to get anything out of it so why should I even go?\", writer encouraged pt that he would only get out of group what he put in to it and he definitely would not get anything out of group if he did not go. Pt again states there is not point in him going to group as he is not going to listen and going to sleep through group anyways, writer advised that was his choice but he is only in treatment for a short time so he should try and participate as much as possible. Pt left and came back approximately 5 minutes later stating he was going to go to the ER for his HA, writer advised that was ok. Pt did not go to ED but went to group.   Fabiana Oneal RN     "

## 2018-07-27 NOTE — PROGRESS NOTES
"Patient left afternoon group at 1:30 and was found sleeping in his room.  Patient reported not feeling well, however, patient did not notify counseling staff or see [facility] nurse.  This writer directed patient he needed to have staff approval to miss any programming.  This writer with counselor Stas Mack and counseling intern then spoke with patient and this writer mentioned to patient previous conversations counselor had with patient regarding attending all programming and on time.  Continued missed programming could result in discharge from programming.  This writer advised patient to meet with nursing staff to follow up with \"not feeling well\".  Patient acknowledged he would.  Counseling staff to follow up with clinical manager as needed.    MELLO Simms    "

## 2018-07-27 NOTE — PROGRESS NOTES
Patient:  Uri Valladares            Adult CD Progress Note and Treatment Plan Review     Attendance  Please refer to OP BEH CD Adult Attendance Record Documentation Flowsheet    Support group attended this week: yes    Reporting sobriety:  yes    Treatment Plan     Treatment Plan Review competed on: 7/27/2018       Client preferred learning style: Hands on  Demonstration    Staff Members contributing Basilia Urrutia [facility], Ricci Mack [facility]                     Received Supervision: Yes    Client: Appears to struggle to actively participate in group and treatment plan goals..    Client received copy of plan/revised plan: Yes    Client agrees with plan/revised plan: Yes        Changes to Treatment Plan: No    New Goals added since last review No additional goals added at this time.    Goals worked on since last review Patient reports having completed assignments at this time, however, patient has not presented any completed assignments to staff or presented in group this week.    Strategies effective: Counseling staff continues to work with patient at this time.    Strategies need these changes: Counseling staff continues to work with patient at this time.    1) Care Coordination Activities:  Patient is working with [facility]  and is currently on a wait list for [facility] residential program.  2) Medical, Mental Health and other appointments the client attended: Patient attended scheduled session with [facility] staff mental health therapist.  3) Medication issues: No known issues at this time.   4) Physical and mental health problems: Counseling staff is working to transition patient to a program that can better address patients mental health and chemical health.  5) Any changes in Vulnerable Adult Status?  No If yes, add to treatment plan and individual abuse prevention plan.  6) Review and evaluation of the individual abuse prevention plan: Current IAPP for this program is adequate for this  "client          ASAM Risk Rating:    Dimension 1 1 Patient reports a last use date of 7/7/2018.  Patient is prescribed Suboxone maintenance program.  No issues to address at this time.     Dimension 2 0 Patient denies any biomedical concerns that would interefere with full participation in group and treatment processes.  Patient is prescribed medications and is currenlty medication complaint.  Patient is covered under health insurance and reports he is able to navigate the health care system independenly.  Patient attended weekly nursing lecture on 7/22/2018 and smoking cessation group on 7/21/2018.      Dimension 3 2 Patient reports a formal mental health diagnosis of depression.  Patient denies any suicide and/or self harm ideation at this time.  Patient met with staff mental health therapist and reports he will continues to do so while in [facility] programming.  Patient continues to complete his daily goal journal inconsistantly and share in group processes.  Patient continues to explore his behavioral patterns, including the defenses he uses and keeping focus on himself.  Patient is currently on the wait list for [facility] treatment program to better assist with patient to address both his mental health and chemical health needs.  Patient attended  led spirituality group on 7/25/2018.    Dimension 4 2 Patient verbalizd his willingness to follow treatment recommendations.  At this time, patient stuggles to remain engaged and fully participate in group and treatment processes.  Patient did complete his initial assignment, \"Drug Progression\" in group, however, at this time, patient has not completed the second part of the initial assignment or any other assignments as directed in his treatment plan.  Patient continues to display minimal engagement in group and treatment processes.      Dimension 5 4 Patient continues to work to gain an understanding of the relationship between his unmanaged " emotional health, mental health, and his continued substance use at this time.  Patient attended relapse prevention workshop on 7/21/2018.  Patient appears to be at a high risk for continued substance use at this time.        Dimension 6 4 Patient reports he is currently homeless and unemployed.  Patient is currently on the wait list for [facility] residential treatment program.  Patient lacks a strong sober network at this time.     Guide to Risk Ratings for Suicidality:   IDEATION: Active thoughts of suicide? INTENT: Intent to follow on suicide? PLAN: Plan to follow through on suicide? Level of Risk:   IF Yes Yes Yes Patient = High Emergent   IF Yes Yes No Patient = High Urgent/Non-Emergent   IF Yes No No Patient = Moderate Non-Urgent   IF No No   No Patient = Low Risk   The patient's ADDITIONAL RISK FACTORS and lack of PROTECTIVE FACTORS may increase their overall suicide risk ratings.     Patient's/client's current risk rating:  Low Risk    Family Involvement:   client refused to sign JANINE    Data:   offered feedback      Intervention:   Behavior modification  Counselor feedback  Education  Emotional management  Group feedback  Relapse prevention  Mental health education      Assessment:   Stages of Change Model  Preparation/Determination    Appears/Sounds:  Cooperative with redirection.      Plan:  Next treatment task referral out  Monitor emotional/physical health      MELLO Simms

## 2018-07-28 ENCOUNTER — HOSPITAL ENCOUNTER (OUTPATIENT)
Dept: BEHAVIORAL HEALTH | Facility: CLINIC | Age: 28
End: 2018-07-28
Attending: FAMILY MEDICINE
Payer: COMMERCIAL

## 2018-07-28 PROCEDURE — 10020000 ZZH LODGING PLUS FACILITY CHARGE ADULT

## 2018-07-28 PROCEDURE — H2035 A/D TX PROGRAM, PER HOUR: HCPCS

## 2018-07-29 ENCOUNTER — HOSPITAL ENCOUNTER (OUTPATIENT)
Dept: BEHAVIORAL HEALTH | Facility: CLINIC | Age: 28
End: 2018-07-29
Attending: FAMILY MEDICINE
Payer: COMMERCIAL

## 2018-07-29 PROCEDURE — 10020000 ZZH LODGING PLUS FACILITY CHARGE ADULT

## 2018-07-29 PROCEDURE — H2035 A/D TX PROGRAM, PER HOUR: HCPCS

## 2018-07-30 ENCOUNTER — HOSPITAL ENCOUNTER (OUTPATIENT)
Dept: BEHAVIORAL HEALTH | Facility: CLINIC | Age: 28
End: 2018-07-30
Attending: FAMILY MEDICINE
Payer: COMMERCIAL

## 2018-07-30 NOTE — PROGRESS NOTES
AFTERCARE NOTE  7/30/18    Per Bryce of [facility] Residential program, [facility] has a bed available for Uri today, 7/30/18.  I relayed this information to Uri's primary counselor and she stated she would meet with pt and inform him of this situation.    Gonzalo Emery/ [facility]   [facility]

## 2018-07-30 NOTE — PROGRESS NOTES
LATE NOTE ENTRY:    During group relationship activity workshop on 7/28/2018, this writer noticed patient drawing instead of working on worksheet as part of relationships workshop.  This writer asked patient about the worksheet and patient replied he felt he would work on it later as he didn't feel any of it would apply to him.  This writer encouraged writer to work on assignment and share with the group as instructed by the group facilitator.  Patient then began to work on one of the sheets.    MELLO Simms

## 2018-08-01 NOTE — PROGRESS NOTES
Late Note Entry     7/30/18    Patient has made minimal progress working to complete his assignments and work towards his treatment plan goals.  Patient appears to be minimally engaged in group and treatment processes and is often redirected by [facility] staff.  Per care conference with clinical manager and based on patients significant mental health history, patient may benefit from a higher level of care to assist with addressing both his mental health and chemical health needs.  A referral to [facility] was made in an effort to assist with patient to offer services to benefit patient.  On 7/30/2018, [facility] notified [facility] bed availability as patient could transition to their residential dual diagnosis (MI/CD) program.  Patient agreed to transition to [facility] and was provided transportation via [employer] services to provide [id number] service.

## 2018-08-01 NOTE — ADDENDUM NOTE
Encounter addended by: Basilia Urrutia LADC on: 8/1/2018  7:50 AM<BR>     Actions taken: Sign clinical note

## 2018-08-02 NOTE — PROGRESS NOTES
CHEMICAL DEPENDENCY DISCHARGE SUMMARY.      REFERRAL SOURCE:  [facility]/Detoxification.      ADMISSION DATE 7/10/2018.   DATE OF LAST SESSION: 7/29/2018.   DISCHARGE DATE:  7/30/2018      EVALUATION COUNSELOR:  Dayana Perez MS, [facility].   TREATMENT COUNSELING TEAM:  Basilia Urrutia, [facility], and Ricci Mack, [facility].   PROGRAM:  [facility].   DIAGNOSES:    1.  303.90 Alcohol use disorder.   2.  304.00  Opiate use disorder.   3.  304.30  Cannabis use disorder.      DISCHARGE STATUS:  The patient's care was transferred to [facility] as to provide patient with a higher level of care which includes programming to better assist patient with both his mental health and chemical health needs.     LAST USE DATE:  As reported by the patient, 7/7/2018.   DAYS OF TREATMENT COMPLETED:  20 days.      PRESENTING PROBLEMS:  The patient admitted to [facility] due to continued substance use.      SERVICES PROVIDED:  Our services included assessment, treatment planning and education regarding chemical dependency, mental illness, relationships and relapse prevention.  The patient transition to [facility] as a continuing care to better patient with both his mental health and chemical health needs.      ISSUES ADDRESSED IN TREATMENT:   DIMENSION 1:  Acute Intoxication/Withdrawal Potential:    Admission risk rating 1,   Discharge risk rating 1.    Upon admission, the patient reported a last use date of opiates as 7/7/2018, Marijuana as 7/6/2018, and alcohol as 7/4/2018. Patient admitted to [facility] from detox services.  The patient attended scheduled appointment with Suboxone provider and was compliant with medications.  No issues to address while in [facility] programming.      DIMENSION 2:  Biomedical Complications and Conditions:    Admission risk rating 0.    Discharge Risk rating 0.    Upon admission, the patient denied  "any biomedical concerns that would have interfered with full participation in group and treatment processes.  The patient was covered under health insurance and reported he was able to navigate the healthcare system independently.      DIMENSION 3:  Emotional, Behavioral, Cognitive Conditions and Complications:    Admission risk rating 2,   Discharge risk rating 2.    Upon admission, the patient reported a formal mental health diagnosis of major depressive disorder.  The patient's suicide risk rating during his evaluation was assessed as \"present/non-urgent\".   The patient denied any suicide and/or self-harm ideation while in [facility] programming.  The patient did develop a safety plan.  Patient met with staff mental health therapist while in programming.  The patient demonstrated persistent interpersonal struggles in group and free-time settings.  The patient verbalized struggling to gain anything from the group and lectures. The patient presented as defensive and it was often redirected by staff. Based on patient's behaviors and inability to fully participate in [facility] programming, the patient was referred to a higher level of care.  The patient transitioned to [facility] as to better assist with patient's mental health and chemical health needs.       DIMENSION 4:  Readiness for Change:    Admission risk rating 2,   Discharge risk rating 2.    Upon admission, the patient reported his willingness to follow treatment recommendations with active reinforcement.  The patient reported active legal involvement.  While in programming, the patient struggled to engage in group and treatment processes and appeared to be minimally engaged.  The patient struggled to complete homework assignments as directed in his treatment plan and appeared to struggle with working towards treatment plan goals.      DIMENSION 5:  Relapse, Continued Use and Continued Problem Potential:    Admission risk rating 4   Discharge risk " rating 4.    Upon admission, the patient reported an extensive use history, reporting this as his first CD treatment experience.  The patient denied any significant periods of sobriety outside a structured setting.  The patient appeared to struggle with understanding the relationship between his unmanaged mental health, emotional health and his continued substance use including relapse factors.  The patient appears to be a high risk for continued substance use at this time.        DIMENSION 6:  Recovery Environment.    Admission risk rating 4,   Discharge risk rating 3.     Upon admission, the patient reported he was homeless.  The patient transitioned to [facility] residential CD treatment programming.  Patient reports his family is supportive of his recovery; however, he does report they live in [place].  The patient is currently unemployed and lacks a strong sober network at this time.      STRENGTHS:  The patient identified strengths as having joo in God, family involvement and a readiness to learn.      PROGNOSIS: The patient was transitioned to [facility] as to better address both patient's mental health and chemical health needs.      CONTINUING CARE RECOMMENDATIONS:   1.  Abstain from all mood-altering chemicals unless prescribed by a licensed medical provider.   2.  Take medications as prescribed.   3.  Maintain contact with established physician/psychiatrist and follow recommendations.   4.  Attend and complete residential CD treatment programming at [facility].   5.  Gain a mental health therapist in the community and follow recommendations.   6.  Attend a minimum of three AA/NA/sober support groups in the community weekly.   7.  Gain a male sponsor and meet with him weekly.   8.  Remain law abiding and follow all conditions of the court/probation.      LIVING ARRANGEMENTS:  The patient was discharged to [facility].         This information has been disclosed to you from records protected by Federal  confidentiality rules (42 CFR part 2). The Federal rules prohibit you from making any further disclosure of this information unless further disclosure is expressly permitted by the written consent of the person to whom it pertains or as otherwise permitted by 42 CFR part 2. A general authorization for the release of medical or other information is NOT sufficient for this purpose. The Federal rules restrict any use of the information to criminally investigate or prosecute any alcohol or drug abuse patient.      JEREMY MERCADO [facility]             D: 2018   T: 2018   MT: ARUN      Name:     JOSELIN ARAGON   MRN:      [id number]        Account:      [id number]   :      1990           Visit Date:   2018      Document: [id number]

## 2018-08-03 NOTE — ADDENDUM NOTE
Encounter addended by: Basilia Urrutia LADC on: 8/3/2018 11:35 AM<BR>     Actions taken: Episode resolved

## 2018-08-09 ENCOUNTER — OFFICE VISIT (OUTPATIENT)
Dept: ADDICTION MEDICINE | Facility: CLINIC | Age: 28
End: 2018-08-09
Payer: COMMERCIAL

## 2018-08-09 VITALS
RESPIRATION RATE: 18 BRPM | HEART RATE: 79 BPM | DIASTOLIC BLOOD PRESSURE: 74 MMHG | WEIGHT: 166 LBS | BODY MASS INDEX: 21.31 KG/M2 | OXYGEN SATURATION: 100 % | SYSTOLIC BLOOD PRESSURE: 128 MMHG

## 2018-08-09 DIAGNOSIS — Z79.899 HIGH RISK MEDICATION USE: ICD-10-CM

## 2018-08-09 DIAGNOSIS — F33.9 EPISODE OF RECURRENT MAJOR DEPRESSIVE DISORDER, UNSPECIFIED DEPRESSION EPISODE SEVERITY (H): ICD-10-CM

## 2018-08-09 DIAGNOSIS — F11.93 OPIOID WITHDRAWAL (H): Primary | ICD-10-CM

## 2018-08-09 DIAGNOSIS — Z87.891 FORMER SMOKER: ICD-10-CM

## 2018-08-09 LAB
AMPHETAMINES UR QL: NOT DETECTED NG/ML
BARBITURATES UR QL SCN: NOT DETECTED NG/ML
BENZODIAZ UR QL SCN: NOT DETECTED NG/ML
BUPRENORPHINE UR QL: ABNORMAL NG/ML
CANNABINOIDS UR QL: NOT DETECTED NG/ML
COCAINE UR QL SCN: NOT DETECTED NG/ML
D-METHAMPHET UR QL: NOT DETECTED NG/ML
METHADONE UR QL SCN: NOT DETECTED NG/ML
OPIATES UR QL SCN: NOT DETECTED NG/ML
OXYCODONE UR QL SCN: NOT DETECTED NG/ML
PCP UR QL SCN: NOT DETECTED NG/ML
PROPOXYPH UR QL: NOT DETECTED NG/ML
TRICYCLICS UR QL SCN: NOT DETECTED NG/ML

## 2018-08-09 PROCEDURE — 99214 OFFICE O/P EST MOD 30 MIN: CPT | Performed by: PEDIATRICS

## 2018-08-09 PROCEDURE — 80306 DRUG TEST PRSMV INSTRMNT: CPT | Performed by: PEDIATRICS

## 2018-08-09 RX ORDER — POLYETHYLENE GLYCOL 3350 17 G
2 POWDER IN PACKET (EA) ORAL
Qty: 360 LOZENGE | Refills: 0 | Status: SHIPPED | OUTPATIENT
Start: 2018-08-09

## 2018-08-09 RX ORDER — BUPRENORPHINE HYDROCHLORIDE AND NALOXONE HYDROCHLORIDE DIHYDRATE 8; 2 MG/1; MG/1
TABLET SUBLINGUAL
Qty: 60 EACH | Refills: 0 | Status: SHIPPED | OUTPATIENT
Start: 2018-08-09 | End: 2018-09-06

## 2018-08-09 NOTE — MR AVS SNAPSHOT
"              After Visit Summary   8/9/2018    Uri Valladares    MRN: [id number]           Patient Information     Date Of Birth          1990        Visit Information        Provider Department      8/9/2018 2:00 PM Sera Bardales MD [facility]        Today's Diagnoses     Opioid withdrawal (H)    -  1    Episode of recurrent major depressive disorder, unspecified depression episode severity (H)        Former smoker        High risk medication use           Follow-ups after your visit        Your next 10 appointments already scheduled     Sep 06, 2018  2:00 PM CDT   Return Visit with Sera Bardales MD   [facility] ([facility])    [address]  [address]  [facility] [address] [phone]              Who to contact     If you have questions or need follow up information about today's clinic visit or your schedule please contact [facility] directly at [phone].  Normal or non-critical lab and imaging results will be communicated to you by MyChart, letter or phone within 4 business days after the clinic has received the results. If you do not hear from us within 7 days, please contact the clinic through "[employer]hart or phone. If you have a critical or abnormal lab result, we will notify you by phone as soon as possible.  Submit refill requests through [employer] or call your pharmacy and they will forward the refill request to us. Please allow 3 business days for your refill to be completed.          Additional Information About Your Visit        "[facility] [facility] Information     [facility] [employer] lets you send messages to your doctor, view your test results, renew your prescriptions, schedule appointments and more. To sign up, go to [email or web address] . Click on \"Log in\" on the left side of the screen, which will take you to the Welcome page. Then click on \"Sign up Now\" on the right side " of the page.     You will be asked to enter the access code listed below, as well as some personal information. Please follow the directions to create your username and password.     Your access code is: [email or web address]  Expires: 10/8/2018  9:51 AM     Your access code will  in 90 days. If you need help or a new code, please call your [place] clinic or [phone].        Care EveryWhere ID     This is your Care EveryWhere ID. This could be used by other organizations to access your [place] medical records  [id number]        Your Vitals Were     Pulse Respirations Pulse Oximetry BMI (Body Mass Index)          79 18 100% 21.31 kg/m2         Blood Pressure from Last 3 Encounters:   18 128/74   18 118/62   18 122/78    Weight from Last 3 Encounters:   18 166 lb (75.3 kg)   18 165 lb (74.8 kg)   18 166 lb (75.3 kg)              We Performed the Following     Urine Drugs of Abuse Screen Panel 13          Today's Medication Changes          These changes are accurate as of 18  4:04 PM.  If you have any questions, ask your nurse or doctor.               Start taking these medicines.        Dose/Directions    nicotine 7 MG/24HR 24 hr patch   Commonly known as:  NICODERM CQ   Used for:  Former smoker   Started by:  Sera Bardales MD        Dose:  1 patch   Place 1 patch onto the skin every 24 hours   Quantity:  30 patch   Refills:  0       nicotine polacrilex 2 MG lozenge   Commonly known as:  NICOTINE MINI   Used for:  Former smoker   Started by:  Sera Bardales MD        Dose:  2 mg   Place 1 lozenge (2 mg) inside cheek every hour as needed for smoking cessation   Quantity:  360 lozenge   Refills:  0            Where to get your medicines      These medications were sent to [facility]/pharmacy #[id number] - [place], [place] -  [address], [place] [address]     Phone:  [phone]     nicotine 7 MG/24HR 24 hr patch    nicotine polacrilex 2 MG  lozenge         Some of these will need a paper prescription and others can be bought over the counter.  Ask your nurse if you have questions.     Bring a paper prescription for each of these medications     buprenorphine-naloxone 8-2 MG Subl sublingual tablet                Primary Care Provider Fax #    Physician No Ref-Primary [phone]       No address on file        Equal Access to Services     MAIKTAMMY JOSE : Hadii cesar ku hadasho Soomaali, waaxda luqadaha, qaybta kaalmada adeegyada, bear bonilla tobijose almanzadakotahmaude salgado. So [facility] [phone].    ATENCIÓN: Si habla español, tiene a dutta disposición servicios gratuitos de asistencia lingüística. Llame al [phone].    We comply with applicable federal civil rights laws and [place] laws. We do not discriminate on the basis of race, color, national origin, age, disability, sex, sexual orientation, or gender identity.            Thank you!     Thank you for choosing [facility]  for your care. Our goal is always to provide you with excellent care. Hearing back from our patients is one way we can continue to improve our services. Please take a few minutes to complete the written survey that you may receive in the mail after your visit with us. Thank you!             Your Updated Medication List - Protect others around you: Learn how to safely use, store and throw away your medicines at www.disposemymeds.org.          This list is accurate as of 8/9/18  4:04 PM.  Always use your most recent med list.                   Brand Name Dispense Instructions for use Diagnosis    alum & mag hydroxide-simethicone 200-200-20 MG/5ML Susp suspension    MYLANTA/MAALOX     Take 30 mLs by mouth every 6 hours as needed for indigestion        buprenorphine-naloxone 8-2 MG Subl sublingual tablet    SUBOXONE    60 each    One tab daily am and 1 tab daily q pm.    Opioid withdrawal (H)       guaiFENesin 20 mg/mL Soln solution    ROBITUSSIN     Take  10 mLs by mouth every 4 hours as needed for cough        IBUPROFEN PO      Take 200-400 mg by mouth every 6 hours as needed for moderate pain        * ketoconazole 2 % cream    NIZORAL    60 g    Apply topically 2 times daily    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       * ketoconazole 2 % shampoo    NIZORAL    120 mL    Apply to the affected area and wash off after 5 minutes.    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       loratadine 10 MG tablet    CLARITIN     Take 10 mg by mouth daily as needed for allergies        melatonin 3 MG Caps     60 capsule    Take 6 mg by mouth At Bedtime    Opioid withdrawal (H)       MELATONIN PO      Take 3 mg by mouth nightly as needed        mupirocin 2 % ointment    BACTROBAN    22 g    Place in nose 2 x day until sx resolve    Skin irritation       nicotine 7 MG/24HR 24 hr patch    NICODERM CQ    30 patch    Place 1 patch onto the skin every 24 hours    Former smoker       nicotine polacrilex 2 MG lozenge    NICOTINE MINI    360 lozenge    Place 1 lozenge (2 mg) inside cheek every hour as needed for smoking cessation    Former smoker       phenol-menthol 14.5 MG lozenge      Place 1 lozenge inside cheek every 2 hours as needed for moderate pain        senna-docusate 8.6-50 MG per tablet    SENOKOT-S;PERICOLACE     Take 2 tablets by mouth daily as needed for constipation        TYLENOL PO      Take 325-650 mg by mouth every 4 hours as needed for mild pain or fever        * Notice:  This list has 2 medication(s) that are the same as other medications prescribed for you. Read the directions carefully, and ask your doctor or other care provider to review them with you.

## 2018-08-09 NOTE — PROGRESS NOTES
SUBJECTIVE:                                                    BUPRENORPHINE FOLLOW UP:    Uri Valladares is a 27 year old male who presents to clinic today for follow up of Buprenorphine.      Date of last visit:  8/6/2018    [place] Board of Pharmacy Data Base Reviewed:    YES;     7/26/18 Suboxone 8mg tab #28    Brief History:  Initial visit 7/19/18  Detox 7/7/18-7/10/18  Using opioid off and on for 10 yr.   More recently tolerance increasing to the poin of Percocet/Fentanyl  up to 90-100mcg/day snorting.  No IV use.  Moderated hx of alcohol use.  Quit smoking in detox.  Hx of depression.          HPI:    Currently at [facility].  Current Suboxone 8mg bid.   Dose was increased at last visit and this seems to have been helpful.   Seems to be working well.   Some craving with transition.  Started smoking again with transition out of [facility].  Has some guilt about this.   Is doing a lot of meditation.   Finding this very helpful.     Eventually would like to wean from Suboxone.      Status since last visit: Since last visit patient has been:stable    Intensity:     There has been: mild intermittent craving    Suboxone Dose: adequate    Progression of Symptoms/Precipitating Factors:     Cues to use and relapse triggers:Anxiety and Depression    Trigger have been:  mild    Accompanying Signs & Symptoms:    Side Effects: none    Sobriety:     Status: No substance use     Drug Screen: obtained    Alleviating factors/Other Therapies Tried:    Contact with sponsor has been: regular    Family and support system has been: helpful    Patient has been going to recovery meetings: regularly    Recovery program has been : active    Patient has been participating in professional counseling /therapy: NO    Patient is following with psychiatry: YES    Patient has established PCP: NO        Social History     Social History Narrative    Most recently living in car and some with mother.   Court recent for  disorderly conduct and Hx of DUI few months ago (just need to take class to clear).          3 children  (8yr, 5yr and 2yr.)  Youngest lives here.  Two oldest live in [facility].         Patient Active Problem List    Diagnosis Date Noted     Chemical dependency (H) 07/10/2018     Priority: Medium     Opioid withdrawal (H) 07/07/2018     Priority: Medium     Suicidal ideation 05/15/2017     Priority: Medium       Problem list and histories reviewed & adjusted, as indicated.  Additional history: as documented        Current Outpatient Prescriptions on File Prior to Visit:  Acetaminophen (TYLENOL PO) Take 325-650 mg by mouth every 4 hours as needed for mild pain or fever   alum & mag hydroxide-simethicone (MYLANTA/MAALOX) 200-200-20 MG/5ML SUSP suspension Take 30 mLs by mouth every 6 hours as needed for indigestion   buprenorphine-naloxone (SUBOXONE) 8-2 MG SUBL sublingual tablet One tab daily am and 1 tab daily q pm.   guaiFENesin (ROBITUSSIN) 20 mg/mL SOLN solution Take 10 mLs by mouth every 4 hours as needed for cough   IBUPROFEN PO Take 200-400 mg by mouth every 6 hours as needed for moderate pain   ketoconazole (NIZORAL) 2 % cream Apply topically 2 times daily   ketoconazole (NIZORAL) 2 % shampoo Apply to the affected area and wash off after 5 minutes.   loratadine (CLARITIN) 10 MG tablet Take 10 mg by mouth daily as needed for allergies   melatonin 3 MG CAPS Take 6 mg by mouth At Bedtime   MELATONIN PO Take 3 mg by mouth nightly as needed   mupirocin (BACTROBAN) 2 % ointment Place in nose 2 x day until sx resolve   phenol-menthol (CEPASTAT) 14.5 MG lozenge Place 1 lozenge inside cheek every 2 hours as needed for moderate pain   senna-docusate (SENOKOT-S;PERICOLACE) 8.6-50 MG per tablet Take 2 tablets by mouth daily as needed for constipation     Current Facility-Administered Medications on File Prior to Visit:  Self Administer Medications: Behavioral Services        No Known Allergies      REVIEW OF  SYSTEMS:  General: No acute withdrawal symptoms.  No recent infections or fever  Resp: No coughing, wheezing or shortness of breath  CV: No chest pains or palpitations  GI: No nausea, vomiting, abdominal pain, diarrhea, constipation  : No urinary frequency or dysuria,     Musculoskeletal: No significant muscle or joint pains, No edema  Neurologic: No numbness, tingling, weakness, problems with balance or coordination  Psychiatric: No acute concerns  Skin: No rashes    OBJECTIVE:    PHYSICAL EXAM:  There were no vitals taken for this visit.    GENERAL APPEARANCE:  alert, comfortable appearing  EYES:Eyes grossly normal to inspection  NEURO:  Gait normal.  No tremor. Coordination intact.   MENTAL STATUS EXAM:  Appearance/Behavior: No appearant distress  Speech: Normal  Mood/Affect: normal affect  Insight: Adequate      Results for orders placed or performed in visit on 08/09/18   Urine Drugs of Abuse Screen Panel 13   Result Value Ref Range    Cannabinoids (32-ypt-9-carboxy-9-THC) Not Detected NDET^Not Detected ng/mL    Phencyclidine (Phencyclidine) Not Detected NDET^Not Detected ng/mL    Cocaine (Benzoylecgonine) Not Detected NDET^Not Detected ng/mL    Methamphetamine (d-Methamphetamine) Not Detected NDET^Not Detected ng/mL    Opiates (Morphine) Not Detected NDET^Not Detected ng/mL    Amphetamine (d-Amphetamine) Not Detected NDET^Not Detected ng/mL    Benzodiazepines (Nordiazepam) Not Detected NDET^Not Detected ng/mL    Tricyclic Antidepressants (Desipramine) Not Detected NDET^Not Detected ng/mL    Methadone (Methadone) Not Detected NDET^Not Detected ng/mL    Barbiturates (Butalbital) Not Detected NDET^Not Detected ng/mL    Oxycodone (Oxycodone) Not Detected NDET^Not Detected ng/mL    Propoxyphene (Norpropoxyphene) Not Detected NDET^Not Detected ng/mL    Buprenorphine (Buprenorphine) Detected, Abnormal Result (A) NDET^Not Detected ng/mL           ASSESSMENT/PLAN:  F11.23) Opioid withdrawal (H)  (primary encounter  diagnosis)      Plan: buprenorphine-naloxone (SUBOXONE) 8-2 MG SUBL         sublingual tablet       Continue 8mg q am and 8mg q pm.   Follow up 4 wk.   Continue current treatment and plans for aftercare.       Suboxone risk/benefit/side effect and intended purposes reviewed.        Opioid warning reviewed.  Risk of overdose following a period of abstinence due to decrease tolerance was discussed including risk of death.   Risk of overdose if using Suboxone with other substances particuarly benzodiazepines/alcohol was reviewed.       Counseled the patient on the importance of having a recovery program in addition to Medication assisted treatment.  Components include having some type of sober network, avoiding isolating, having willingness  to change, avoiding triggers and managing cravings.    Encouraged other services such as counseling, 12 step or other self-help organizations.        Strongly recommended abstain from alcohol, benzodiazepines, THC, opioids and other drugs of abuse.  Increased risk of relapse for opioids with use of these substances discussed.  Increased risk of overdose/death with use of other substances particularly benzodiazepines/alcohol reviewed.      Refer to higher level of services as needed      Naloxone offered.  Patient has RX.       Patient advised of office protocols for refills/appointments.            (F33.9) Episode of recurrent major depressive disorder, unspecified depression episode severity (H)  Plan: will need follow up PCP. Monitor for need for  medications.   Encouraged mental health counseling          (Z87.891) Former smoker  Plan: ecourage abstinence.            ENCOUNTER FOR LONG TERM USE OF HIGH RISK MEDICATION   High Risk Drug Monitoring?  YES   Drug being monitored: Buprenorphine   Reason for drug: Opioid Use Disorder   What is being monitored?: Dosage, Cravings, Trigger, side effects, and continued abstinence.      Opioid warning reviewed.  Risk of overdose following a  period of abstinence due to decrease tolerance was discussed including risk of death.   Risk of overdose if using Suboxone with other substances particuarly benzodiazepines/alcohol was reviewed.          Sera Bardales MD  [facility]  [phone]

## 2018-09-06 ENCOUNTER — OFFICE VISIT (OUTPATIENT)
Dept: ADDICTION MEDICINE | Facility: CLINIC | Age: 28
End: 2018-09-06
Payer: COMMERCIAL

## 2018-09-06 VITALS
DIASTOLIC BLOOD PRESSURE: 78 MMHG | OXYGEN SATURATION: 100 % | SYSTOLIC BLOOD PRESSURE: 100 MMHG | WEIGHT: 165 LBS | BODY MASS INDEX: 21.18 KG/M2 | RESPIRATION RATE: 18 BRPM | TEMPERATURE: 98.7 F | HEART RATE: 78 BPM

## 2018-09-06 DIAGNOSIS — Z79.899 HIGH RISK MEDICATION USE: ICD-10-CM

## 2018-09-06 DIAGNOSIS — K59.03 DRUG-INDUCED CONSTIPATION: Primary | ICD-10-CM

## 2018-09-06 DIAGNOSIS — Z87.891 FORMER SMOKER: ICD-10-CM

## 2018-09-06 DIAGNOSIS — F11.93 OPIOID WITHDRAWAL (H): ICD-10-CM

## 2018-09-06 DIAGNOSIS — F33.9 EPISODE OF RECURRENT MAJOR DEPRESSIVE DISORDER, UNSPECIFIED DEPRESSION EPISODE SEVERITY (H): ICD-10-CM

## 2018-09-06 PROCEDURE — 80306 DRUG TEST PRSMV INSTRMNT: CPT | Performed by: PEDIATRICS

## 2018-09-06 PROCEDURE — 99214 OFFICE O/P EST MOD 30 MIN: CPT | Performed by: PEDIATRICS

## 2018-09-06 RX ORDER — POLYETHYLENE GLYCOL 3350 17 G/17G
1 POWDER, FOR SOLUTION ORAL DAILY
Qty: 510 G | Refills: 1 | Status: SHIPPED | OUTPATIENT
Start: 2018-09-06

## 2018-09-06 RX ORDER — BUPRENORPHINE HYDROCHLORIDE AND NALOXONE HYDROCHLORIDE DIHYDRATE 8; 2 MG/1; MG/1
TABLET SUBLINGUAL
Qty: 60 EACH | Refills: 0 | Status: SHIPPED | OUTPATIENT
Start: 2018-09-06 | End: 2018-10-04

## 2018-09-06 NOTE — MR AVS SNAPSHOT
After Visit Summary   9/6/2018    Uri Valladares    MRN: [id number]           Patient Information     Date Of Birth          1990        Visit Information        Provider Department      9/6/2018 2:00 PM Sera Bardales MD [facility]        Today's Diagnoses     Drug-induced constipation    -  1    Opioid withdrawal (H)        Episode of recurrent major depressive disorder, unspecified depression episode severity (H)        Former smoker        High risk medication use           Follow-ups after your visit        Your next 10 appointments already scheduled     Oct 04, 2018  2:20 PM CDT   Return Visit with Sera Bardales MD   [facility] ([facility])    [address] [facility]  [address]  [facility] [address] [phone]              Who to contact     If you have questions or need follow up information about today's clinic visit or your schedule please contact [facility] directly at [phone].  Normal or non-critical lab and imaging results will be communicated to you by MyChart, letter or phone within 4 business days after the clinic has received the results. If you do not hear from us within 7 days, please contact the clinic through MyChart or phone. If you have a critical or abnormal lab result, we will notify you by phone as soon as possible.  Submit refill requests through [facility] or call your pharmacy and they will forward the refill request to us. Please allow 3 business days for your refill to be completed.          Additional Information About Your Visit        Care EveryWhere ID     This is your Care EveryWhere ID. This could be used by other organizations to access your [facility] medical records  [id number]        Your Vitals Were     Pulse Temperature Respirations Pulse Oximetry BMI (Body Mass Index)       78 98.7  F (37.1  C) (Oral) 18 100% 21.18 kg/m2         Blood Pressure from Last 3 Encounters:   09/06/18 100/78   08/09/18 128/74   07/26/18 118/62    Weight from Last 3 Encounters:   09/06/18 165 lb (74.8 kg)   08/09/18 166 lb (75.3 kg)   07/26/18 165 lb (74.8 kg)              We Performed the Following     Urine Drugs of Abuse Screen Panel 13          Today's Medication Changes          These changes are accurate as of 9/6/18  3:15 PM.  If you have any questions, ask your nurse or doctor.               Start taking these medicines.        Dose/Directions    polyethylene glycol powder   Commonly known as:  MIRALAX   Used for:  Drug-induced constipation   Started by:  Sera Bardales MD        Dose:  1 capful   Take 17 g (1 capful) by mouth daily   Quantity:  510 g   Refills:  1         Stop taking these medicines if you haven't already. Please contact your care team if you have questions.     alum & mag hydroxide-simethicone 200-200-20 MG/5ML Susp suspension   Commonly known as:  MYLANTA/MAALOX   Stopped by:  Sera Bardales MD           guaiFENesin 20 mg/mL Soln solution   Commonly known as:  ROBITUSSIN   Stopped by:  Sera Bardales MD           IBUPROFEN PO   Stopped by:  Sera Bardales MD           TYLENOL PO   Stopped by:  Sera Bardales MD                Where to get your medicines      These medications were sent to [facility]/pharmacy #[id number] - [place], [place] - [address], [place] [address]     Phone:  [phone]     polyethylene glycol powder         Some of these will need a paper prescription and others can be bought over the counter.  Ask your nurse if you have questions.     Bring a paper prescription for each of these medications     buprenorphine-naloxone 8-2 MG Subl sublingual tablet                Primary Care Provider Fax #    Physician No Ref-Primary [phone]       No address on file        Equal Access to Services     NEAL GARCIA : maricel Roesnbaum,  jt [address] shaquillebear osman ah. So [facility] [phone].    ATENCIÓN: Si faiza vernon, tiene a dutta disposición servicios gratuitos de asistencia lingüística. [place] al [phone].    We comply with applicable federal civil rights laws and [place] laws. We do not discriminate on the basis of race, color, national origin, age, disability, sex, sexual orientation, or gender identity.            Thank you!     Thank you for choosing [facility]  for your care. Our goal is always to provide you with excellent care. Hearing back from our patients is one way we can continue to improve our services. Please take a few minutes to complete the written survey that you may receive in the mail after your visit with us. Thank you!             Your Updated Medication List - Protect others around you: Learn how to safely use, store and throw away your medicines at www.disposemymeds.org.          This list is accurate as of 9/6/18  3:15 PM.  Always use your most recent med list.                   Brand Name Dispense Instructions for use Diagnosis    buprenorphine-naloxone 8-2 MG Subl sublingual tablet    SUBOXONE    60 each    One tab daily am and 1 tab daily q pm.    Opioid withdrawal (H)       * ketoconazole 2 % cream    NIZORAL    60 g    Apply topically 2 times daily    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       * ketoconazole 2 % shampoo    NIZORAL    120 mL    Apply to the affected area and wash off after 5 minutes.    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       loratadine 10 MG tablet    CLARITIN     Take 10 mg by mouth daily as needed for allergies        melatonin 3 MG Caps     60 capsule    Take 6 mg by mouth At Bedtime    Opioid withdrawal (H)       MELATONIN PO      Take 3 mg by mouth nightly as needed        mupirocin 2 % ointment    BACTROBAN    22 g    Place in nose 2 x day until sx resolve    Skin irritation       nicotine 7 MG/24HR 24 hr  patch    NICODERM CQ    30 patch    Place 1 patch onto the skin every 24 hours    Former smoker       nicotine polacrilex 2 MG lozenge    NICOTINE MINI    360 lozenge    Place 1 lozenge (2 mg) inside cheek every hour as needed for smoking cessation    Former smoker       phenol-menthol 14.5 MG lozenge      Place 1 lozenge inside cheek every 2 hours as needed for moderate pain        polyethylene glycol powder    MIRALAX    510 g    Take 17 g (1 capful) by mouth daily    Drug-induced constipation       senna-docusate 8.6-50 MG per tablet    SENOKOT-S;PERICOLACE     Take 2 tablets by mouth daily as needed for constipation        * Notice:  This list has 2 medication(s) that are the same as other medications prescribed for you. Read the directions carefully, and ask your doctor or other care provider to review them with you.

## 2018-09-06 NOTE — PROGRESS NOTES
SUBJECTIVE:                                                    BUPRENORPHINE FOLLOW UP:    Uri Valladares is a 28 year old male who presents to clinic today for follow up of Buprenorphine.      Date of last visit:  8/9/2018    [place] Board of Pharmacy Data Base Reviewed:    YES;     8/9/18 Suboxone 8mg tab #60      Brief History:   Initial visit 7/19/18  Detox 7/7/18-7/10/18  Using opioid off and on for 10 yr.   More recently tolerance increasing to the poin of Percocet/Fentanyl  up to 90-100mcg/day snorting.  No IV use.  Moderated hx of alcohol use.  Quit smoking in detox.  Hx of depression.            HPI:    9/6/2018    Finished  [facility] program. Now sober living and outpatient treatment.   Current Suboxone 8mg bid.  Feeling a little fatigue.  Otherwise no side effects.  No current craving.    Has cut down smoking some (about 1/2 ppd).  Seems to be working well.      Is doing a lot of meditation.  Finding this very helpful.   Is having constipation with hard stool about once /wk.      Eventually would like to wean from Suboxone.         Status since last visit: Since last visit patient has been:stable    Intensity:     There has been: no craving    Suboxone Dose: adequate    Progression of Symptoms/Precipitating Factors:     Cues to use and relapse triggers:None    Trigger have been:  mild    Accompanying Signs & Symptoms:    Side Effects: none    Sobriety:     Status: No substance use     Drug Screen: obtained    Alleviating factors/Other Therapies Tried:    Contact with sponsor has been: currently in treatment    Family and support system has been: helpful    Patient has been going to recovery meetings: currently in treatment     Recovery program has been : active    Patient has been participating in professional counseling /therapy: NO    Patient is following with psychiatry: NO    Patient has established PCP: YES        Social History     Social History Narrative    Most recently living in car  and some with mother.   Court recent for disorderly conduct and Hx of DUI few months ago (just need to take class to clear).          3 children  (8yr, 5yr and 2yr.)  Youngest lives here.  Two oldest live in [facility].         Patient Active Problem List    Diagnosis Date Noted     Chemical dependency (H) 07/10/2018     Priority: Medium     Opioid withdrawal (H) 07/07/2018     Priority: Medium     Suicidal ideation 05/15/2017     Priority: Medium       Problem list and histories reviewed & adjusted, as indicated.  Additional history: as documented        Current Outpatient Prescriptions on File Prior to Visit:  Acetaminophen (TYLENOL PO) Take 325-650 mg by mouth every 4 hours as needed for mild pain or fever   alum & mag hydroxide-simethicone (MYLANTA/MAALOX) 200-200-20 MG/5ML SUSP suspension Take 30 mLs by mouth every 6 hours as needed for indigestion   buprenorphine-naloxone (SUBOXONE) 8-2 MG SUBL sublingual tablet One tab daily am and 1 tab daily q pm.   guaiFENesin (ROBITUSSIN) 20 mg/mL SOLN solution Take 10 mLs by mouth every 4 hours as needed for cough   IBUPROFEN PO Take 200-400 mg by mouth every 6 hours as needed for moderate pain   ketoconazole (NIZORAL) 2 % cream Apply topically 2 times daily   ketoconazole (NIZORAL) 2 % shampoo Apply to the affected area and wash off after 5 minutes.   loratadine (CLARITIN) 10 MG tablet Take 10 mg by mouth daily as needed for allergies   melatonin 3 MG CAPS Take 6 mg by mouth At Bedtime   MELATONIN PO Take 3 mg by mouth nightly as needed   mupirocin (BACTROBAN) 2 % ointment Place in nose 2 x day until sx resolve   nicotine (NICODERM CQ) 7 MG/24HR 24 hr patch Place 1 patch onto the skin every 24 hours   nicotine polacrilex (NICOTINE MINI) 2 MG lozenge Place 1 lozenge (2 mg) inside cheek every hour as needed for smoking cessation   phenol-menthol (CEPASTAT) 14.5 MG lozenge Place 1 lozenge inside cheek every 2 hours as needed for moderate pain   senna-docusate  (SENOKOT-S;PERICOLACE) 8.6-50 MG per tablet Take 2 tablets by mouth daily as needed for constipation     Current Facility-Administered Medications on File Prior to Visit:  Self Administer Medications: Behavioral Services       No Known Allergies      REVIEW OF SYSTEMS:  General: No acute withdrawal symptoms.  No recent infections or fever  Resp: No coughing, wheezing or shortness of breath  CV: No chest pains or palpitations  GI: No nausea, vomiting, abdominal pain, diarrhea, constipation  : No urinary frequency or dysuria,     Musculoskeletal: No significant muscle or joint pains, No edema  Neurologic: No numbness, tingling, weakness, problems with balance or coordination  Psychiatric: No acute concerns  Skin: No rashes    OBJECTIVE:    PHYSICAL EXAM:  /78  Pulse 78  Temp 98.7  F (37.1  C) (Oral)  Resp 18  Wt 165 lb (74.8 kg)  SpO2 100%  BMI 21.18 kg/m2    GENERAL APPEARANCE:  alert, comfortable appearing  EYES:Eyes grossly normal to inspection  NEURO:  Gait normal.  No tremor. Coordination intact.   MENTAL STATUS EXAM:  Appearance/Behavior: No appearant distress  Speech: Normal  Mood/Affect: normal affect  Insight: Adequate      Results for orders placed or performed in visit on 09/06/18   Urine Drugs of Abuse Screen Panel 13   Result Value Ref Range    Cannabinoids (95-iuw-5-carboxy-9-THC) Not Detected NDET^Not Detected ng/mL    Phencyclidine (Phencyclidine) Not Detected NDET^Not Detected ng/mL    Cocaine (Benzoylecgonine) Not Detected NDET^Not Detected ng/mL    Methamphetamine (d-Methamphetamine) Not Detected NDET^Not Detected ng/mL    Opiates (Morphine) Not Detected NDET^Not Detected ng/mL    Amphetamine (d-Amphetamine) Not Detected NDET^Not Detected ng/mL    Benzodiazepines (Nordiazepam) Not Detected NDET^Not Detected ng/mL    Tricyclic Antidepressants (Desipramine) Not Detected NDET^Not Detected ng/mL    Methadone (Methadone) Not Detected NDET^Not Detected ng/mL    Barbiturates (Butalbital) Not  Detected NDET^Not Detected ng/mL    Oxycodone (Oxycodone) Not Detected NDET^Not Detected ng/mL    Propoxyphene (Norpropoxyphene) Not Detected NDET^Not Detected ng/mL    Buprenorphine (Buprenorphine) Detected, Abnormal Result (A) NDET^Not Detected ng/mL           ASSESSMENT/PLAN:    F11.23) Opioid withdrawal (H)  (primary encounter diagnosis)      Plan: buprenorphine-naloxone (SUBOXONE) 8-2 MG SUBL         sublingual tablet       Continue 8mg q am and 8mg q pm.   Follow up 4 wk.   Continue current treatment and plans for aftercare.       Suboxone risk/benefit/side effect and intended purposes reviewed.        Opioid warning reviewed.  Risk of overdose following a period of abstinence due to decrease tolerance was discussed including risk of death.   Risk of overdose if using Suboxone with other substances particuarly benzodiazepines/alcohol was reviewed.       Counseled the patient on the importance of having a recovery program in addition to Medication assisted treatment.  Components include having some type of sober network, avoiding isolating, having willingness  to change, avoiding triggers and managing cravings.    Encouraged other services such as counseling, 12 step or other self-help organizations.        Strongly recommended abstain from alcohol, benzodiazepines, THC, opioids and other drugs of abuse.  Increased risk of relapse for opioids with use of these substances discussed.  Increased risk of overdose/death with use of other substances particularly benzodiazepines/alcohol reviewed.      Refer to higher level of services as needed      Naloxone offered.  Patient has RX.       Patient advised of office protocols for refills/appointments.            (F33.9) Episode of recurrent major depressive disorder, unspecified depression episode severity (H)  Plan: will need follow up PCP. Monitor for need for  medications.   Encouraged mental health counseling      (K59.03)  Constipation   miralax 17gm daily.   Titrate amount as needed.  High fiber diet, increase fluid intake.  Follow up prn.       (Z87.896) Former smoker  Plan: ecourage abstinence.         ENCOUNTER FOR LONG TERM USE OF HIGH RISK MEDICATION   High Risk Drug Monitoring?  YES   Drug being monitored: Buprenorphine   Reason for drug: Opioid Use Disorder   What is being monitored?: Dosage, Cravings, Trigger, side effects, and continued abstinence.      Opioid warning reviewed.  Risk of overdose following a period of abstinence due to decrease tolerance was discussed including risk of death.   Risk of overdose if using Suboxone with other substances particuarly benzodiazepines/alcohol was reviewed.          Sera Bardales MD  [facility]  [phone]

## 2018-10-04 ENCOUNTER — OFFICE VISIT (OUTPATIENT)
Dept: ADDICTION MEDICINE | Facility: CLINIC | Age: 28
End: 2018-10-04
Payer: COMMERCIAL

## 2018-10-04 VITALS
TEMPERATURE: 98.5 F | WEIGHT: 170 LBS | BODY MASS INDEX: 21.83 KG/M2 | SYSTOLIC BLOOD PRESSURE: 112 MMHG | HEART RATE: 72 BPM | OXYGEN SATURATION: 100 % | DIASTOLIC BLOOD PRESSURE: 74 MMHG | RESPIRATION RATE: 14 BRPM

## 2018-10-04 DIAGNOSIS — Z87.891 FORMER SMOKER: ICD-10-CM

## 2018-10-04 DIAGNOSIS — F33.9 EPISODE OF RECURRENT MAJOR DEPRESSIVE DISORDER, UNSPECIFIED DEPRESSION EPISODE SEVERITY (H): ICD-10-CM

## 2018-10-04 DIAGNOSIS — F11.93 OPIOID WITHDRAWAL (H): Primary | ICD-10-CM

## 2018-10-04 DIAGNOSIS — Z79.899 HIGH RISK MEDICATION USE: ICD-10-CM

## 2018-10-04 DIAGNOSIS — K59.03 DRUG-INDUCED CONSTIPATION: ICD-10-CM

## 2018-10-04 PROCEDURE — 80306 DRUG TEST PRSMV INSTRMNT: CPT | Performed by: PEDIATRICS

## 2018-10-04 PROCEDURE — 99215 OFFICE O/P EST HI 40 MIN: CPT | Performed by: PEDIATRICS

## 2018-10-04 RX ORDER — BUPRENORPHINE HYDROCHLORIDE AND NALOXONE HYDROCHLORIDE DIHYDRATE 8; 2 MG/1; MG/1
TABLET SUBLINGUAL
Qty: 60 EACH | Refills: 0 | Status: SHIPPED | OUTPATIENT
Start: 2018-10-04 | End: 2018-11-07

## 2018-10-04 NOTE — PROGRESS NOTES
SUBJECTIVE:                                                    BUPRENORPHINE FOLLOW UP:    Uri Valladares is a 28 year old male who presents to clinic today for follow up of Buprenorphine.      Date of last visit:  9/6/2018    [place] Board of Pharmacy Data Base Reviewed:    YES;     9/6/18 Suboxone 8mg tab #60    Brief History:   Initial visit 7/19/18  Detox 7/7/18-7/10/18  Using opioid off and on for 10 yr.   More recently tolerance increasing to the poin of Percocet/Fentanyl  up to 90-100mcg/day snorting.  No IV use.  Moderated hx of alcohol use.  Quit smoking in detox.  Hx of depression.               HPI:     10/4/2018  Still [facility] outpatient and working on /off [facility]  Living sober house.    Suboxone 8mg bid.  doing well.     No craving or recent use.   Smoking about 1/2 ppd.    Continues with meditation practice.  Constipation improved.          Status since last visit: Since last visit patient has been:doing well    Intensity:     There has been: mild intermittent craving    Suboxone Dose: adequate    Progression of Symptoms/Precipitating Factors:     Cues to use and relapse triggers:None    Trigger have been:  mild    Accompanying Signs & Symptoms:    Side Effects: none    Sobriety:     Status: No substance use     Drug Screen: obtained    Alleviating factors/Other Therapies Tried:    Contact with sponsor has been: regular    Family and support system has been: helpful    Patient has been going to recovery meetings: regular    Recovery program has been : active    Patient has been participating in professional counseling /therapy: NO    Patient is following with psychiatry: NO    Patient has established PCP: NO        Social History     Social History Narrative    Most recently living in car and some with mother.   Court recent for disorderly conduct and Hx of DUI few months ago (just need to take class to clear).          3 children  (8yr, 5yr and 2yr.)  Youngest lives here.  Two oldest live  in [place] area.         Patient Active Problem List    Diagnosis Date Noted     Chemical dependency (H) 07/10/2018     Priority: Medium     Opioid withdrawal (H) 07/07/2018     Priority: Medium     Suicidal ideation 05/15/2017     Priority: Medium       Problem list and histories reviewed & adjusted, as indicated.  Additional history: as documented        Current Outpatient Prescriptions on File Prior to Visit:  buprenorphine-naloxone (SUBOXONE) 8-2 MG SUBL sublingual tablet One tab daily am and 1 tab daily q pm.   ketoconazole (NIZORAL) 2 % cream Apply topically 2 times daily   ketoconazole (NIZORAL) 2 % shampoo Apply to the affected area and wash off after 5 minutes.   loratadine (CLARITIN) 10 MG tablet Take 10 mg by mouth daily as needed for allergies   melatonin 3 MG CAPS Take 6 mg by mouth At Bedtime   MELATONIN PO Take 3 mg by mouth nightly as needed   mupirocin (BACTROBAN) 2 % ointment Place in nose 2 x day until sx resolve   nicotine (NICODERM CQ) 7 MG/24HR 24 hr patch Place 1 patch onto the skin every 24 hours   nicotine polacrilex (NICOTINE MINI) 2 MG lozenge Place 1 lozenge (2 mg) inside cheek every hour as needed for smoking cessation   phenol-menthol (CEPASTAT) 14.5 MG lozenge Place 1 lozenge inside cheek every 2 hours as needed for moderate pain   polyethylene glycol (MIRALAX) powder Take 17 g (1 capful) by mouth daily   senna-docusate (SENOKOT-S;PERICOLACE) 8.6-50 MG per tablet Take 2 tablets by mouth daily as needed for constipation     Current Facility-Administered Medications on File Prior to Visit:  Self Administer Medications: Behavioral Services       No Known Allergies      REVIEW OF SYSTEMS:  General: No acute withdrawal symptoms.  No recent infections or fever  Resp: No coughing, wheezing or shortness of breath  CV: No chest pains or palpitations  GI: No nausea, vomiting, abdominal pain, diarrhea, constipation  : No urinary frequency or dysuria,     Musculoskeletal: No significant muscle  or joint pains, No edema  Neurologic: No numbness, tingling, weakness, problems with balance or coordination  Psychiatric: No acute concerns  Skin: No rashes    OBJECTIVE:    PHYSICAL EXAM:  /74 (BP Location: Left arm)  Pulse 72  Temp 98.5  F (36.9  C) (Oral)  Resp 14  Wt 170 lb (77.1 kg)  SpO2 100%  BMI 21.83 kg/m2    GENERAL APPEARANCE:  alert, comfortable appearing  EYES:Eyes grossly normal to inspection  NEURO:  Gait normal.  No tremor. Coordination intact.   MENTAL STATUS EXAM:  Appearance/Behavior: No appearant distress  Speech: Normal  Mood/Affect: normal affect  Insight: Adequate      Results for orders placed or performed in visit on 10/04/18   Urine Drugs of Abuse Screen Panel 13   Result Value Ref Range    Cannabinoids (11-yiv-4-carboxy-9-THC) Not Detected NDET^Not Detected ng/mL    Phencyclidine (Phencyclidine) Not Detected NDET^Not Detected ng/mL    Cocaine (Benzoylecgonine) Not Detected NDET^Not Detected ng/mL    Methamphetamine (d-Methamphetamine) Not Detected NDET^Not Detected ng/mL    Opiates (Morphine) Not Detected NDET^Not Detected ng/mL    Amphetamine (d-Amphetamine) Not Detected NDET^Not Detected ng/mL    Benzodiazepines (Nordiazepam) Not Detected NDET^Not Detected ng/mL    Tricyclic Antidepressants (Desipramine) Not Detected NDET^Not Detected ng/mL    Methadone (Methadone) Not Detected NDET^Not Detected ng/mL    Barbiturates (Butalbital) Not Detected NDET^Not Detected ng/mL    Oxycodone (Oxycodone) Not Detected NDET^Not Detected ng/mL    Propoxyphene (Norpropoxyphene) Not Detected NDET^Not Detected ng/mL    Buprenorphine (Buprenorphine) Detected, Abnormal Result (A) NDET^Not Detected ng/mL           ASSESSMENT/PLAN:    F11.23) Opioid withdrawal (H)  (primary encounter diagnosis)      Plan: buprenorphine-naloxone (SUBOXONE) 8-2 MG SUBL         sublingual tablet       Continue 8mg q am and 8mg q pm.   Follow up 4 wk.   Continue current treatment and plans for aftercare.       Suboxone  risk/benefit/side effect and intended purposes reviewed.        Opioid warning reviewed.  Risk of overdose following a period of abstinence due to decrease tolerance was discussed including risk of death.   Risk of overdose if using Suboxone with other substances particuarly benzodiazepines/alcohol was reviewed.       Counseled the patient on the importance of having a recovery program in addition to Medication assisted treatment.  Components include having some type of sober network, avoiding isolating, having willingness  to change, avoiding triggers and managing cravings.    Encouraged other services such as counseling, 12 step or other self-help organizations.        Strongly recommended abstain from alcohol, benzodiazepines, THC, opioids and other drugs of abuse.  Increased risk of relapse for opioids with use of these substances discussed.  Increased risk of overdose/death with use of other substances particularly benzodiazepines/alcohol reviewed.      Refer to higher level of services as needed      Naloxone offered.  Patient has RX.       Patient advised of office protocols for refills/appointments.            (F33.9) Episode of recurrent major depressive disorder, unspecified depression episode severity (H)  Plan: will need follow up PCP. Monitor for need for  medications.   Encouraged mental health counseling      (K59.03)  Constipation   miralax 17gm daily.  Titrate amount as needed.  High fiber diet, increase fluid intake.  Follow up prn.       (Z87.891) Former smoker  Plan: ecourage abstinence.          ENCOUNTER FOR LONG TERM USE OF HIGH RISK MEDICATION   High Risk Drug Monitoring?  YES   Drug being monitored: Buprenorphine   Reason for drug: Opioid Use Disorder   What is being monitored?: Dosage, Cravings, Trigger, side effects, and continued abstinence.      Opioid warning reviewed.  Risk of overdose following a period of abstinence due to decrease tolerance was discussed including risk of death.    Risk of overdose if using Suboxone with other substances particuarly benzodiazepines/alcohol was reviewed.          Sera Bardales MD  [facility] Addiction [facility]  [phone]

## 2018-11-07 ENCOUNTER — OFFICE VISIT (OUTPATIENT)
Dept: ADDICTION MEDICINE | Facility: CLINIC | Age: 28
End: 2018-11-07
Payer: COMMERCIAL

## 2018-11-07 VITALS
RESPIRATION RATE: 18 BRPM | OXYGEN SATURATION: 100 % | WEIGHT: 165.5 LBS | BODY MASS INDEX: 21.25 KG/M2 | SYSTOLIC BLOOD PRESSURE: 116 MMHG | DIASTOLIC BLOOD PRESSURE: 68 MMHG | HEART RATE: 79 BPM

## 2018-11-07 DIAGNOSIS — F11.93 OPIOID WITHDRAWAL (H): ICD-10-CM

## 2018-11-07 DIAGNOSIS — K59.03 DRUG-INDUCED CONSTIPATION: ICD-10-CM

## 2018-11-07 DIAGNOSIS — F33.9 EPISODE OF RECURRENT MAJOR DEPRESSIVE DISORDER, UNSPECIFIED DEPRESSION EPISODE SEVERITY (H): Primary | ICD-10-CM

## 2018-11-07 DIAGNOSIS — Z87.891 FORMER SMOKER: ICD-10-CM

## 2018-11-07 DIAGNOSIS — Z79.899 HIGH RISK MEDICATION USE: ICD-10-CM

## 2018-11-07 PROCEDURE — 80306 DRUG TEST PRSMV INSTRMNT: CPT | Performed by: PEDIATRICS

## 2018-11-07 PROCEDURE — 99214 OFFICE O/P EST MOD 30 MIN: CPT | Performed by: PEDIATRICS

## 2018-11-07 RX ORDER — BUPRENORPHINE HYDROCHLORIDE AND NALOXONE HYDROCHLORIDE DIHYDRATE 8; 2 MG/1; MG/1
TABLET SUBLINGUAL
Qty: 60 EACH | Refills: 0 | Status: SHIPPED | OUTPATIENT
Start: 2018-11-07 | End: 2018-12-05

## 2018-11-07 NOTE — MR AVS SNAPSHOT
After Visit Summary   11/7/2018    Uri Valladares    MRN: [id number]           Patient Information     Date Of Birth          1990        Visit Information        Provider Department      11/7/2018 3:40 PM Sera Bardales MD [facility] [place]        Today's Diagnoses     Episode of recurrent major depressive disorder, unspecified depression episode severity (H)    -  1    Drug-induced constipation        Former smoker        High risk medication use        Opioid withdrawal (H)           Follow-ups after your visit        Your next 10 appointments already scheduled     Dec 05, 2018  3:40 PM CST   Return Visit with Sera Bardales MD   [facility] [place] ([facility] [place])    [address]  [address]  [facility] [address] [phone]              Who to contact     If you have questions or need follow up information about today's clinic visit or your schedule please contact [facility] [place] directly at [phone].  Normal or non-critical lab and imaging results will be communicated to you by MyChart, letter or phone within 4 business days after the clinic has received the results. If you do not hear from us within 7 days, please contact the clinic through MyChart or phone. If you have a critical or abnormal lab result, we will notify you by phone as soon as possible.  Submit refill requests through [facility] or call your pharmacy and they will forward the refill request to us. Please allow 3 business days for your refill to be completed.          Additional Information About Your Visit        Care EveryWhere ID     This is your Care EveryWhere ID. This could be used by other organizations to access your [facility] medical records  [id number]        Your Vitals Were     Pulse Respirations Pulse Oximetry BMI (Body Mass Index)          79 18 100% 21.25 kg/m2         Blood Pressure from Last  3 Encounters:   11/07/18 116/68   10/04/18 112/74   09/06/18 100/78    Weight from Last 3 Encounters:   11/07/18 165 lb 8 oz (75.1 kg)   10/04/18 170 lb (77.1 kg)   09/06/18 165 lb (74.8 kg)              We Performed the Following     Urine Drugs of Abuse Screen Panel 13          Where to get your medicines      Some of these will need a paper prescription and others can be bought over the counter.  Ask your nurse if you have questions.     Bring a paper prescription for each of these medications     buprenorphine-naloxone 8-2 MG Subl sublingual tablet          Primary Care Provider Fax #    Physician No Ref-Primary [phone]       No address on file        Equal Access to Services     NEAL GARCIA : Reynaldo Kirk, maricel york, jt anguiano, bear salgado. So [facility] [phone].    ATENCIÓN: Si habla español, tiene a dutta disposición servicios gratuitos de asistencia lingüística. Llame al [phone].    We comply with applicable federal civil rights laws and [place] laws. We do not discriminate on the basis of race, color, national origin, age, disability, sex, sexual orientation, or gender identity.            Thank you!     Thank you for choosing [facility] PRIMARY [facility]  for your care. Our goal is always to provide you with excellent care. Hearing back from our patients is one way we can continue to improve our services. Please take a few minutes to complete the written survey that you may receive in the mail after your visit with us. Thank you!             Your Updated Medication List - Protect others around you: Learn how to safely use, store and throw away your medicines at www.disposemymeds.org.          This list is accurate as of 11/7/18  4:38 PM.  Always use your most recent med list.                   Brand Name Dispense Instructions for use Diagnosis    buprenorphine-naloxone 8-2 MG Subl sublingual tablet    SUBOXONE    60  each    One tab daily am and 1 tab daily q pm.    Opioid withdrawal (H)       * ketoconazole 2 % cream    NIZORAL    60 g    Apply topically 2 times daily    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       * ketoconazole 2 % shampoo    NIZORAL    120 mL    Apply to the affected area and wash off after 5 minutes.    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       loratadine 10 MG tablet    CLARITIN     Take 10 mg by mouth daily as needed for allergies        melatonin 3 MG Caps     60 capsule    Take 6 mg by mouth At Bedtime    Opioid withdrawal (H)       MELATONIN PO      Take 3 mg by mouth nightly as needed        mupirocin 2 % ointment    BACTROBAN    22 g    Place in nose 2 x day until sx resolve    Skin irritation       nicotine 7 MG/24HR 24 hr patch    NICODERM CQ    30 patch    Place 1 patch onto the skin every 24 hours    Former smoker       nicotine polacrilex 2 MG lozenge    NICOTINE MINI    360 lozenge    Place 1 lozenge (2 mg) inside cheek every hour as needed for smoking cessation    Former smoker       phenol-menthol 14.5 MG lozenge      Place 1 lozenge inside cheek every 2 hours as needed for moderate pain        polyethylene glycol powder    MIRALAX    510 g    Take 17 g (1 capful) by mouth daily    Drug-induced constipation       senna-docusate 8.6-50 MG per tablet    SENOKOT-S;PERICOLACE     Take 2 tablets by mouth daily as needed for constipation        * Notice:  This list has 2 medication(s) that are the same as other medications prescribed for you. Read the directions carefully, and ask your doctor or other care provider to review them with you.

## 2018-11-07 NOTE — PROGRESS NOTES
SUBJECTIVE:                                                    BUPRENORPHINE FOLLOW UP:    Uri Valladares is a 28 year old male who presents to clinic today for follow up of Buprenorphine.      Date of last visit:  10/4/2018    [place] Board of Pharmacy Data Base Reviewed:    YES;     10/9/18 Suboxone 8mg tab #60    Brief History:   Initial visit 7/19/18  Detox 7/7/18-7/10/18  Using opioid off and on for 10 yr.   More recently tolerance increasing to the poin of Percocet/Fentanyl  up to 90-100mcg/day snorting.  No IV use.  Moderated hx of alcohol use.  Quit smoking in detox.  Hx of depression.           HPI:     11/7/2018  Suboxone 8mg bid   Still attending outpatient at [facility]/ sober living.   Concerns about weight.  Not a lot of appetite.  Eats about one meal a day.  Would like to be bigger.  Healthy diet habits discused.   Working () and event set up.  No triggers in working environment.    Did restart smoking about 1/2 ppd (had quit).  Feels it was stress triggered to return. Thinking about trying to cut back.   Was transitioning from treatment settings.       Wt Readings from Last 4 Encounters:   10/04/18 170 lb (77.1 kg)   09/06/18 165 lb (74.8 kg)   08/09/18 166 lb (75.3 kg)   07/26/18 165 lb (74.8 kg)         =       Status since last visit: Since last visit patient has been:stable    Intensity:     There has been: mild intermittent craving    Suboxone Dose: adequate    Progression of Symptoms/Precipitating Factors:     Cues to use and relapse triggers:Anxiety and Depression    Trigger have been:  mild    Accompanying Signs & Symptoms:    Side Effects: none    Sobriety:     Status: No substance use     Drug Screen: obtained    Alleviating factors/Other Therapies Tried:    Contact with sponsor has been: currently in treatment    Family and support system has been: neutral    Patient has been going to recovery meetings: currently in treatment     Recovery program has been : active    Patient has  been participating in professional counseling /therapy: NO    Patient is following with psychiatry: NO    Patient has established PCP: NO        Social History     Social History Narrative    Most recently living in car and some with mother.   Court recent for disorderly conduct and Hx of DUI few months ago (just need to take class to clear).          3 children  (8yr, 5yr and 2yr.)  Youngest lives here.  Two oldest live in [facility].         Patient Active Problem List    Diagnosis Date Noted     Chemical dependency (H) 07/10/2018     Priority: Medium     Opioid withdrawal (H) 07/07/2018     Priority: Medium     Suicidal ideation 05/15/2017     Priority: Medium       Problem list and histories reviewed & adjusted, as indicated.  Additional history: as documented        Current Outpatient Prescriptions on File Prior to Visit:  buprenorphine-naloxone (SUBOXONE) 8-2 MG SUBL sublingual tablet One tab daily am and 1 tab daily q pm.   ketoconazole (NIZORAL) 2 % cream Apply topically 2 times daily   ketoconazole (NIZORAL) 2 % shampoo Apply to the affected area and wash off after 5 minutes.   loratadine (CLARITIN) 10 MG tablet Take 10 mg by mouth daily as needed for allergies   melatonin 3 MG CAPS Take 6 mg by mouth At Bedtime   MELATONIN PO Take 3 mg by mouth nightly as needed   mupirocin (BACTROBAN) 2 % ointment Place in nose 2 x day until sx resolve   nicotine (NICODERM CQ) 7 MG/24HR 24 hr patch Place 1 patch onto the skin every 24 hours   nicotine polacrilex (NICOTINE MINI) 2 MG lozenge Place 1 lozenge (2 mg) inside cheek every hour as needed for smoking cessation   phenol-menthol (CEPASTAT) 14.5 MG lozenge Place 1 lozenge inside cheek every 2 hours as needed for moderate pain   polyethylene glycol (MIRALAX) powder Take 17 g (1 capful) by mouth daily   senna-docusate (SENOKOT-S;PERICOLACE) 8.6-50 MG per tablet Take 2 tablets by mouth daily as needed for constipation     Current Facility-Administered Medications on  File Prior to Visit:  Self Administer Medications: Behavioral Services       No Known Allergies      REVIEW OF SYSTEMS:  General: No acute withdrawal symptoms.  No recent infections or fever  Resp: No coughing, wheezing or shortness of breath  CV: No chest pains or palpitations  GI: No nausea, vomiting, abdominal pain, diarrhea, constipation  : No urinary frequency or dysuria,     Musculoskeletal: No significant muscle or joint pains, No edema  Neurologic: No numbness, tingling, weakness, problems with balance or coordination  Psychiatric: No acute concerns  Skin: No rashes    OBJECTIVE:    PHYSICAL EXAM:  /68  Pulse 79  Resp 18  Wt 165 lb 8 oz (75.1 kg)  SpO2 100%  BMI 21.25 kg/m2    GENERAL APPEARANCE:  alert, comfortable appearing  EYES:Eyes grossly normal to inspection  NEURO:  Gait normal.  No tremor. Coordination intact.   MENTAL STATUS EXAM:  Appearance/Behavior: No appearant distress  Speech: Normal  Mood/Affect: normal affect  Insight: Adequate      Results for orders placed or performed in visit on 10/04/18   Urine Drugs of Abuse Screen Panel 13   Result Value Ref Range    Cannabinoids (09-jon-4-carboxy-9-THC) Not Detected NDET^Not Detected ng/mL    Phencyclidine (Phencyclidine) Not Detected NDET^Not Detected ng/mL    Cocaine (Benzoylecgonine) Not Detected NDET^Not Detected ng/mL    Methamphetamine (d-Methamphetamine) Not Detected NDET^Not Detected ng/mL    Opiates (Morphine) Not Detected NDET^Not Detected ng/mL    Amphetamine (d-Amphetamine) Not Detected NDET^Not Detected ng/mL    Benzodiazepines (Nordiazepam) Not Detected NDET^Not Detected ng/mL    Tricyclic Antidepressants (Desipramine) Not Detected NDET^Not Detected ng/mL    Methadone (Methadone) Not Detected NDET^Not Detected ng/mL    Barbiturates (Butalbital) Not Detected NDET^Not Detected ng/mL    Oxycodone (Oxycodone) Not Detected NDET^Not Detected ng/mL    Propoxyphene (Norpropoxyphene) Not Detected NDET^Not Detected ng/mL     Buprenorphine (Buprenorphine) Detected, Abnormal Result (A) NDET^Not Detected ng/mL           ASSESSMENT/PLAN:      F11.23) Opioid withdrawal (H)  (primary encounter diagnosis)      Plan: buprenorphine-naloxone (SUBOXONE) 8-2 MG SUBL         sublingual tablet       Continue 8mg q am and 8mg q pm.   Follow up 4 wk.   Continue current treatment and plans for aftercare.       Suboxone risk/benefit/side effect and intended purposes reviewed.        Opioid warning reviewed.  Risk of overdose following a period of abstinence due to decrease tolerance was discussed including risk of death.   Risk of overdose if using Suboxone with other substances particuarly benzodiazepines/alcohol was reviewed.       Counseled the patient on the importance of having a recovery program in addition to Medication assisted treatment.  Components include having some type of sober network, avoiding isolating, having willingness  to change, avoiding triggers and managing cravings.    Encouraged other services such as counseling, 12 step or other self-help organizations.        Strongly recommended abstain from alcohol, benzodiazepines, THC, opioids and other drugs of abuse.  Increased risk of relapse for opioids with use of these substances discussed.  Increased risk of overdose/death with use of other substances particularly benzodiazepines/alcohol reviewed.            (F33.9) Episode of recurrent major depressive disorder, unspecified depression episode severity (H)  Plan: will need follow up PCP. Monitor for need for  medications.   Encouraged mental health counseling      (K59.03)  Constipation   miralax 17gm daily.  Titrate amount as needed.  High fiber diet, increase fluid intake.  Follow up prn.       (Z87.891) Nicotine use disorder.   Has started smoking again.   Plan: ecourage abstinence. Encouraged Nicotine Abstinence.  Increase risk of relapse with other substances with nicotine use discussed.    Risk of Ecig/Vaping also reviewed.     Other nicotine cessation such as lozenge, gum, patch reviewed.   Chantix also discussed. Risks, benefits, side effects and intended purposes discussed.  Other supports such as Quit plan reviewed.                ENCOUNTER FOR LONG TERM USE OF HIGH RISK MEDICATION   High Risk Drug Monitoring?  YES   Drug being monitored: Buprenorphine   Reason for drug: Opioid Use Disorder   What is being monitored?: Dosage, Cravings, Trigger, side effects, and continued abstinence.      Opioid warning reviewed.  Risk of overdose following a period of abstinence due to decrease tolerance was discussed including risk of death.   Risk of overdose if using Suboxone with other substances particuarly benzodiazepines/alcohol was reviewed.        Sera Bardales MD  [facility]  [phone]

## 2018-11-08 ENCOUNTER — TELEPHONE (OUTPATIENT)
Dept: ADDICTION MEDICINE | Facility: CLINIC | Age: 28
End: 2018-11-08

## 2018-11-08 NOTE — TELEPHONE ENCOUNTER
Pt called he saw Dr. Bardales yesterday, and his pharmacy didn't receive the Subx Rx. Please re-send Rx to [facility] in [place].   Tel:   [phone]    Pt contact info:  [phone]        Oscar Arzate

## 2018-12-05 ENCOUNTER — OFFICE VISIT (OUTPATIENT)
Dept: ADDICTION MEDICINE | Facility: CLINIC | Age: 28
End: 2018-12-05
Payer: COMMERCIAL

## 2018-12-05 VITALS
OXYGEN SATURATION: 99 % | SYSTOLIC BLOOD PRESSURE: 122 MMHG | WEIGHT: 165.5 LBS | RESPIRATION RATE: 16 BRPM | DIASTOLIC BLOOD PRESSURE: 74 MMHG | HEART RATE: 88 BPM | BODY MASS INDEX: 21.25 KG/M2

## 2018-12-05 DIAGNOSIS — Z79.899 HIGH RISK MEDICATION USE: ICD-10-CM

## 2018-12-05 DIAGNOSIS — K59.03 DRUG-INDUCED CONSTIPATION: ICD-10-CM

## 2018-12-05 DIAGNOSIS — Z87.891 FORMER SMOKER: ICD-10-CM

## 2018-12-05 DIAGNOSIS — F11.93 OPIOID WITHDRAWAL (H): ICD-10-CM

## 2018-12-05 DIAGNOSIS — F33.9 EPISODE OF RECURRENT MAJOR DEPRESSIVE DISORDER, UNSPECIFIED DEPRESSION EPISODE SEVERITY (H): ICD-10-CM

## 2018-12-05 DIAGNOSIS — F11.20 OPIOID USE DISORDER, SEVERE, DEPENDENCE (H): Primary | ICD-10-CM

## 2018-12-05 PROCEDURE — 80306 DRUG TEST PRSMV INSTRMNT: CPT | Performed by: PEDIATRICS

## 2018-12-05 PROCEDURE — 99215 OFFICE O/P EST HI 40 MIN: CPT | Performed by: PEDIATRICS

## 2018-12-05 RX ORDER — BUPRENORPHINE HYDROCHLORIDE AND NALOXONE HYDROCHLORIDE DIHYDRATE 8; 2 MG/1; MG/1
TABLET SUBLINGUAL
Qty: 60 EACH | Refills: 0 | Status: SHIPPED | OUTPATIENT
Start: 2018-12-05 | End: 2019-01-17

## 2018-12-05 NOTE — PROGRESS NOTES
SUBJECTIVE:                                                    BUPRENORPHINE FOLLOW UP:    Uri Valladares is a 28 year old male who presents to clinic today for follow up of Buprenorphine.      Date of last visit:  11/8/2018    [place] Board of Pharmacy Data Base Reviewed:    YES;     RX 11/7/18 Suboxone 8mg bid #60     Brief History:    Initial visit 7/19/18  Detox 7/7/18-7/10/18  Using opioid off and on for 10 yr.   More recently tolerance increasing to the poin of Percocet/Fentanyl  up to 90-100mcg/day snorting.  No IV use.  Moderated hx of alcohol use.  Quit smoking in detox.  Hx of depression.             HPI:     12/5/2018  Did quit job.  Felt it was interfering with sobriety.    Is out of med past few days.  May have taken occasional half dose on few occasions.  Should have med for another few days.    Still sober house and attending [employer].   Mother of baby came back into life and then this was stressful but has been able to set some boundries for now.  Still smoking but trying to cut back.        Status since last visit: Since last visit patient has been:stable    Intensity:     There has been: mild intermittent craving    Suboxone Dose: was adequate when taking regularly    Progression of Symptoms/Precipitating Factors:     Cues to use and relapse triggers:Anxiety    Trigger have been:  mild    Accompanying Signs & Symptoms:    Side Effects: none    Sobriety:     Status: No substance use     Drug Screen: obtained    Alleviating factors/Other Therapies Tried:    Contact with sponsor has been: currently in treatment    Family and support system has been: helpful    Patient has been going to recovery meetings: regularly    Recovery program has been : active    Patient has been participating in professional counseling /therapy: NO    Patient is following with psychiatry: NO    Patient has established PCP: NO        Social History     Social History Narrative    Most recently living in car and some with  mother.   Court recent for disorderly conduct and Hx of DUI few months ago (just need to take class to clear).          3 children  (8yr, 5yr and 2yr.)  Youngest lives here.  Two oldest live in [facility].         Patient Active Problem List    Diagnosis Date Noted     Chemical dependency (H) 07/10/2018     Priority: Medium     Opioid withdrawal (H) 07/07/2018     Priority: Medium     Suicidal ideation 05/15/2017     Priority: Medium       Problem list and histories reviewed & adjusted, as indicated.  Additional history: as documented        Current Outpatient Prescriptions on File Prior to Visit:  buprenorphine-naloxone (SUBOXONE) 8-2 MG SUBL sublingual tablet One tab daily am and 1 tab daily q pm.   ketoconazole (NIZORAL) 2 % cream Apply topically 2 times daily   ketoconazole (NIZORAL) 2 % shampoo Apply to the affected area and wash off after 5 minutes.   loratadine (CLARITIN) 10 MG tablet Take 10 mg by mouth daily as needed for allergies   melatonin 3 MG CAPS Take 6 mg by mouth At Bedtime   MELATONIN PO Take 3 mg by mouth nightly as needed   mupirocin (BACTROBAN) 2 % ointment Place in nose 2 x day until sx resolve   nicotine (NICODERM CQ) 7 MG/24HR 24 hr patch Place 1 patch onto the skin every 24 hours   nicotine polacrilex (NICOTINE MINI) 2 MG lozenge Place 1 lozenge (2 mg) inside cheek every hour as needed for smoking cessation   phenol-menthol (CEPASTAT) 14.5 MG lozenge Place 1 lozenge inside cheek every 2 hours as needed for moderate pain   polyethylene glycol (MIRALAX) powder Take 17 g (1 capful) by mouth daily   senna-docusate (SENOKOT-S;PERICOLACE) 8.6-50 MG per tablet Take 2 tablets by mouth daily as needed for constipation     Current Facility-Administered Medications on File Prior to Visit:  Self Administer Medications: Behavioral Services       No Known Allergies      REVIEW OF SYSTEMS:  General: No acute withdrawal symptoms.  No recent infections or fever  Resp: No coughing, wheezing or shortness  of breath  CV: No chest pains or palpitations  GI: No nausea, vomiting, abdominal pain, diarrhea, constipation  : No urinary frequency or dysuria,     Musculoskeletal: No significant muscle or joint pains, No edema  Neurologic: No numbness, tingling, weakness, problems with balance or coordination  Psychiatric: No acute concerns  Skin: No rashes    OBJECTIVE:    PHYSICAL EXAM:  There were no vitals taken for this visit.    GENERAL APPEARANCE:  alert, comfortable appearing  EYES:Eyes grossly normal to inspection  NEURO:  Gait normal.  No tremor. Coordination intact.   MENTAL STATUS EXAM:  Appearance/Behavior: No appearant distress  Speech: Normal  Mood/Affect: normal affect  Insight: Adequate      Results for orders placed or performed in visit on 12/05/18   Urine Drugs of Abuse Screen Panel 13   Result Value Ref Range    Cannabinoids (41-hrv-5-carboxy-9-THC) Not Detected NDET^Not Detected ng/mL    Phencyclidine (Phencyclidine) Not Detected NDET^Not Detected ng/mL    Cocaine (Benzoylecgonine) Not Detected NDET^Not Detected ng/mL    Methamphetamine (d-Methamphetamine) Not Detected NDET^Not Detected ng/mL    Opiates (Morphine) Not Detected NDET^Not Detected ng/mL    Amphetamine (d-Amphetamine) Not Detected NDET^Not Detected ng/mL    Benzodiazepines (Nordiazepam) Not Detected NDET^Not Detected ng/mL    Tricyclic Antidepressants (Desipramine) Not Detected NDET^Not Detected ng/mL    Methadone (Methadone) Not Detected NDET^Not Detected ng/mL    Barbiturates (Butalbital) Not Detected NDET^Not Detected ng/mL    Oxycodone (Oxycodone) Not Detected NDET^Not Detected ng/mL    Propoxyphene (Norpropoxyphene) Not Detected NDET^Not Detected ng/mL    Buprenorphine (Buprenorphine) Detected, Abnormal Result (A) NDET^Not Detected ng/mL           ASSESSMENT/PLAN:        F11.23) Opioid withdrawal (H)  (primary encounter diagnosis)      Plan: buprenorphine-naloxone (SUBOXONE) 8-2 MG SUBL         sublingual tablet       Continue 8mg q am  and 8mg q pm.   Follow up 4 wk.   Continue current  Aftercare and sober living.   Taking medication as prescribed discussed.  Avoid increase dose.  Receptor saturation discussed.  Safe storage reviewed.       Suboxone risk/benefit/side effect and intended purposes reviewed.        Opioid warning reviewed.  Risk of overdose following a period of abstinence due to decrease tolerance was discussed including risk of death.   Risk of overdose if using Suboxone with other substances particuarly benzodiazepines/alcohol was reviewed.       Counseled the patient on the importance of having a recovery program in addition to Medication assisted treatment.  Components include having some type of sober network, avoiding isolating, having willingness  to change, avoiding triggers and managing cravings.    Encouraged other services such as counseling, 12 step or other self-help organizations.        Strongly recommended abstain from alcohol, benzodiazepines, THC, opioids and other drugs of abuse.  Increased risk of relapse for opioids with use of these substances discussed.  Increased risk of overdose/death with use of other substances particularly benzodiazepines/alcohol reviewed.             (F33.9) Episode of recurrent major depressive disorder, unspecified depression episode severity (H)  Plan: will need follow up PCP. Monitor for need for  medications.   Encouraged mental health counseling      (K59.03)  Constipation   miralax 17gm daily.  Titrate amount as needed.  High fiber diet, increase fluid intake.  Follow up prn.       (Z87.061) Nicotine use disorder.   Has started smoking again.   Plan: ecourage abstinence. Encouraged Nicotine Abstinence.  Increase risk of relapse with other substances with nicotine use discussed.    Risk of Ecig/Vaping also reviewed.    Other nicotine cessation such as lozenge, gum, patch reviewed.   Chantix also discussed. Risks, benefits, side effects and intended purposes discussed.  Other  supports such as Quit plan reviewed.               ENCOUNTER FOR LONG TERM USE OF HIGH RISK MEDICATION   High Risk Drug Monitoring?  YES   Drug being monitored: Buprenorphine   Reason for drug: Opioid Use Disorder   What is being monitored?: Dosage, Cravings, Trigger, side effects, and continued abstinence.      Opioid warning reviewed.  Risk of overdose following a period of abstinence due to decrease tolerance was discussed including risk of death.   Risk of overdose if using Suboxone with other substances particuarly benzodiazepines/alcohol was reviewed.        Sera Bardales MD  [facility]  [phone]

## 2018-12-05 NOTE — MR AVS SNAPSHOT
"              After Visit Summary   12/5/2018    Uri Valladares    MRN: [id number]           Patient Information     Date Of Birth          1990        Visit Information        Provider Department      12/5/2018 3:40 PM Sera Bardales [place] [facility]        Today's Diagnoses     Opioid use disorder, severe, dependence (H)    -  1    Episode of recurrent major depressive disorder, unspecified depression episode severity (H)        Drug-induced constipation        Former smoker        High risk medication use        Opioid withdrawal (H)           Follow-ups after your visit        Your next 10 appointments already scheduled     Jan 02, 2019  2:20 PM CST   Return Visit with Sera Bardales MD   [facility] ([facility]    [address] [place] [address]  [address]  [facility] [address] [phone]              Who to contact     If you have questions or need follow up information about today's clinic visit or your schedule please contact [facility] directly at [phone].  Normal or non-critical lab and imaging results will be communicated to you by [employer], letter or phone within 4 business days after the clinic has received the results. If you do not hear from us within 7 days, please contact the clinic through [employer] or phone. If you have a critical or abnormal lab result, we will notify you by phone as soon as possible.  Submit refill requests through [employer] or call your pharmacy and they will forward the refill request to us. Please allow 3 business days for your refill to be completed.          Additional Information About Your Visit        [employer] Information     [employer] lets you send messages to your doctor, view your test results, renew your prescriptions, schedule appointments and more. To sign up, go to [email or web address] . Click on \"Log in\" on the left side of the screen, " "which will take you to the Welcome page. Then click on \"Sign up Now\" on the right side of the page.     You will be asked to enter the access code listed below, as well as some personal information. Please follow the directions to create your username and password.     Your access code is: [email or web address]  Expires: 3/5/2019  9:17 PM     Your access code will  in 90 days. If you need help or a new code, please call your [place] clinic or [phone].        Care EveryWhere ID     This is your Care EveryWhere ID. This could be used by other organizations to access your [facility] medical records  [id number]        Your Vitals Were     Pulse Respirations Pulse Oximetry BMI (Body Mass Index)          88 16 99% 21.25 kg/m2         Blood Pressure from Last 3 Encounters:   18 122/74   18 116/68   10/04/18 112/74    Weight from Last 3 Encounters:   18 165 lb 8 oz (75.1 kg)   18 165 lb 8 oz (75.1 kg)   10/04/18 170 lb (77.1 kg)              We Performed the Following     Urine Drugs of Abuse Screen Panel 13          Where to get your medicines      Some of these will need a paper prescription and others can be bought over the counter.  Ask your nurse if you have questions.     Bring a paper prescription for each of these medications     buprenorphine-naloxone 8-2 MG Subl sublingual tablet          Primary Care Provider Fax #    Physician No Ref-Primary [phone]       No address on file        Equal Access to Services     NEAL GARCIA : Hadii cesar alexandreo Sobernarda, waaxda luqadaha, qaybta kaalmada silvio, bear moraes . So [facility] [phone].    ATENCIÓN: Si habla español, tiene a dutta disposición servicios gratuitos de asistencia lingüística. Llame al [phone].    We comply with applicable federal civil rights laws and [place] laws. We do not discriminate on the basis of race, color, national origin, age, disability, sex, sexual orientation, or gender " identity.            Thank you!     Thank you for choosing [facility]  for your care. Our goal is always to provide you with excellent care. Hearing back from our patients is one way we can continue to improve our services. Please take a few minutes to complete the written survey that you may receive in the mail after your visit with us. Thank you!             Your Updated Medication List - Protect others around you: Learn how to safely use, store and throw away your medicines at www.disposemymeds.org.          This list is accurate as of 12/5/18  9:17 PM.  Always use your most recent med list.                   Brand Name Dispense Instructions for use Diagnosis    buprenorphine-naloxone 8-2 MG Subl sublingual tablet    SUBOXONE    60 each    One tab daily am and 1 tab daily q pm.    Opioid withdrawal (H)       * ketoconazole 2 % external cream    NIZORAL    60 g    Apply topically 2 times daily    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       * ketoconazole 2 % external shampoo    NIZORAL    120 mL    Apply to the affected area and wash off after 5 minutes.    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       loratadine 10 MG tablet    CLARITIN     Take 10 mg by mouth daily as needed for allergies        melatonin 3 MG Caps     60 capsule    Take 6 mg by mouth At Bedtime    Opioid withdrawal (H)       MELATONIN PO      Take 3 mg by mouth nightly as needed        mupirocin 2 % external ointment    BACTROBAN    22 g    Place in nose 2 x day until sx resolve    Skin irritation       nicotine 2 MG lozenge    NICOTINE MINI    360 lozenge    Place 1 lozenge (2 mg) inside cheek every hour as needed for smoking cessation    Former smoker       nicotine 7 MG/24HR 24 hr patch    NICODERM CQ    30 patch    Place 1 patch onto the skin every 24 hours    Former smoker       phenol-menthol 14.5 MG lozenge      Place 1 lozenge inside cheek every 2 hours as needed for moderate pain        polyethylene  glycol powder    MIRALAX    510 g    Take 17 g (1 capful) by mouth daily    Drug-induced constipation       senna-docusate 8.6-50 MG tablet    SENOKOT-S/PERICOLACE     Take 2 tablets by mouth daily as needed for constipation        * Notice:  This list has 2 medication(s) that are the same as other medications prescribed for you. Read the directions carefully, and ask your doctor or other care provider to review them with you.

## 2019-01-17 ENCOUNTER — OFFICE VISIT (OUTPATIENT)
Dept: ADDICTION MEDICINE | Facility: CLINIC | Age: 29
End: 2019-01-17
Payer: COMMERCIAL

## 2019-01-17 VITALS
WEIGHT: 163.5 LBS | SYSTOLIC BLOOD PRESSURE: 112 MMHG | RESPIRATION RATE: 16 BRPM | DIASTOLIC BLOOD PRESSURE: 68 MMHG | BODY MASS INDEX: 20.99 KG/M2

## 2019-01-17 DIAGNOSIS — F33.9 EPISODE OF RECURRENT MAJOR DEPRESSIVE DISORDER, UNSPECIFIED DEPRESSION EPISODE SEVERITY (H): ICD-10-CM

## 2019-01-17 DIAGNOSIS — Z79.899 HIGH RISK MEDICATION USE: ICD-10-CM

## 2019-01-17 DIAGNOSIS — F11.20 OPIOID USE DISORDER, SEVERE, DEPENDENCE (H): Primary | ICD-10-CM

## 2019-01-17 DIAGNOSIS — K59.03 DRUG-INDUCED CONSTIPATION: ICD-10-CM

## 2019-01-17 DIAGNOSIS — Z87.891 FORMER SMOKER: ICD-10-CM

## 2019-01-17 PROCEDURE — 99215 OFFICE O/P EST HI 40 MIN: CPT | Performed by: PEDIATRICS

## 2019-01-17 PROCEDURE — 80306 DRUG TEST PRSMV INSTRMNT: CPT | Performed by: PEDIATRICS

## 2019-01-17 RX ORDER — BUPRENORPHINE HYDROCHLORIDE AND NALOXONE HYDROCHLORIDE DIHYDRATE 8; 2 MG/1; MG/1
TABLET SUBLINGUAL
Qty: 60 EACH | Refills: 0 | Status: SHIPPED | OUTPATIENT
Start: 2019-01-17 | End: 2019-02-08

## 2019-01-17 NOTE — PROGRESS NOTES
SUBJECTIVE:                                                    BUPRENORPHINE FOLLOW UP:    Uri Valladares is a 28 year old male who presents to clinic today for follow up of Buprenorphine.      Date of last visit:  1/2/2019 cancel   12/5/18    [place] Board of Pharmacy Data Base Reviewed:    YES;     12/5/18 Suboxone 8mg tab #60        Brief History:   Initial visit 7/19/18  Detox 7/7/18-7/10/18  Using opioid off and on for 10 yr.   More recently tolerance increasing to the poin of Percocet/Fentanyl  up to 90-100mcg/day snorting.  No IV use.  Moderated hx of alcohol use.  Quit smoking in detox.  Hx of depression.             HPI:     1/17/2019  Not working now.  Going to meetings/ 2 x wk.  In sober house.  Still going to [employer] but finishing soon.    Mother of baby back in life and this is going well.  Son is 2 yr old.    Smoking about 1/2 ppd.     Suboxone 8mg bid.  Sober now about 6mo.  No side effects noted.  No craving or use.             Social History     Social History Narrative    Most recently living in car and some with mother.   Court recent for disorderly conduct and Hx of DUI few months ago (just need to take class to clear).          3 children  (8yr, 5yr and 2yr.)  Youngest lives here.  Two oldest live in [facility].         Patient Active Problem List    Diagnosis Date Noted     Chemical dependency (H) 07/10/2018     Priority: Medium     Opioid withdrawal (H) 07/07/2018     Priority: Medium     Suicidal ideation 05/15/2017     Priority: Medium       Problem list and histories reviewed & adjusted, as indicated.  Additional history: as documented        Current Outpatient Medications on File Prior to Visit:  buprenorphine-naloxone (SUBOXONE) 8-2 MG SUBL sublingual tablet One tab daily am and 1 tab daily q pm.   ketoconazole (NIZORAL) 2 % cream Apply topically 2 times daily   ketoconazole (NIZORAL) 2 % shampoo Apply to the affected area and wash off after 5 minutes.   loratadine (CLARITIN) 10 MG  tablet Take 10 mg by mouth daily as needed for allergies   melatonin 3 MG CAPS Take 6 mg by mouth At Bedtime   MELATONIN PO Take 3 mg by mouth nightly as needed   mupirocin (BACTROBAN) 2 % ointment Place in nose 2 x day until sx resolve   nicotine (NICODERM CQ) 7 MG/24HR 24 hr patch Place 1 patch onto the skin every 24 hours   nicotine polacrilex (NICOTINE MINI) 2 MG lozenge Place 1 lozenge (2 mg) inside cheek every hour as needed for smoking cessation   phenol-menthol (CEPASTAT) 14.5 MG lozenge Place 1 lozenge inside cheek every 2 hours as needed for moderate pain   polyethylene glycol (MIRALAX) powder Take 17 g (1 capful) by mouth daily   senna-docusate (SENOKOT-S;PERICOLACE) 8.6-50 MG per tablet Take 2 tablets by mouth daily as needed for constipation     Current Facility-Administered Medications on File Prior to Visit:  Self Administer Medications: Behavioral Services       No Known Allergies      REVIEW OF SYSTEMS:  General: No acute withdrawal symptoms.  No recent infections or fever  Resp: No coughing, wheezing or shortness of breath  CV: No chest pains or palpitations  GI: No nausea, vomiting, abdominal pain, diarrhea, constipation  : No urinary frequency or dysuria,     Musculoskeletal: No significant muscle or joint pains, No edema  Neurologic: No numbness, tingling, weakness, problems with balance or coordination  Psychiatric: No acute concerns  Skin: No rashes    OBJECTIVE:    PHYSICAL EXAM:  There were no vitals taken for this visit.    GENERAL APPEARANCE:  alert, comfortable appearing  EYES:Eyes grossly normal to inspection  NEURO:  Gait normal.  No tremor. Coordination intact.   MENTAL STATUS EXAM:  Appearance/Behavior: No appearant distress  Speech: Normal  Mood/Affect: normal affect  Insight: Adequate      Results for orders placed or performed in visit on 01/17/19   Urine Drugs of Abuse Screen Panel 13   Result Value Ref Range    Cannabinoids (32-sou-0-carboxy-9-THC) Not Detected NDET^Not  Detected ng/mL    Phencyclidine (Phencyclidine) Not Detected NDET^Not Detected ng/mL    Cocaine (Benzoylecgonine) Not Detected NDET^Not Detected ng/mL    Methamphetamine (d-Methamphetamine) Not Detected NDET^Not Detected ng/mL    Opiates (Morphine) Not Detected NDET^Not Detected ng/mL    Amphetamine (d-Amphetamine) Not Detected NDET^Not Detected ng/mL    Benzodiazepines (Nordiazepam) Not Detected NDET^Not Detected ng/mL    Tricyclic Antidepressants (Desipramine) Not Detected NDET^Not Detected ng/mL    Methadone (Methadone) Not Detected NDET^Not Detected ng/mL    Barbiturates (Butalbital) Not Detected NDET^Not Detected ng/mL    Oxycodone (Oxycodone) Not Detected NDET^Not Detected ng/mL    Propoxyphene (Norpropoxyphene) Not Detected NDET^Not Detected ng/mL    Buprenorphine (Buprenorphine) Detected, Abnormal Result (A) NDET^Not Detected ng/mL           ASSESSMENT/PLAN     F11.23) Opioid withdrawal (H)  (primary encounter diagnosis)      Plan: buprenorphine-naloxone (SUBOXONE) 8-2 MG SUBL         sublingual tablet       Continue 8mg q am and 8mg q pm.   Follow up 4 wk.   Continue current  Aftercare and sober living.   Taking medication as prescribed discussed.  Avoid increase dose.  Receptor saturation discussed.  Safe storage reviewed.       Suboxone risk/benefit/side effect and intended purposes reviewed.        Opioid warning reviewed.  Risk of overdose following a period of abstinence due to decrease tolerance was discussed including risk of death.   Risk of overdose if using Suboxone with other substances particuarly benzodiazepines/alcohol was reviewed.       Counseled the patient on the importance of having a recovery program in addition to Medication assisted treatment.  Components include having some type of sober network, avoiding isolating, having willingness  to change, avoiding triggers and managing cravings.    Encouraged other services such as counseling, 12 step or other self-help organizations.         Strongly recommended abstain from alcohol, benzodiazepines, THC, opioids and other drugs of abuse.  Increased risk of relapse for opioids with use of these substances discussed.  Increased risk of overdose/death with use of other substances particularly benzodiazepines/alcohol reviewed.             (F33.9) Episode of recurrent major depressive disorder, unspecified depression episode severity (H)  Plan: will need follow up PCP. Monitor for need for  medications.   Encouraged mental health counseling      (K59.03)  Constipation   miralax 17gm daily.  Titrate amount as needed.  High fiber diet, increase fluid intake.  Follow up prn.       (Z87.891) Nicotine use disorder.   Has started smoking again.   Plan: ecourage abstinence. Encouraged Nicotine Abstinence.  Increase risk of relapse with other substances with nicotine use discussed.    Risk of Ecig/Vaping also reviewed.    Other nicotine cessation such as lozenge, gum, patch reviewed.   Chantix also discussed. Risks, benefits, side effects and intended purposes discussed.  Other supports such as Quit plan reviewed.             (Z05.173) High risk medication use  ENCOUNTER FOR LONG TERM USE OF HIGH RISK MEDICATION   High Risk Drug Monitoring?  YES   Drug being monitored: Buprenorphine   Reason for drug: Opioid Use Disorder   What is being monitored?: Dosage, Cravings, Trigger, side effects, and continued abstinence.      Opioid warning reviewed.  Risk of overdose following a period of abstinence due to decrease tolerance was discussed including risk of death.   Risk of overdose if using Suboxone with other substances particuarly benzodiazepines/alcohol was reviewed.        Sera Bardales MD  [facility]  [phone]

## 2019-02-08 ENCOUNTER — OFFICE VISIT (OUTPATIENT)
Dept: ADDICTION MEDICINE | Facility: CLINIC | Age: 29
End: 2019-02-08
Payer: COMMERCIAL

## 2019-02-08 VITALS
OXYGEN SATURATION: 99 % | BODY MASS INDEX: 21.06 KG/M2 | HEART RATE: 95 BPM | TEMPERATURE: 97.3 F | RESPIRATION RATE: 14 BRPM | SYSTOLIC BLOOD PRESSURE: 122 MMHG | DIASTOLIC BLOOD PRESSURE: 68 MMHG | WEIGHT: 164 LBS

## 2019-02-08 DIAGNOSIS — F33.9 EPISODE OF RECURRENT MAJOR DEPRESSIVE DISORDER, UNSPECIFIED DEPRESSION EPISODE SEVERITY (H): ICD-10-CM

## 2019-02-08 DIAGNOSIS — Z87.891 FORMER SMOKER: ICD-10-CM

## 2019-02-08 DIAGNOSIS — F11.20 OPIOID USE DISORDER, SEVERE, DEPENDENCE (H): Primary | ICD-10-CM

## 2019-02-08 DIAGNOSIS — K59.03 DRUG-INDUCED CONSTIPATION: ICD-10-CM

## 2019-02-08 PROCEDURE — 99214 OFFICE O/P EST MOD 30 MIN: CPT | Performed by: PEDIATRICS

## 2019-02-08 PROCEDURE — 80306 DRUG TEST PRSMV INSTRMNT: CPT | Performed by: PEDIATRICS

## 2019-02-08 RX ORDER — BUPRENORPHINE HYDROCHLORIDE AND NALOXONE HYDROCHLORIDE DIHYDRATE 8; 2 MG/1; MG/1
TABLET SUBLINGUAL
Qty: 60 EACH | Refills: 0 | Status: SHIPPED | OUTPATIENT
Start: 2019-02-08 | End: 2019-04-01

## 2019-02-08 NOTE — PROGRESS NOTES
SUBJECTIVE:                                                    BUPRENORPHINE FOLLOW UP:    Uri Valladares is a 28 year old male who presents to clinic today for follow up of Buprenorphine.      Date of last visit:  2019    /[place] Board of Pharmacy Data Base Reviewed:    YES;    19 Suboxone 8mg tab #60    Brief History:     Initial visit 18  Detox 18-7/10/18  Using opioid off and on for 10 yr.   More recently tolerance increasing to the poin of Percocet/Fentanyl  up to 90-100mcg/day snorting.  No IV use.  Moderated hx of alcohol use.  Quit smoking in detox.  Hx of depression.             HPI:   2019  Thinking of moving back to [facility] in future.  Finished [employer].  Still in sober house.    Mother of baby back in life with 2 yr old and would move as well.    Suboxone 8mg bid.  Sober now 8mo.   Longest previous sober time similar.    This dose is working well.  No craving or side effects.   Smoking occasionally.               Social History     Social History Narrative    Most recently living in car and some with mother.   Court recent for disorderly conduct and Hx of DUI few months ago (just need to take class to clear).          3 children  (8yr, 5yr and 2yr.)  Youngest lives here.  Two oldest live in [facility].         Patient Active Problem List    Diagnosis Date Noted     Chemical dependency (H) 07/10/2018     Priority: Medium     Opioid withdrawal (H) 2018     Priority: Medium     Suicidal ideation 05/15/2017     Priority: Medium       Problem list and histories reviewed & adjusted, as indicated.  Additional history: as documented        Current Outpatient Medications on File Prior to Visit:  [] buprenorphine HCl-naloxone HCl (SUBOXONE) 4-1 MG per film Place 1 Film under the tongue 2 times daily for 8 days   buprenorphine-naloxone (SUBOXONE) 8-2 MG SUBL sublingual tablet One tab daily am and 1 tab daily q pm.   ketoconazole (NIZORAL) 2 % cream Apply topically 2  times daily   ketoconazole (NIZORAL) 2 % shampoo Apply to the affected area and wash off after 5 minutes.   loratadine (CLARITIN) 10 MG tablet Take 10 mg by mouth daily as needed for allergies   melatonin 3 MG CAPS Take 6 mg by mouth At Bedtime   MELATONIN PO Take 3 mg by mouth nightly as needed   mupirocin (BACTROBAN) 2 % ointment Place in nose 2 x day until sx resolve   nicotine (NICODERM CQ) 7 MG/24HR 24 hr patch Place 1 patch onto the skin every 24 hours   nicotine polacrilex (NICOTINE MINI) 2 MG lozenge Place 1 lozenge (2 mg) inside cheek every hour as needed for smoking cessation   phenol-menthol (CEPASTAT) 14.5 MG lozenge Place 1 lozenge inside cheek every 2 hours as needed for moderate pain   polyethylene glycol (MIRALAX) powder Take 17 g (1 capful) by mouth daily   senna-docusate (SENOKOT-S;PERICOLACE) 8.6-50 MG per tablet Take 2 tablets by mouth daily as needed for constipation     Current Facility-Administered Medications on File Prior to Visit:  Self Administer Medications: Behavioral Services       No Known Allergies      REVIEW OF SYSTEMS:  General: No acute withdrawal symptoms.  No recent infections or fever  Resp: No coughing, wheezing or shortness of breath  CV: No chest pains or palpitations  GI: No nausea, vomiting, abdominal pain, diarrhea, constipation  : No urinary frequency or dysuria,     Musculoskeletal: No significant muscle or joint pains, No edema  Neurologic: No numbness, tingling, weakness, problems with balance or coordination  Psychiatric: No acute concerns  Skin: No rashes    OBJECTIVE:    PHYSICAL EXAM:  There were no vitals taken for this visit.    GENERAL APPEARANCE:  alert, comfortable appearing  EYES:Eyes grossly normal to inspection  NEURO:  Gait normal.  No tremor. Coordination intact.   MENTAL STATUS EXAM:  Appearance/Behavior: No appearant distress  Speech: Normal  Mood/Affect: normal affect  Insight: Adequate      Results for orders placed or performed in visit on  01/17/19   Urine Drugs of Abuse Screen Panel 13   Result Value Ref Range    Cannabinoids (78-xnp-8-carboxy-9-THC) Not Detected NDET^Not Detected ng/mL    Phencyclidine (Phencyclidine) Not Detected NDET^Not Detected ng/mL    Cocaine (Benzoylecgonine) Not Detected NDET^Not Detected ng/mL    Methamphetamine (d-Methamphetamine) Not Detected NDET^Not Detected ng/mL    Opiates (Morphine) Not Detected NDET^Not Detected ng/mL    Amphetamine (d-Amphetamine) Not Detected NDET^Not Detected ng/mL    Benzodiazepines (Nordiazepam) Not Detected NDET^Not Detected ng/mL    Tricyclic Antidepressants (Desipramine) Not Detected NDET^Not Detected ng/mL    Methadone (Methadone) Not Detected NDET^Not Detected ng/mL    Barbiturates (Butalbital) Not Detected NDET^Not Detected ng/mL    Oxycodone (Oxycodone) Not Detected NDET^Not Detected ng/mL    Propoxyphene (Norpropoxyphene) Not Detected NDET^Not Detected ng/mL    Buprenorphine (Buprenorphine) Detected, Abnormal Result (A) NDET^Not Detected ng/mL           ASSESSMENT/PLAN:        F11.23) Opioid withdrawal (H)  (primary encounter diagnosis)      Plan: buprenorphine-naloxone (SUBOXONE) 8-2 MG SUBL         sublingual tablet       Continue 8mg q am and 8mg q pm.   Follow up 4 wk.   Continue current  aftercare and sober living.  Given info on providers from [facility] site in [facility] area.  Will discuss further at next visit.    Taking medication as prescribed discussed.  Avoid increase dose.  Receptor saturation discussed.  Safe storage reviewed.       Suboxone risk/benefit/side effect and intended purposes reviewed.        Opioid warning reviewed.  Risk of overdose following a period of abstinence due to decrease tolerance was discussed including risk of death. Risk of overdose if using Suboxone with other substances particuarly benzodiazepines/alcohol was reviewed.       Counseled the patient on the importance of having a recovery program in addition to Medication assisted  treatment.  Components include having some type of sober network, avoiding isolating, having willingness  to change, avoiding triggers and managing cravings.    Encouraged other services such as counseling, 12 step or other self-help organizations.        Strongly recommended abstain from alcohol, benzodiazepines, THC, opioids and other drugs of abuse.  Increased risk of relapse for opioids with use of these substances discussed.  Increased risk of overdose/death with use of other substances particularly benzodiazepines/alcohol reviewed.             (F33.9) Episode of recurrent major depressive disorder, unspecified depression episode severity (H)  Plan: will need follow up PCP. Monitor for need for  medications.   Encouraged mental health counseling      (K59.03)  Constipation   miralax 17gm daily.  Titrate amount as needed.  High fiber diet, increase fluid intake.  Follow up prn.       (Z24.761) Nicotine use disorder. .   Plan: ecourage abstinence. Encouraged Nicotine Abstinence.  Increase risk of relapse with other substances with nicotine use discussed.    Risk of Ecig/Vaping also reviewed.    Other nicotine cessation such as lozenge, gum, patch reviewed.   Chantix also discussed. Risks, benefits, side effects and intended purposes discussed.  Other supports such as Quit plan reviewed.             (T33.867) High risk medication use        ENCOUNTER FOR LONG TERM USE OF HIGH RISK MEDICATION   High Risk Drug Monitoring?  YES   Drug being monitored: Buprenorphine   Reason for drug: Opioid Use Disorder   What is being monitored?: Dosage, Cravings, Trigger, side effects, and continued abstinence.      Opioid warning reviewed.  Risk of overdose following a period of abstinence due to decrease tolerance was discussed including risk of death.   Risk of overdose if using Suboxone with other substances particuarly benzodiazepines/alcohol was reviewed.        Sera Bardales MD  [facility]  [phone]

## 2019-03-05 ENCOUNTER — TELEPHONE (OUTPATIENT)
Dept: ADDICTION MEDICINE | Facility: CLINIC | Age: 29
End: 2019-03-05

## 2019-03-05 NOTE — TELEPHONE ENCOUNTER
Called and left message for patient to see if he would be willing to reschedule appointment scheduled for Friday 3/9/19 to earlier in the day or perhaps a different day with a bridge.  He had been considering moving back to [place] and I am not entirely certain that this is not occurred.  Asked him to return phone call.

## 2019-03-19 ENCOUNTER — PATIENT OUTREACH (OUTPATIENT)
Dept: ADDICTION MEDICINE | Facility: CLINIC | Age: 29
End: 2019-03-19

## 2019-03-19 NOTE — PROGRESS NOTES
Clinic Care Coordination Contact 3/19/2019  Care Team Conversations    SW spoke with pt regarding survey. Pt is agreeable to taking survey. SW encouraged pt to make follow up appointment with Dr. Bardales and SW will schedule time to meet with pt prior to appointment.     TALIB Mackey   Ambulatory Care Coordination [facility]  Phone: [phone]  Email: [email or web address]  3/19/2019 2:53 PM

## 2019-03-28 ENCOUNTER — PATIENT OUTREACH (OUTPATIENT)
Dept: ADDICTION MEDICINE | Facility: CLINIC | Age: 29
End: 2019-03-28

## 2019-04-01 ENCOUNTER — TELEPHONE (OUTPATIENT)
Dept: ADDICTION MEDICINE | Facility: CLINIC | Age: 29
End: 2019-04-01

## 2019-04-01 DIAGNOSIS — F11.20 OPIOID USE DISORDER, SEVERE, DEPENDENCE (H): ICD-10-CM

## 2019-04-01 RX ORDER — BUPRENORPHINE HYDROCHLORIDE AND NALOXONE HYDROCHLORIDE DIHYDRATE 8; 2 MG/1; MG/1
TABLET SUBLINGUAL
Qty: 30 EACH | Refills: 0 | Status: SHIPPED | OUTPATIENT
Start: 2019-04-01 | End: 2019-09-18

## 2019-04-01 NOTE — TELEPHONE ENCOUNTER
Patient should have been out of med on 3/8?  Did he say what has been going on that he is just out now?

## 2019-04-01 NOTE — TELEPHONE ENCOUNTER
According to , patient picked up most recent Rx on 2/27/19 which would have lasted him until 3/28/19 with none to take starting 3/29/19. Writer assumed this fell within the 3 day rule for bridge requests.    Routing back to provider for instruction on is she would like me to call patient.

## 2019-04-01 NOTE — TELEPHONE ENCOUNTER
Refill for: suboxone    Last Appointment: 2/8/19    Next Appointment: 4/15/19    No Shows/Cancellations since last appointment:  Cancel and No Show 3/8/19    Last Refill in Epic (date and amount/how many days):    Disp Refills Start End [facility]   buprenorphine-naloxone (SUBOXONE) 8-2 MG SUBL sublingual tablet 60 each 0 2/8/2019  No   Sig: One tab daily am and 1 tab daily q pm.     Most Recent UDS results: 2/8/19 POS for Buprenorphine     reviewed and summarized below:   Fill Date Written   Drug    Qty Days Prescriber  02/27/2019 02/08/2019 Buprenorphin-Naloxon 8-2 Mg Sl 60 30 Ei Bur     Rx pended with appropriate QTY for bridge. Routing to provider for review and approval.

## 2019-04-01 NOTE — TELEPHONE ENCOUNTER
Reason for Call:  Medication Bridge    Do you use a [facility]?  Name of the pharmacy and phone number for the current request:  [facility] - [phone]    Name of the medication requested: buprenorphine-naloxone (SUBOXONE) 8-2 MG SUBL sublingual tablet    Other request: Pt called requesting appointment for today. Provider is booking out more than 2 weeks. Pt was able to schedule for soonest available appt 4/15/19 @ 3:40. Pt stated that he doesn't have any for today.     Can we leave a detailed message on this number? YES    Phone number patient can be reached at: Home number on file [phone] (home)    Best Time: anytime    Call taken on 4/1/2019 at 10:01 AM by Ting Horton

## 2019-04-04 ENCOUNTER — PATIENT OUTREACH (OUTPATIENT)
Dept: ADDICTION MEDICINE | Facility: CLINIC | Age: 29
End: 2019-04-04

## 2019-04-15 ENCOUNTER — OFFICE VISIT (OUTPATIENT)
Dept: ADDICTION MEDICINE | Facility: CLINIC | Age: 29
End: 2019-04-15
Payer: COMMERCIAL

## 2019-04-15 ENCOUNTER — PATIENT OUTREACH (OUTPATIENT)
Dept: ADDICTION MEDICINE | Facility: CLINIC | Age: 29
End: 2019-04-15

## 2019-04-15 VITALS
HEART RATE: 78 BPM | WEIGHT: 160 LBS | TEMPERATURE: 97.7 F | OXYGEN SATURATION: 99 % | RESPIRATION RATE: 16 BRPM | SYSTOLIC BLOOD PRESSURE: 120 MMHG | BODY MASS INDEX: 20.54 KG/M2 | DIASTOLIC BLOOD PRESSURE: 60 MMHG

## 2019-04-15 DIAGNOSIS — K59.03 DRUG-INDUCED CONSTIPATION: ICD-10-CM

## 2019-04-15 DIAGNOSIS — Z79.899 HIGH RISK MEDICATION USE: ICD-10-CM

## 2019-04-15 DIAGNOSIS — Z87.891 FORMER SMOKER: ICD-10-CM

## 2019-04-15 DIAGNOSIS — F11.20 OPIOID USE DISORDER, SEVERE, DEPENDENCE (H): Primary | ICD-10-CM

## 2019-04-15 DIAGNOSIS — F33.9 EPISODE OF RECURRENT MAJOR DEPRESSIVE DISORDER, UNSPECIFIED DEPRESSION EPISODE SEVERITY (H): ICD-10-CM

## 2019-04-15 PROCEDURE — 99215 OFFICE O/P EST HI 40 MIN: CPT | Performed by: PEDIATRICS

## 2019-04-15 PROCEDURE — 80306 DRUG TEST PRSMV INSTRMNT: CPT | Performed by: PEDIATRICS

## 2019-04-15 RX ORDER — BUPRENORPHINE AND NALOXONE 8; 2 MG/1; MG/1
1 FILM, SOLUBLE BUCCAL; SUBLINGUAL 2 TIMES DAILY
Qty: 45 FILM | Refills: 0 | Status: SHIPPED | OUTPATIENT
Start: 2019-04-15 | End: 2019-05-15

## 2019-04-15 NOTE — PROGRESS NOTES
"Clinic Care Coordination Contact 4/15/19  Care Team Conversations    SW met briefly with pt this afternoon to reschedule co-coordinated care intake.     Plan: SW and pt will meet for intake this coming Wednesday (4/17/19) at 10:00am.    TALIB Mackey   Ambulatory Care Coordination AT-PD  Phone: [phone]  Email: [email or web address]  4/15/2019 4:43 PM    4/17/19  SW chart reviewed and notes that pt has \"no-showed\" for intake appt with this SW.    SW called and left voicemail for pt to confirm if they are still interested in taking part in program, to return call and reschedule.     SW will follow up in 1 week.    TALIB Mackey   Ambulatory Care Coordination [facility]  4/17/2019 10:47 AM        "

## 2019-04-15 NOTE — PROGRESS NOTES
SUBJECTIVE:                                                    BUPRENORPHINE FOLLOW UP:    Uri Valladares is a 28 year old male who presents to clinic today for follow up of Buprenorphine.      Date of last visit:  4/1/2019    [place] Board of Pharmacy Data Base Reviewed:    YES;     4/1/19 Suboxone 8mg tab #30          Brief History:    Initial visit 7/19/18  Detox 7/7/18-7/10/18  Using opioid off and on for 10 yr.   More recently tolerance increasing to the poin of Percocet/Fentanyl  up to 90-100mcg/day snorting.  No IV use.  Moderated hx of alcohol use.  Quit smoking in detox.  Hx of depression.             HPI:   2/8/2019  Thinking of moving back to [facility] in future.  Finished [facility].  Still in sober house.    Mother of baby back in life with 2 yr old and would move as well.    Suboxone 8mg bid.  Sober now 8mo.   Longest previous sober time similar.    This dose is working well.  No craving or side effects.   Smoking occasionally.           HPI:    4/15/2019 patient returns for follow-up.  Visit was somewhat shortened due to her provider delay and patient needing to  his significant other.  Is here today with child.  He reports he is doing well but would like to consider tapering.  He notes that the Suboxone seems to significantly adversely affect his appetite and when he has been on lower doses in the past he has been more successful with maintaining a healthy weight.  He states that he just has no appetite.  His current dose of Suboxone 8 mg twice daily.  He is now been sober almost a year.  He is attending some meetings and did finish treatment at [facility].  Still smokes rarely..  Denies any other concerns today.        Social History     Social History Narrative    Most recently living in car and some with mother.   Court recent for disorderly conduct and Hx of DUI few months ago (just need to take class to clear).          3 children  (8yr, 5yr and 2yr.)  Youngest lives here.  Two oldest live  in [place] area.         Patient Active Problem List    Diagnosis Date Noted     Chemical dependency (H) 07/10/2018     Priority: Medium     Opioid withdrawal (H) 07/07/2018     Priority: Medium     Suicidal ideation 05/15/2017     Priority: Medium       Problem list and histories reviewed & adjusted, as indicated.  Additional history: as documented        Current Outpatient Medications on File Prior to Visit:  buprenorphine-naloxone (SUBOXONE) 8-2 MG SUBL sublingual tablet One tab daily am and 1 tab daily q pm. [id number]   ketoconazole (NIZORAL) 2 % cream Apply topically 2 times daily   ketoconazole (NIZORAL) 2 % shampoo Apply to the affected area and wash off after 5 minutes.   loratadine (CLARITIN) 10 MG tablet Take 10 mg by mouth daily as needed for allergies   melatonin 3 MG CAPS Take 6 mg by mouth At Bedtime   MELATONIN PO Take 3 mg by mouth nightly as needed   mupirocin (BACTROBAN) 2 % ointment Place in nose 2 x day until sx resolve   nicotine (NICODERM CQ) 7 MG/24HR 24 hr patch Place 1 patch onto the skin every 24 hours   nicotine polacrilex (NICOTINE MINI) 2 MG lozenge Place 1 lozenge (2 mg) inside cheek every hour as needed for smoking cessation   phenol-menthol (CEPASTAT) 14.5 MG lozenge Place 1 lozenge inside cheek every 2 hours as needed for moderate pain   polyethylene glycol (MIRALAX) powder Take 17 g (1 capful) by mouth daily   senna-docusate (SENOKOT-S;PERICOLACE) 8.6-50 MG per tablet Take 2 tablets by mouth daily as needed for constipation     Current Facility-Administered Medications on File Prior to Visit:  Self Administer Medications: Behavioral Services       No Known Allergies      REVIEW OF SYSTEMS:  General: No acute withdrawal symptoms.  No recent infections or fever  Resp: No coughing, wheezing or shortness of breath  CV: No chest pains or palpitations  GI: No nausea, vomiting, abdominal pain, diarrhea, constipation  : No urinary frequency or dysuria,     Musculoskeletal: No  significant muscle or joint pains, No edema  Neurologic: No numbness, tingling, weakness, problems with balance or coordination  Psychiatric: No acute concerns  Skin: No rashes    OBJECTIVE:    PHYSICAL EXAM:  /60   Pulse 78   Wt 72.6 kg (160 lb)   SpO2 99%   BMI 20.54 kg/m      GENERAL APPEARANCE:  alert, comfortable appearing  EYES:Eyes grossly normal to inspection  NEURO:  Gait normal.  No tremor. Coordination intact.   MENTAL STATUS EXAM:  Appearance/Behavior: No appearant distress  Speech: Normal  Mood/Affect: normal affect  Insight: Adequate      Results for orders placed or performed in visit on 04/15/19   Urine Drugs of Abuse Screen Panel 13   Result Value Ref Range    Cannabinoids (92-sis-1-carboxy-9-THC) Not Detected NDET^Not Detected ng/mL    Phencyclidine (Phencyclidine) Not Detected NDET^Not Detected ng/mL    Cocaine (Benzoylecgonine) Not Detected NDET^Not Detected ng/mL    Methamphetamine (d-Methamphetamine) Not Detected NDET^Not Detected ng/mL    Opiates (Morphine) Not Detected NDET^Not Detected ng/mL    Amphetamine (d-Amphetamine) Not Detected NDET^Not Detected ng/mL    Benzodiazepines (Nordiazepam) Not Detected NDET^Not Detected ng/mL    Tricyclic Antidepressants (Desipramine) Not Detected NDET^Not Detected ng/mL    Methadone (Methadone) Not Detected NDET^Not Detected ng/mL    Barbiturates (Butalbital) Not Detected NDET^Not Detected ng/mL    Oxycodone (Oxycodone) Not Detected NDET^Not Detected ng/mL    Propoxyphene (Norpropoxyphene) Not Detected NDET^Not Detected ng/mL    Buprenorphine (Buprenorphine) Detected, Abnormal Result (A) NDET^Not Detected ng/mL           ASSESSMENT/PLAN:             F11.23) Opioid withdrawal (H)  (primary encounter diagnosis)      Plan: buprenorphine-naloxone (SUBOXONE) 8-2 MG SUBL         sublingual -will attempt to change to film at patient request to facilitate tapering.    Decrease dose to 12 mg daily.  This may be given in 8 mg in a.m. and 4 mg p.m. for 4 mg  3 times daily at patient preference.    Encouraged follow-up with any increased craving.  Monitor for appetite improvement.      No current symptoms follow up 4 wk.     Suboxone risk/benefit/side effect and intended purposes reviewed.        Opioid warning reviewed.  Risk of overdose following a period of abstinence due to decrease tolerance was discussed including risk of death. Risk of overdose if using Suboxone with other substances particuarly benzodiazepines/alcohol was reviewed.       Counseled the patient on the importance of having a recovery program in addition to Medication assisted treatment.  Components include having some type of sober network, avoiding isolating, having willingness  to change, avoiding triggers and managing cravings.    Encouraged other services such as counseling, 12 step or other self-help organizations.        Strongly recommended abstain from alcohol, benzodiazepines, THC, opioids and other drugs of abuse.  Increased risk of relapse for opioids with use of these substances discussed.  Increased risk of overdose/death with use of other substances particularly benzodiazepines/alcohol reviewed.          (F33.9) Episode of recurrent major depressive disorder, unspecified depression episode severity (H)  Plan: will need follow up PCP. Monitor for need for  medications.   Encouraged mental health counseling      (K59.03)  Constipation -miralax 17gm daily.  Titrate amount as needed.  High fiber diet, increase fluid intake.  Follow up prn.       (Z79.331) Nicotine use disorder. .   Plan:  Encouraged Nicotine Abstinence.  Increase risk of relapse with other substances with nicotine use discussed.    Risk of Ecig/Vaping also reviewed.    Other nicotine cessation such as lozenge, gum, patch reviewed.   Chantix also discussed. Risks, benefits, side effects and intended purposes discussed.  Other supports such as Quit plan reviewed.             (L83.078) High risk medication use       ENCOUNTER  FOR LONG TERM USE OF HIGH RISK MEDICATION   High Risk Drug Monitoring?  YES   Drug being monitored: Buprenorphine   Reason for drug: Opioid Use Disorder   What is being monitored?: Dosage, Cravings, Trigger, side effects, and continued abstinence.      Opioid warning reviewed.  Risk of overdose following a period of abstinence due to decrease tolerance was discussed including risk of death.   Risk of overdose if using Suboxone with other substances particuarly benzodiazepines/alcohol was reviewed.        Sera Bardales MD  [facility] Addiction [facility]  [phone]

## 2019-04-15 NOTE — LETTER
[facility]  [address]  [address]  [facility] [address]  [phone]        April 15, 2019    Regarding:  Uri Valladares  [address] [facility] [address]              To Whom It May Concern;    Please excuse patient from work today due to appointment.  If you have any questions please feel free to call my office at number above.            Sincerely,        Sera Bardales MD

## 2019-04-20 ENCOUNTER — APPOINTMENT (OUTPATIENT)
Dept: CT IMAGING | Facility: CLINIC | Age: 29
End: 2019-04-20
Attending: EMERGENCY MEDICINE
Payer: COMMERCIAL

## 2019-04-20 ENCOUNTER — HOSPITAL ENCOUNTER (EMERGENCY)
Facility: CLINIC | Age: 29
Discharge: HOME | End: 2019-04-20
Attending: EMERGENCY MEDICINE | Admitting: EMERGENCY MEDICINE
Payer: COMMERCIAL

## 2019-04-20 VITALS
HEART RATE: 81 BPM | SYSTOLIC BLOOD PRESSURE: 125 MMHG | TEMPERATURE: 98.6 F | DIASTOLIC BLOOD PRESSURE: 80 MMHG | RESPIRATION RATE: 16 BRPM | OXYGEN SATURATION: 98 %

## 2019-04-20 DIAGNOSIS — R51.9 NONINTRACTABLE HEADACHE, UNSPECIFIED CHRONICITY PATTERN, UNSPECIFIED HEADACHE TYPE: ICD-10-CM

## 2019-04-20 LAB
ANION GAP SERPL CALCULATED.3IONS-SCNC: 4 MMOL/L (ref 3–14)
BASOPHILS # BLD AUTO: 0 10E9/L (ref 0–0.2)
BASOPHILS NFR BLD AUTO: 0.8 %
BUN SERPL-MCNC: 14 MG/DL (ref 7–30)
CALCIUM SERPL-MCNC: 8.9 MG/DL (ref 8.5–10.1)
CHLORIDE SERPL-SCNC: 103 MMOL/L (ref 94–109)
CO2 SERPL-SCNC: 30 MMOL/L (ref 20–32)
CREAT SERPL-MCNC: 0.84 MG/DL (ref 0.66–1.25)
DIFFERENTIAL METHOD BLD: ABNORMAL
EOSINOPHIL # BLD AUTO: 0.1 10E9/L (ref 0–0.7)
EOSINOPHIL NFR BLD AUTO: 2 %
ERYTHROCYTE [DISTWIDTH] IN BLOOD BY AUTOMATED COUNT: 15.1 % (ref 10–15)
GFR SERPL CREATININE-BSD FRML MDRD: >90 ML/MIN/{1.73_M2}
GLUCOSE SERPL-MCNC: 118 MG/DL (ref 70–99)
HCT VFR BLD AUTO: 41.5 % (ref 40–53)
HGB BLD-MCNC: 13.7 G/DL (ref 13.3–17.7)
IMM GRANULOCYTES # BLD: 0 10E9/L (ref 0–0.4)
IMM GRANULOCYTES NFR BLD: 0.4 %
LYMPHOCYTES # BLD AUTO: 0.6 10E9/L (ref 0.8–5.3)
LYMPHOCYTES NFR BLD AUTO: 23.5 %
MCH RBC QN AUTO: 28.2 PG (ref 26.5–33)
MCHC RBC AUTO-ENTMCNC: 33 G/DL (ref 31.5–36.5)
MCV RBC AUTO: 85 FL (ref 78–100)
MONOCYTES # BLD AUTO: 0.2 10E9/L (ref 0–1.3)
MONOCYTES NFR BLD AUTO: 7.1 %
NEUTROPHILS # BLD AUTO: 1.7 10E9/L (ref 1.6–8.3)
NEUTROPHILS NFR BLD AUTO: 66.2 %
NRBC # BLD AUTO: 0 10*3/UL
NRBC BLD AUTO-RTO: 0 /100
PLATELET # BLD AUTO: 221 10E9/L (ref 150–450)
POTASSIUM SERPL-SCNC: 4 MMOL/L (ref 3.4–5.3)
RBC # BLD AUTO: 4.86 10E12/L (ref 4.4–5.9)
SODIUM SERPL-SCNC: 137 MMOL/L (ref 133–144)
WBC # BLD AUTO: 2.6 10E9/L (ref 4–11)

## 2019-04-20 PROCEDURE — 96374 THER/PROPH/DIAG INJ IV PUSH: CPT

## 2019-04-20 PROCEDURE — 96375 TX/PRO/DX INJ NEW DRUG ADDON: CPT

## 2019-04-20 PROCEDURE — 25000128 H RX IP 250 OP 636: Performed by: EMERGENCY MEDICINE

## 2019-04-20 PROCEDURE — 70450 CT HEAD/BRAIN W/O DYE: CPT

## 2019-04-20 PROCEDURE — 99285 EMERGENCY DEPT VISIT HI MDM: CPT | Mod: 25

## 2019-04-20 PROCEDURE — 85025 COMPLETE CBC W/AUTO DIFF WBC: CPT | Performed by: EMERGENCY MEDICINE

## 2019-04-20 PROCEDURE — 80048 BASIC METABOLIC PNL TOTAL CA: CPT | Performed by: EMERGENCY MEDICINE

## 2019-04-20 PROCEDURE — 25800030 ZZH RX IP 258 OP 636: Performed by: EMERGENCY MEDICINE

## 2019-04-20 PROCEDURE — 96361 HYDRATE IV INFUSION ADD-ON: CPT

## 2019-04-20 RX ORDER — ACETAMINOPHEN 500 MG
500-1000 TABLET ORAL EVERY 8 HOURS PRN
Qty: 30 TABLET | Refills: 0 | Status: SHIPPED | OUTPATIENT
Start: 2019-04-20

## 2019-04-20 RX ORDER — DIPHENHYDRAMINE HYDROCHLORIDE 50 MG/ML
25 INJECTION INTRAMUSCULAR; INTRAVENOUS ONCE
Status: COMPLETED | OUTPATIENT
Start: 2019-04-20 | End: 2019-04-20

## 2019-04-20 RX ORDER — SODIUM CHLORIDE 9 MG/ML
INJECTION, SOLUTION INTRAVENOUS CONTINUOUS
Status: DISCONTINUED | OUTPATIENT
Start: 2019-04-20 | End: 2019-04-20 | Stop reason: HOSPADM

## 2019-04-20 RX ORDER — PROMETHAZINE HYDROCHLORIDE 25 MG/ML
12.5 INJECTION, SOLUTION INTRAMUSCULAR; INTRAVENOUS ONCE
Status: DISCONTINUED | OUTPATIENT
Start: 2019-04-20 | End: 2019-04-20

## 2019-04-20 RX ORDER — KETOROLAC TROMETHAMINE 15 MG/ML
15 INJECTION, SOLUTION INTRAMUSCULAR; INTRAVENOUS ONCE
Status: COMPLETED | OUTPATIENT
Start: 2019-04-20 | End: 2019-04-20

## 2019-04-20 RX ORDER — IBUPROFEN 200 MG
600 TABLET ORAL EVERY 8 HOURS PRN
Qty: 30 TABLET | Refills: 0 | Status: SHIPPED | OUTPATIENT
Start: 2019-04-20

## 2019-04-20 RX ADMIN — SODIUM CHLORIDE 1000 ML: 9 INJECTION, SOLUTION INTRAVENOUS at 17:42

## 2019-04-20 RX ADMIN — PROMETHAZINE HYDROCHLORIDE 12.5 MG: 25 INJECTION INTRAMUSCULAR; INTRAVENOUS at 17:43

## 2019-04-20 RX ADMIN — DIPHENHYDRAMINE HYDROCHLORIDE 25 MG: 50 INJECTION, SOLUTION INTRAMUSCULAR; INTRAVENOUS at 17:42

## 2019-04-20 RX ADMIN — KETOROLAC TROMETHAMINE 15 MG: 15 INJECTION, SOLUTION INTRAMUSCULAR; INTRAVENOUS at 17:41

## 2019-04-20 ASSESSMENT — ENCOUNTER SYMPTOMS
VOMITING: 0
HEADACHES: 1
FEVER: 0
NAUSEA: 0
APPETITE CHANGE: 1
CHILLS: 1

## 2019-04-20 NOTE — ED AVS SNAPSHOT
[facility] Emergency Department  [address]  [facility] [address]  Phone:  [phone]  Fax:  [phone]                                    Uri Valladares   MRN: [id number]    Department:  [facility] Emergency Department   Date of Visit:  4/20/2019           After Visit Summary Signature Page    I have received my discharge instructions, and my questions have been answered. I have discussed any challenges I see with this plan with the nurse or doctor.    ..........................................................................................................................................  Patient/Patient Representative Signature      ..........................................................................................................................................  Patient Representative Print Name and Relationship to Patient    ..................................................               ................................................  Date                                   Time    ..........................................................................................................................................  Reviewed by Signature/Title    ...................................................              ..............................................  Date                                               Time          22EPIC Rev 08/18

## 2019-04-20 NOTE — ED PROVIDER NOTES
"  History     Chief Complaint:  Headache    HPI   Uri Valladares is a 28 year old male who presents with headache. The patient reports that he has been experiencing a \"migraine\" for the past 4 days, although admits that he has never been formally diagnosed. He notes that he does not usually get headaches and has never been diagnosed with migraines. He was woken up abruptly by his daughter 4 days ago and stood up quickly, which is when his headache began. He reports pain behind his left eye and body chills. The patient started tapering his Suboxone 5 days ago, reducing his dosage to 12 mg. The patient denies any vomiting.     He took 3 tablets of ibuprofen last night for his pain.     Allergies:  No known drug allergies     Medications:    Suboxone  Nizoral  Claritin  Melatonin  Bactroban  Nicoderm   Nicotine mini   Cepastat  Miralax  Senokot     Past Medical History:    Chemical Dependency  Opioid Withdrawal  Suicidal Ideation  Depressive Disorder    Past Surgical History:    History reviewed. No pertinent surgical history.    Family History:    Mother: Substance abuse, depression    Social History:  Smoking Status: Light Tobacco Smoker  Alcohol Use: Yes  Patient presents with another person.  Marital Status:  Single      Review of Systems   Constitutional: Positive for appetite change and chills. Negative for fever.   Gastrointestinal: Negative for nausea and vomiting.   Neurological: Positive for headaches.   All other systems reviewed and are negative.        Physical Exam   First Vitals:  BP: 127/85  Pulse: 81  Heart Rate: 86  Temp: 98.6  F (37  C)  Resp: 18  SpO2: 99 %      Physical Exam  General: Alert. Comfortable  HEENT:   The scalp and head appear normal    The pupils are equal, round, and reactive to light    Extraocular muscles are intact.    The nose is normal.    The oropharynx is normal.      Uvula is in the midline.    Neck:  Normal range of motion. No neck tenderness, no meningismus.  Lungs:  Clear.  "     No rales, no wheezing.      There is no tachypnea.  Non-labored.  Cardiac: Regular rate.      Normal S1 and S2.      No pathological murmur/rub    Abdomen: Soft. No distension, no localized tenderness or rebound.  MS:  Normal tone. Normal movement of all extremities.   Neurologic:     Normal mentation.  No cranial nerve deficits.  No focal motor or sensory                            changes.      Speech normal.  Psych:  Awake.     Alert.      Normal affect.      Appropriate interactions.  Skin:  No rash.      No lesions.    Emergency Department Course     Imaging:  Radiographic findings were communicated with the patient who voiced understanding of the findings.  Head CT w/o contrast  Normal head CT.  As read by Radiology.    Laboratory:  CBC: WBC 2.6 (L) o/w WNL (HGB 13.7, )  BMP: Glucose 118 (H) o/w WNL (Creatinine 0.84)    Interventions:  1742 NS 1L IV Bolus  1741 Toradol 15 mg IV  1742 Benadryl 25 mg IV  1743 Phenergan 12.5 mg IV    Emergency Department Course:  Past medical records, nursing notes, and vitals reviewed.  1656: I performed an exam of the patient and obtained history, as documented above.    The patient was sent for a Head CT while in the emergency department, findings above.    IV inserted and blood drawn.    Medication was given.     1930: I rechecked the patient.  Findings and plan explained to the Patient. Patient discharged home with instructions regarding supportive care, medications, and reasons to return. The importance of close follow-up was reviewed.       Impression & Plan      Medical Decision Making:  Uri Valladares is a 28 year old male who presents for headache. He stated that it was the worse headache of his life. It is localized to the right parietal region. CT scan was negative. We discussed doing a lumbar tap to rule out small subarachnoid hemorrhage or meningitis which he understands per our discussion can be life-threatening. I discussed with him that his white blood  cell count was very low compared to what it normally is to suggest a viral process. He has a lymphocytic predominance on his serum white count. His partner also states that she had a cold last week with a cough and a low grade fever. The patient adamantly refused the spinal tap. I did discuss with him that there is still a risk of a small bleed that is undiagnosed without that study being done and that this could be life threatening. He still refused to have the spinal tap and said that he would rather follow up with his doctor.  It is my medical opinion that this person has decision making capacity to understand the risks of forgoing the spinal tap.  At this point I am recommending that he follow up with his primary medical doctor for reevaluation on Monday. If in the interim he should develop worsening symptoms, he promised to come back or if he changed his mind, I recommended that he come back.     Diagnosis:    ICD-10-CM    1. Nonintractable headache, unspecified chronicity pattern, unspecified headache type R51        Disposition:  discharged to home    Discharge Medications:     Medication List      Started    acetaminophen 500 MG tablet  Commonly known as:  TYLENOL  500-1,000 mg, Oral, EVERY 8 HOURS PRN     ibuprofen 200 MG tablet  Commonly known as:  ADVIL/MOTRIN  600 mg, Oral, EVERY 8 HOURS PRN          Geneva Hoff  4/20/2019   [facility] EMERGENCY DEPARTMENT  I, Geneva Hoff, am serving as a scribe at 4:56 PM on 4/20/2019 to document services personally performed by Ivan Cruz MD based on my observations and the provider's statements to me.        Ivan Cruz MD  04/21/19 3004

## 2019-04-20 NOTE — ED TRIAGE NOTES
Pt presents with headache x4 days. L sided into L neck. Denies N/V, photophobia, but states movement makes it worse. ABCs intact. No medications taken, states takes suboxone and doesn't like to mix medications with it.

## 2019-04-21 NOTE — DISCHARGE INSTRUCTIONS
Discharge Instructions  Headache    You were seen today for a headache. Headaches may be caused by many different things such as muscle tension, sinus inflammation, anxiety and stress, having too little sleep, too much alcohol, some medical conditions or injury. You may have a migraine, which is caused by changes in the blood vessels in your head.  At this time your doctor does not find that your headache is a sign of anything dangerous or life-threatening.  However, sometimes the signs of serious illness do not show up right away.  If you have new or worse symptoms, you may need to be seen again in the emergency department or by your primary doctor.      Return to the Emergency Department if:  You get a fever of 101 F or higher.  Your headache gets much worse.  You get a stiff neck with your headache.  You get a new headache that is different or worse than headaches you have had before.  You are vomiting and can?t keep food or water down.  You have blurry or double vision or other problems with your eyes.  You have a new weakness on one side of your body.  You have difficulty with balance which is new.  You or your family thinks you are confused.  You have a seizure or convulsion.    What can I do to help myself?  Pain medications - You may take a pain medication such as Tylenol  (acetaminophen), Advil , Nuprin  (ibuprofen) or Aleve  (naproxen).  If you have been given a narcotic such as Vicodin  (hydrocodone with acetaminophen), Percocet  (oxycodone with acetaminophen), codeine, or a muscle relaxant such as Flexeril  (cyclobenzaprine) or Soma  (carisoprodol), do not drive for four hours after you have taken it. If the narcotic contains Tylenol  (acetaminophen), do not take Tylenol  with it. All narcotics will cause constipation, so eat a high fiber diet.      Take a pain reliever as soon as you notice symptoms.  Starting medications as soon as you start to have symptoms may lessen the amount of pain you  have.  Relaxing in a quiet, dark room may help.  Get enough sleep and eat meals regularly.  Schedule an appointment with your primary physician as instructed, or at least within 1 week.  You may need to watch for certain foods or other things which may trigger your headaches.  Keeping a journal of your headaches and possible triggers may help you and your primary doctor to identify things which you should avoid which may be causing your headaches.  If you were given a prescription for medicine here today, be sure to read all of the information (including the package insert) that comes with your prescription.  This will include important information about the medicine, its side effects, and any warnings that you need to know about.  The pharmacist who fills the prescription can provide more information and answer questions you may have about the medicine.  If you have questions or concerns that the pharmacist cannot address, please call or return to the Emergency Department.   Opioid Medication Information    Pain medications are among the most commonly prescribed medicines, so we are including this information for all our patients. If you did not receive pain medication or get a prescription for pain medicine, you can ignore it.     You may have been given a prescription for an opioid (narcotic) pain medicine and/or have received a pain medicine while here in the Emergency Department. These medicines can make you drowsy or impaired. You must not drive, operate dangerous equipment, or engage in any other dangerous activities while taking these medications. If you drive while taking these medications, you could be arrested for DUI, or driving under the influence. Do not drink any alcohol while you are taking these medications.     Opioid pain medications can cause addiction. If you have a history of chemical dependency of any type, you are at a higher risk of becoming addicted to pain medications.  Only take these  prescribed medications to treat your pain when all other options have been tried. Take it for as short a time and as few doses as possible. Store your pain pills in a secure place, as they are frequently stolen and provide a dangerous opportunity for children or visitors in your house to start abusing these powerful medications. We will not replace any lost or stolen medicine.  As soon as your pain is better, you should flush all your remaining medication.     Many prescription pain medications contain Tylenol  (acetaminophen), including Vicodin , Tylenol #3 , Norco , Lortab , and Percocet .  You should not take any extra pills of Tylenol  if you are using these prescription medications or you can get very sick.  Do not ever take more than 3000 mg of acetaminophen in any 24 hour period.    All opioids tend to cause constipation. Drink plenty of water and eat foods that have a lot of fiber, such as fruits, vegetables, prune juice, apple juice and high fiber cereal.  Take a laxative if you don?t move your bowels at least every other day. Miralax , Milk of Magnesia, Colace , or Senna  can be used to keep you regular.      Remember that you can always come back to the Emergency Department if you are not able to see your regular doctor in the amount of time listed above, if you get any new symptoms, or if there is anything that worries you.

## 2019-04-24 ENCOUNTER — PATIENT OUTREACH (OUTPATIENT)
Dept: ADDICTION MEDICINE | Facility: CLINIC | Age: 29
End: 2019-04-24

## 2019-04-24 NOTE — PROGRESS NOTES
Clinic Care Coordination Contact 4/24/19  Care Team Conversations    SW spoke with pt to inquire if still interested in participating in co-coordinated care. Pt still shows interest.    Pt will follow up with SW later today/this week after they check their work schedue and the will reschedule intake with SW in he coming week.    Plan: Follow up with pt on Monday 4/29 to reschedule intake.    TALIB Mackey   Ambulatory Care Coordination [facility]  Phone: [phone]  Email: [email or web address]  4/24/2019 11:43 AM

## 2019-04-29 ENCOUNTER — PATIENT OUTREACH (OUTPATIENT)
Dept: ADDICTION MEDICINE | Facility: CLINIC | Age: 29
End: 2019-04-29

## 2019-04-29 NOTE — PROGRESS NOTES
Clinic Care Coordination Contact 4/29/19  Care Team Conversations    LEEANN chart reviewed and notes that pt has appt with Dr. Bardales on 5/15 at 11:40am.    LEEANN added self to schedule to meet with pt following Dr. Bardales at 12:00pm.    LEEANN left vm on pt's phone to inform them and to contact this SW to confirm or make any adjustments.    Plan: LEEANN will contact pt on 5/13/19 to confirm appointments.     TALIB Mackey   Ambulatory Care Coordination [facility]  Phone: [phone]  Email: [email or web address]  4/29/2019 2:00 PM

## 2019-05-13 ENCOUNTER — PATIENT OUTREACH (OUTPATIENT)
Dept: ADDICTION MEDICINE | Facility: CLINIC | Age: 29
End: 2019-05-13

## 2019-05-13 NOTE — PROGRESS NOTES
Clinic Care Coordination Contact 5/13/19  Care Team Conversations    SW contacted and spoke with pt to remind them of upcoming appt this Wednesday at 11:20am with Dr. Bardales and this SW following at 12pm.    Plan: LEEANN to meet with pt for intake this Wednesday at 12:00pm    TALIB Mackey   Ambulatory Care Coordination [facility]  Phone: [phone]  Email: [email or web address]  5/13/2019 11:02 AM

## 2019-05-15 ENCOUNTER — APPOINTMENT (OUTPATIENT)
Dept: ADDICTION MEDICINE | Facility: CLINIC | Age: 29
End: 2019-05-15
Payer: COMMERCIAL

## 2019-05-15 ENCOUNTER — OFFICE VISIT (OUTPATIENT)
Dept: ADDICTION MEDICINE | Facility: CLINIC | Age: 29
End: 2019-05-15
Payer: COMMERCIAL

## 2019-05-15 ENCOUNTER — PATIENT OUTREACH (OUTPATIENT)
Dept: ADDICTION MEDICINE | Facility: CLINIC | Age: 29
End: 2019-05-15

## 2019-05-15 VITALS
HEIGHT: 74 IN | BODY MASS INDEX: 19.89 KG/M2 | RESPIRATION RATE: 14 BRPM | SYSTOLIC BLOOD PRESSURE: 122 MMHG | OXYGEN SATURATION: 100 % | DIASTOLIC BLOOD PRESSURE: 72 MMHG | HEART RATE: 79 BPM | WEIGHT: 155 LBS

## 2019-05-15 DIAGNOSIS — K59.03 DRUG-INDUCED CONSTIPATION: ICD-10-CM

## 2019-05-15 DIAGNOSIS — F33.9 EPISODE OF RECURRENT MAJOR DEPRESSIVE DISORDER, UNSPECIFIED DEPRESSION EPISODE SEVERITY (H): ICD-10-CM

## 2019-05-15 DIAGNOSIS — F11.20 OPIOID USE DISORDER, SEVERE, DEPENDENCE (H): Primary | ICD-10-CM

## 2019-05-15 DIAGNOSIS — Z87.891 FORMER SMOKER: ICD-10-CM

## 2019-05-15 DIAGNOSIS — Z79.899 HIGH RISK MEDICATION USE: ICD-10-CM

## 2019-05-15 PROCEDURE — 99215 OFFICE O/P EST HI 40 MIN: CPT | Performed by: PEDIATRICS

## 2019-05-15 PROCEDURE — 80306 DRUG TEST PRSMV INSTRMNT: CPT | Performed by: PEDIATRICS

## 2019-05-15 RX ORDER — BUPRENORPHINE AND NALOXONE 8; 2 MG/1; MG/1
FILM, SOLUBLE BUCCAL; SUBLINGUAL
Qty: 45 FILM | Refills: 0 | Status: SHIPPED | OUTPATIENT
Start: 2019-05-15 | End: 2019-06-04

## 2019-05-15 ASSESSMENT — MIFFLIN-ST. JEOR: SCORE: 1737.83

## 2019-05-15 NOTE — PROGRESS NOTES
SUBJECTIVE:                                                    BUPRENORPHINE FOLLOW UP:    Uri Valladares is a 28 year old male who presents to clinic today for follow up of Buprenorphine.      Date of last visit:  4/15/2019    [place] Board of Pharmacy Data Base Reviewed:    Yes ;     4/16/2019 Suboxone 8 mg film #45        Brief History:     Initial visit 7/19/18  Detox 7/7/18-7/10/18  Using opioid off and on for 10 yr.   More recently tolerance increasing to the poin of Percocet/Fentanyl  up to 90-100mcg/day snorting.  No IV use.  Moderated hx of alcohol use.  Quit smoking in detox.  Hx of depression.             HPI:    5/15/2019  Has been struggling with weight.  Is drinking Ensure daily.  Has always been somewhat thin.  Decreased Suboxone to 12 mg daily at last visit.  Did not seem to make much difference in his appetite.  He was seen in the ED about 4 days after decreasing Suboxone with a persistent severe headache.  He ultimately feels it was a viral illness.  He has not had a recurrence.  No previous history of migraine.  He is here today with his 2-year-old son who is doing well.  Continuing to attend meetings.  Is almost a year sober.  Would like to continue at the current dose for now.  Only smoking occasionally.  Still struggles with constipation at times.  Has a vegan diet.  Is not very good about taking medications and resist taking MiraLAX.          Wt Readings from Last 4 Encounters:   05/15/19 70.3 kg (155 lb)   04/15/19 72.6 kg (160 lb)   02/08/19 74.4 kg (164 lb)   01/17/19 74.2 kg (163 lb 8 oz)         Social History     Social History Narrative    Most recently living in car and some with mother.   Court recent for disorderly conduct and Hx of DUI few months ago (just need to take class to clear).          3 children  (8yr, 5yr and 2yr.)  Youngest lives here.  Two oldest live in [facility].         Patient Active Problem List    Diagnosis Date Noted     Chemical dependency (H)  07/10/2018     Priority: Medium     Opioid withdrawal (H) 07/07/2018     Priority: Medium     Suicidal ideation 05/15/2017     Priority: Medium       Problem list and histories reviewed & adjusted, as indicated.  Additional history: as documented        Current Outpatient Medications on File Prior to Visit:  acetaminophen (TYLENOL) 500 MG tablet Take 1-2 tablets (500-1,000 mg) by mouth every 8 hours as needed for mild pain   buprenorphine HCl-naloxone HCl (SUBOXONE) 8-2 MG per film Place 1 Film under the tongue 2 times daily One film q am and half film q pm [id number]   buprenorphine-naloxone (SUBOXONE) 8-2 MG SUBL sublingual tablet One tab daily am and 1 tab daily q pm. [id number]   ibuprofen (ADVIL/MOTRIN) 200 MG tablet Take 3 tablets (600 mg) by mouth every 8 hours as needed for mild pain   ketoconazole (NIZORAL) 2 % cream Apply topically 2 times daily   ketoconazole (NIZORAL) 2 % shampoo Apply to the affected area and wash off after 5 minutes.   loratadine (CLARITIN) 10 MG tablet Take 10 mg by mouth daily as needed for allergies   melatonin 3 MG CAPS Take 6 mg by mouth At Bedtime   MELATONIN PO Take 3 mg by mouth nightly as needed   mupirocin (BACTROBAN) 2 % ointment Place in nose 2 x day until sx resolve   nicotine (NICODERM CQ) 7 MG/24HR 24 hr patch Place 1 patch onto the skin every 24 hours   nicotine polacrilex (NICOTINE MINI) 2 MG lozenge Place 1 lozenge (2 mg) inside cheek every hour as needed for smoking cessation   phenol-menthol (CEPASTAT) 14.5 MG lozenge Place 1 lozenge inside cheek every 2 hours as needed for moderate pain   polyethylene glycol (MIRALAX) powder Take 17 g (1 capful) by mouth daily   senna-docusate (SENOKOT-S;PERICOLACE) 8.6-50 MG per tablet Take 2 tablets by mouth daily as needed for constipation     Current Facility-Administered Medications on File Prior to Visit:  Self Administer Medications: Behavioral Services       No Known Allergies              REVIEW OF SYSTEMS:  General: No acute  "withdrawal symptoms.  No recent infections or fever  Eyes:  No vision concerns.  No double vision.    Resp: No coughing, wheezing or shortness of breath  CV: No chest pains or palpitations  GI: No nausea, vomiting, abdominal pain, diarrhea.  intermittent constipation as above  : No urinary frequency or dysuria,     Musculoskeletal: No significant muscle or joint pains other than as above.  No edema  Neurologic: No numbness, tingling, weakness, problems with balance or coordination  Psychiatric: No acute concerns other than as above.   Skin: No rashes or areas of acute infection    OBJECTIVE:    PHYSICAL EXAM:  /72   Pulse 79   Resp 14   Ht 1.88 m (6' 2\")   Wt 70.3 kg (155 lb)   SpO2 100%   BMI 19.90 kg/m      GENERAL APPEARANCE:  alert, comfortable appearing  EYES:Eyes grossly normal to inspection  NEURO:  Gait normal.  No tremor. Coordination intact.   MENTAL STATUS EXAM:  Appearance/Behavior: No appearant distress  Speech: Normal  Mood/Affect: normal affect  Insight: Adequate      Results for orders placed or performed in visit on 05/15/19   Urine Drugs of Abuse Screen Panel 13   Result Value Ref Range    Cannabinoids (50-hbg-8-carboxy-9-THC) Not Detected NDET^Not Detected ng/mL    Phencyclidine (Phencyclidine) Not Detected NDET^Not Detected ng/mL    Cocaine (Benzoylecgonine) Not Detected NDET^Not Detected ng/mL    Methamphetamine (d-Methamphetamine) Not Detected NDET^Not Detected ng/mL    Opiates (Morphine) Not Detected NDET^Not Detected ng/mL    Amphetamine (d-Amphetamine) Not Detected NDET^Not Detected ng/mL    Benzodiazepines (Nordiazepam) Not Detected NDET^Not Detected ng/mL    Tricyclic Antidepressants (Desipramine) Not Detected NDET^Not Detected ng/mL    Methadone (Methadone) Not Detected NDET^Not Detected ng/mL    Barbiturates (Butalbital) Not Detected NDET^Not Detected ng/mL    Oxycodone (Oxycodone) Not Detected NDET^Not Detected ng/mL    Propoxyphene (Norpropoxyphene) Not Detected NDET^Not " Detected ng/mL    Buprenorphine (Buprenorphine) Detected, Abnormal Result (A) NDET^Not Detected ng/mL           ASSESSMENT/PLAN:            F11.23) Opioid withdrawal (H)  (primary encounter diagnosis)      Plan: buprenorphine-naloxone (SUBOXONE) 8-2 MG SUBL         sublingual -will attempt to change to film at patient request to facilitate tapering.    Continue current dose 12 mg daily.  This may be given in 8 mg in a.m. and 4 mg p.m. for 4 mg 3 times daily at patient preference.    Encouraged follow-up with any increased craving.  Monitor for appetite improvement.    Encouraged regular diet and supplementation.  Iron and protein sources reviewed.     No current symptoms follow up 4 wk.      Suboxone risk/benefit/side effect and intended purposes reviewed.        Opioid warning reviewed.  Risk of overdose following a period of abstinence due to decrease tolerance was discussed including risk of death. Risk of overdose if using Suboxone with other substances particuarly benzodiazepines/alcohol was reviewed.       Counseled the patient on the importance of having a recovery program in addition to Medication assisted treatment.  Components include having some type of sober network, avoiding isolating, having willingness  to change, avoiding triggers and managing cravings.    Encouraged other services such as counseling, 12 step or other self-help organizations.        Strongly recommended abstain from alcohol, benzodiazepines, THC, opioids and other drugs of abuse.  Increased risk of relapse for opioids with use of these substances discussed.  Increased risk of overdose/death with use of other substances particularly benzodiazepines/alcohol reviewed.          (F33.9) Episode of recurrent major depressive disorder, unspecified depression episode severity (H)  Plan: will need follow up PCP. Monitor for need for  medications.   Encouraged mental health counseling      (K59.03)  Constipation -miralax 17gm daily.  Titrate  amount as needed.  High fiber diet, increase fluid intake.  Follow up prn.  Reviewed other dietary management options such as parents produce increasing fluid intake etc.      (Z87.481) Nicotine use disorder. .   Plan:  Encouraged continued nicotine Abstinence.  Increase risk of relapse with other substances with nicotine use discussed.    Risk of Ecig/Vaping also reviewed.    Other nicotine cessation such as lozenge, gum, patch reviewed.   Chantix also discussed. Risks, benefits, side effects and intended purposes discussed.  Other supports such as Quit plan reviewed.             (Z86.013) High risk medication use           ENCOUNTER FOR LONG TERM USE OF HIGH RISK MEDICATION   High Risk Drug Monitoring?  YES   Drug being monitored: Buprenorphine   Reason for drug: Opioid use disorder   What is being monitored?: Dosage, Cravings, Trigger, side effects, and continued abstinence.      Opioid warning reviewed.  Risk of overdose following a period of abstinence due to decrease tolerance was discussed including risk of death.   Risk of overdose if using Suboxone with other substances particuarly benzodiazepines/alcohol was reviewed.        Sera Bardales MD  [facility]  [phone]

## 2019-05-15 NOTE — PROGRESS NOTES
Clinic Care Coordination Contact 5/15/19  Care Team Conversations    SW went to meet with pt for scheduled appt for intake at 12pm after pt's follow up with Dr. Bardales at 11:20am.    SW was informed that pt had left the clinic after Beatriz appt.     LEEANN notes on 5/13 pt and this SW spoke and pt was reminded of the upcoming appts for this morning and early afternoon.    This SW has removed themselves from pt's Care Team until further notice. LEEANN also mailed letter to remind pt of co-coordinated care program should they want to engage in the future.    Kay Gasca [facility]   Ambulatory Care Coordination [facility]  Phone: [phone]  Email: [email or web address]  5/15/2019 12:28 PM

## 2019-05-15 NOTE — LETTER
5/15/2019        RE: Uri Valladares  [address] [address]        Hello,    My name is Kay Gasca. I am the  with [facility] addiction and pain clinic. I am contacting you about our [facility] program - Medication Assisted Treatment - Prescription Drug Opioid Addiction. This program includes working with a physician, (soon to be) , social work care coordination and therapy support for people in the  community.    You have been identified as a patient in the pain and/or addiction clinic. I would like to arrange a time to meet with you and ask you some questions regarding your opioid use. This study is completely confidential and will not be documented on your medical record nor reported to entities outside of this study.    You will be compensated for your time throughout each portion of the study with [employer] gift cards.     Intake (30-45 minutes) - $10 gift card     6 month follow up (30 minutes) - $30 gift card    12 month follow up (30 minutes) - $30 gift card    If you have any additional questions and are interested in taking part in this questionnaire study, please contact me through the [facility] Intake line at  [phone]      Sincerely,        TALIB Mackey

## 2019-06-03 ENCOUNTER — TELEPHONE (OUTPATIENT)
Dept: ADDICTION MEDICINE | Facility: CLINIC | Age: 29
End: 2019-06-03

## 2019-06-03 DIAGNOSIS — F11.20 OPIOID USE DISORDER, SEVERE, DEPENDENCE (H): ICD-10-CM

## 2019-06-03 NOTE — TELEPHONE ENCOUNTER
Reason for Call:  Subx bridge    Do you use a [facility]?  Name of the pharmacy and phone number for the current request:  [facility] - [phone]    Name of the medication requested: buprenorphine HCl-naloxone HCl (SUBOXONE) 8-2 MG per film    Info: Pt stated that he has been taking extra (2 per day instead of 1.5 as prescribed due to an increase in cravings and feeling like he isn't taking anything)    Other request:   Scheduled appointment: 6/12/19 @ 11:20  None to take as of: (pt has 2 full strips left)  Date/Time of day of last dose:6/4/19    Pt informed to follow up with pharmacy for status of refill as addiction RN will only reach out if there are any issues or questions and will be addressed within one business day.    Can we leave a detailed message on this number? YES    Phone number patient can be reached at: Home number on file [phone] (home)    Best Time: anytime    Call taken on 6/3/2019 at 10:29 AM by Ting Horton

## 2019-06-04 RX ORDER — BUPRENORPHINE AND NALOXONE 8; 2 MG/1; MG/1
FILM, SOLUBLE BUCCAL; SUBLINGUAL
Qty: 16 FILM | Refills: 0 | Status: SHIPPED | OUTPATIENT
Start: 2019-06-04 | End: 2019-06-12

## 2019-06-04 NOTE — TELEPHONE ENCOUNTER
RX pended with QTY and sig for the last prescribed dose.   Refill for: Suboxone *Patient requesting higher dose and he has been using more w/o discussing it with provider. If using 2 per day, he should be on films 42 and 43 today of 45. Out 11 days early if he has none left today.     Last Appointment: 5/15/19    Next Appointment: 6/12/19     No Shows/Cancellations since last appointment:  none    Last Refill in Epic (date and amount/how many days): 30 days  buprenorphine HCl-naloxone HCl (SUBOXONE) 8-2 MG per film 45 Film 0 5/15/2019  No   Sig: One film q am and half film q pm [id number]   Sent to pharmacy as: buprenorphine HCl-naloxone HCl (SUBOXONE) 8-2 MG per film   Class: E-Prescribe         Most Recent UDS results: 5/15/19 POS for buprenorphine     reviewed and summarized below:   Fill Date Written    Drug   Qty Days Prescriber   05/15/2019 05/15/2019 Buprenorp-Nalox 8-2 Mg Sl Film 45 30 Ei Bur   04/16/2019 04/15/2019 Suboxone 8 Mg-2 Mg Sl Film    45 30        EI Bur

## 2019-06-04 NOTE — TELEPHONE ENCOUNTER
Dose changed to one film 2 x day and bridge rx.  Please advise patient to follow this dose and not self adjust.  Will discuss further at next appt.

## 2019-06-05 NOTE — TELEPHONE ENCOUNTER
Patient notified and agreed to follow dosing and plan from .    Candice Blanco RN  06/05/19  9:15 AM

## 2019-06-12 ENCOUNTER — OFFICE VISIT (OUTPATIENT)
Dept: ADDICTION MEDICINE | Facility: CLINIC | Age: 29
End: 2019-06-12
Payer: COMMERCIAL

## 2019-06-12 VITALS
DIASTOLIC BLOOD PRESSURE: 72 MMHG | HEIGHT: 74 IN | BODY MASS INDEX: 19.51 KG/M2 | OXYGEN SATURATION: 99 % | WEIGHT: 152 LBS | SYSTOLIC BLOOD PRESSURE: 124 MMHG | RESPIRATION RATE: 16 BRPM | HEART RATE: 84 BPM

## 2019-06-12 DIAGNOSIS — Z79.899 HIGH RISK MEDICATION USE: ICD-10-CM

## 2019-06-12 DIAGNOSIS — F11.20 OPIOID USE DISORDER, SEVERE, DEPENDENCE (H): Primary | ICD-10-CM

## 2019-06-12 DIAGNOSIS — K59.03 DRUG-INDUCED CONSTIPATION: ICD-10-CM

## 2019-06-12 DIAGNOSIS — F33.9 EPISODE OF RECURRENT MAJOR DEPRESSIVE DISORDER, UNSPECIFIED DEPRESSION EPISODE SEVERITY (H): ICD-10-CM

## 2019-06-12 DIAGNOSIS — Z87.891 FORMER SMOKER: ICD-10-CM

## 2019-06-12 PROCEDURE — 80306 DRUG TEST PRSMV INSTRMNT: CPT | Performed by: PEDIATRICS

## 2019-06-12 PROCEDURE — 99214 OFFICE O/P EST MOD 30 MIN: CPT | Performed by: PEDIATRICS

## 2019-06-12 RX ORDER — BUPRENORPHINE AND NALOXONE 8; 2 MG/1; MG/1
FILM, SOLUBLE BUCCAL; SUBLINGUAL
Qty: 60 FILM | Refills: 0 | Status: SHIPPED | OUTPATIENT
Start: 2019-06-12 | End: 2019-07-22

## 2019-06-12 ASSESSMENT — MIFFLIN-ST. JEOR: SCORE: 1724.22

## 2019-06-12 NOTE — PROGRESS NOTES
SUBJECTIVE:                                                    BUPRENORPHINE FOLLOW UP:    Uri Valladares is a 28 year old male who presents to clinic today for follow up of Buprenorphine.      Date of last visit:  6/3/2019    [place] Board of Pharmacy Data Base Reviewed:    Yes ;     6/5/19 Suboxone 8mg film #16  (8)       Brief History:  Initial visit 7/19/18  Detox 7/7/18-7/10/18  Using opioid off and on for 10 yr.   More recently tolerance increasing to the poin of Percocet/Fentanyl  up to 90-100mcg/day snorting.  No IV use.  Moderated hx of alcohol use.  Quit smoking in detox.  Hx of depression.             HPI:  6/12/2019  Had tried to decrease Suboxone to 12 mg but then went back to 16mg.  Did not call to get increased and thus run out early.  Did call for bridge.  Feels that he is doing better on the 8 mg twice a day but continues to have a lot of ambivalence about wanting to be off medication and or tapering.  One of his primary concerns is low weight.  He feels the Suboxone does adversely affect his appetite although he can remind himself to eat.  He worries about being too skinny.  This is been a long-standing issue.    Two yr old son doing well.  He does have some temper tantrums and age-appropriate behaviors that were discussed at length today.  He is now almost a year sober.  He occasionally is going to meetings but not much recently.  He feels he does not see himself going back to substance use and feels that he would return to a program of recovery if he needed it.  Only smoking occasionally.  Still struggles with constipation at times.  Has a vegan diet.  Is not very good about taking medications and resist taking MiraLAX.        Social History     Social History Narrative    Most recently living in car and some with mother.   Court recent for disorderly conduct and Hx of DUI few months ago (just need to take class to clear).          3 children  (8yr, 5yr and 2yr.)  Youngest lives here.  Two  oldest live in [facility].         Patient Active Problem List    Diagnosis Date Noted     Chemical dependency (H) 07/10/2018     Priority: Medium     Opioid withdrawal (H) 07/07/2018     Priority: Medium     Suicidal ideation 05/15/2017     Priority: Medium       Problem list and histories reviewed & adjusted, as indicated.  Additional history: as documented        Current Outpatient Medications on File Prior to Visit:  acetaminophen (TYLENOL) 500 MG tablet Take 1-2 tablets (500-1,000 mg) by mouth every 8 hours as needed for mild pain   buprenorphine HCl-naloxone HCl (SUBOXONE) 8-2 MG per film One film q am and one  film q pm [id number]   buprenorphine-naloxone (SUBOXONE) 8-2 MG SUBL sublingual tablet One tab daily am and 1 tab daily q pm. [id number]   ibuprofen (ADVIL/MOTRIN) 200 MG tablet Take 3 tablets (600 mg) by mouth every 8 hours as needed for mild pain   ketoconazole (NIZORAL) 2 % cream Apply topically 2 times daily   ketoconazole (NIZORAL) 2 % shampoo Apply to the affected area and wash off after 5 minutes.   loratadine (CLARITIN) 10 MG tablet Take 10 mg by mouth daily as needed for allergies   melatonin 3 MG CAPS Take 6 mg by mouth At Bedtime   MELATONIN PO Take 3 mg by mouth nightly as needed   mupirocin (BACTROBAN) 2 % ointment Place in nose 2 x day until sx resolve   naloxone (NARCAN) 4 MG/0.1ML nasal spray Spray 1 spray (4 mg) into one nostril alternating nostrils as needed for opioid reversal   nicotine (NICODERM CQ) 7 MG/24HR 24 hr patch Place 1 patch onto the skin every 24 hours   nicotine polacrilex (NICOTINE MINI) 2 MG lozenge Place 1 lozenge (2 mg) inside cheek every hour as needed for smoking cessation   phenol-menthol (CEPASTAT) 14.5 MG lozenge Place 1 lozenge inside cheek every 2 hours as needed for moderate pain   polyethylene glycol (MIRALAX) powder Take 17 g (1 capful) by mouth daily   senna-docusate (SENOKOT-S;PERICOLACE) 8.6-50 MG per tablet Take 2 tablets by mouth daily as needed for  "constipation     Current Facility-Administered Medications on File Prior to Visit:  Self Administer Medications: Behavioral Services       No Known Allergies        REVIEW OF SYSTEMS:  General:  No acute withdrawal symptoms.  No recent infections or fever  Eyes:  No vision concerns.  No double vision.    Resp: No coughing, wheezing or shortness of breath  CV: No chest pains or palpitations  GI: No nausea, vomiting, abdominal pain, diarrhea.  No constipation  : No urinary frequency or dysuria    Musculoskeletal: No significant muscle or joint pains other than as above.  No edema  Neurologic: No numbness, tingling, weakness, problems with balance or coordination  Psychiatric: No acute concerns other than as above.   Skin: No rashes or areas of acute infection    OBJECTIVE:    PHYSICAL EXAM:  /72   Pulse 84   Resp 16   Ht 1.88 m (6' 2\")   Wt 68.9 kg (152 lb)   SpO2 99%   BMI 19.52 kg/m      GENERAL APPEARANCE:  alert, comfortable appearing  EYES:Eyes grossly normal to inspection  NEURO:  Gait normal.  No tremor. Coordination intact.   MENTAL STATUS EXAM:  Appearance/Behavior: No appearant distress  Speech: Normal  Mood/Affect: normal affect  Insight: Adequate      Results for orders placed or performed in visit on 05/15/19   Urine Drugs of Abuse Screen Panel 13   Result Value Ref Range    Cannabinoids (80-xon-4-carboxy-9-THC) Not Detected NDET^Not Detected ng/mL    Phencyclidine (Phencyclidine) Not Detected NDET^Not Detected ng/mL    Cocaine (Benzoylecgonine) Not Detected NDET^Not Detected ng/mL    Methamphetamine (d-Methamphetamine) Not Detected NDET^Not Detected ng/mL    Opiates (Morphine) Not Detected NDET^Not Detected ng/mL    Amphetamine (d-Amphetamine) Not Detected NDET^Not Detected ng/mL    Benzodiazepines (Nordiazepam) Not Detected NDET^Not Detected ng/mL    Tricyclic Antidepressants (Desipramine) Not Detected NDET^Not Detected ng/mL    Methadone (Methadone) Not Detected NDET^Not Detected ng/mL    " Barbiturates (Butalbital) Not Detected NDET^Not Detected ng/mL    Oxycodone (Oxycodone) Not Detected NDET^Not Detected ng/mL    Propoxyphene (Norpropoxyphene) Not Detected NDET^Not Detected ng/mL    Buprenorphine (Buprenorphine) Detected, Abnormal Result (A) NDET^Not Detected ng/mL           ASSESSMENT/PLAN:      F11.23) Opioid withdrawal (H)  (primary encounter diagnosis)      Plan: buprenorphine-naloxone (SUBOXONE) 8-2 MG SUBL         sublingual -will attempt to change to film at patient request to facilitate tapering.    Continue current dose 16 mg daily.  Reviewed not self adjusting dose.    Indications for tapering, pros and cons and timing of tapering reviewed at length.  Reviewed that as he struggled with cravings with the decrease in dose that it would seem to indicate that now is not a good time to consider tapering.  Risks of too rapid a taper and high risk of relapse and overdose discussed  Encouraged follow-up with any increased craving.    Encouraged regular diet and supplementation sources such as [employer] discussed.  Iron and protein sources reviewed.     No current symptoms follow up 4 wk.      Suboxone risk/benefit/side effect and intended purposes reviewed.        Opioid warning reviewed.  Risk of overdose following a period of abstinence due to decrease tolerance was discussed including risk of death. Risk of overdose if using Suboxone with other substances particuarly benzodiazepines/alcohol was reviewed.       Counseled the patient on the importance of having a recovery program in addition to Medication assisted treatment.  Components include having some type of sober network, avoiding isolating, having willingness  to change, avoiding triggers and managing cravings.    Encouraged other services such as counseling, 12 step or other self-help organizations.        Strongly recommended abstain from alcohol, benzodiazepines, THC, opioids and other drugs of abuse.  Increased risk  of relapse for opioids with use of these substances discussed.  Increased risk of overdose/death with use of other substances particularly benzodiazepines/alcohol reviewed.          (F33.9) Episode of recurrent major depressive disorder, unspecified depression episode severity (H)  Plan: will need follow up PCP. Monitor for need for  medications.   Encouraged mental health counseling      (K59.03)  Constipation -miralax 17gm daily.  Titrate amount as needed.  High fiber diet, increase fluid intake.  Follow up prn.  Reviewed other dietary management options such as parents produce increasing fluid intake etc.      (Z87.891) Nicotine use disorder. .   Plan:  Encouraged continued nicotine Abstinence.  Increase risk of relapse with other substances with nicotine use discussed.    Risk of Ecig/Vaping also reviewed.    Other nicotine cessation such as lozenge, gum, patch reviewed.   Chantix also discussed. Risks, benefits, side effects and intended purposes discussed.  Other supports such as Quit plan reviewed.             (Z61.209) High risk medication use         ENCOUNTER FOR LONG TERM USE OF HIGH RISK MEDICATION   High Risk Drug Monitoring?  YES   Drug being monitored: Buprenorphine   Reason for drug: Opioid use disorder   What is being monitored?: Dosage, Cravings, Trigger, side effects, and continued abstinence.      Opioid warning reviewed.  Risk of overdose following a period of abstinence due to decrease tolerance was discussed including risk of death.   Risk of overdose if using Suboxone with other substances particuarly benzodiazepines/alcohol was reviewed.        Sera Bardales MD  [facility]  [phone]

## 2019-07-22 ENCOUNTER — TELEPHONE (OUTPATIENT)
Dept: ADDICTION MEDICINE | Facility: CLINIC | Age: 29
End: 2019-07-22

## 2019-07-22 DIAGNOSIS — F11.20 OPIOID USE DISORDER, SEVERE, DEPENDENCE (H): ICD-10-CM

## 2019-07-22 NOTE — TELEPHONE ENCOUNTER
See my last note about patient having a hx of changing dose on his own.  Please verify what dose he has been taking recently.

## 2019-07-22 NOTE — TELEPHONE ENCOUNTER
Reason for Call:  Subx bridge    Do you use a [facility]?  Name of the pharmacy and phone number for the current request:  [facility] - [phone]    Name of the medication requested: buprenorphine HCl-naloxone HCl (SUBOXONE) 8-2 MG per film    Other request:   Last appointment: 6/12/19  Cancelled appointments: N/A  No Shows/missed appointments: 7/10/19  Scheduled appointment: 8/1/19 @ 11:20  None to take as of: 1/2 dose on 7/20/19  Date/Time/ amount of last dose: 1/2 dose on 7/20/19    Pt informed to follow up with pharmacy for status of refill as addiction RN will only reach out if there are any issues or questions and will be addressed within one business day.    This request for a bridge is simply a request but doesn't guarantee you medication.    Can we leave a detailed message on this number? YES    Phone number patient can be reached at: Home number on file [phone] (home)    Best Time: anytime    Call taken on 7/22/2019 at 3:33 PM by Ting Horton

## 2019-07-22 NOTE — TELEPHONE ENCOUNTER
Patient should have been out on July 12 th. He reports being out 7/20/19. I attempted outreach to discuss. Voicemail left requesting a call back to discuss the date he was out of Suboxone.     Refill for: Suboxone    Last Appointment: 6/10/19     Next Appointment: 8/1/19    No Shows/Cancellations since last appointment:  7/10/19    Last Refill in Epic (date and amount/how many days):    Disp Refills Start End MAGGI   buprenorphine HCl-naloxone HCl (SUBOXONE) 8-2 MG per film 60 Film 0 6/12/2019  No   Sig: One film q am and one  film q pm [id number]   Sent to pharmacy as: buprenorphine HCl-naloxone HCl (SUBOXONE) 8-2 MG per film   Class: E-Prescribe       Most Recent UDS results: 6/12/19 POS for buprenorphine     reviewed and summarized below:   Fill Date Written  Drug        Qty Days Prescriber   06/12/2019 06/12/2019 Buprenorp-Nalox 8-2 Mg Sl Film 60 30 Ei Bur   06/05/2019 06/04/2019 Buprenorp-Nalox 8-2 Mg Sl Film 16 8 Ei Bur

## 2019-07-23 RX ORDER — BUPRENORPHINE AND NALOXONE 8; 2 MG/1; MG/1
FILM, SOLUBLE BUCCAL; SUBLINGUAL
Qty: 20 FILM | Refills: 0 | Status: SHIPPED | OUTPATIENT
Start: 2019-07-23 | End: 2019-08-01

## 2019-07-23 NOTE — TELEPHONE ENCOUNTER
2nd outreach call attempted. Message left that we need to hear back from him before processing his refill request. We need his most recent dose.

## 2019-07-23 NOTE — TELEPHONE ENCOUNTER
"Uri called back. He stated it was his fault that his missed his appointment. Patient stated he knew ahead of time that he was going to miss it so he started to ration his doses. He would skip up to two day and use just one per day some days. We discussed the importance of attending appointments and the courtesy of calling to cancel so we can offer his appointment time to another patient desperate to get in. He apologized and stated he will \"never\" do it again. Patient was very polite and cooperative on the call.     He denies any substance use, \"No I'm still clean.\" Patient stated he prefers to be on two films per day and will try not to self-adjust his dose anymore. \"She tells me that every visit.\"   "

## 2019-08-01 ENCOUNTER — OFFICE VISIT (OUTPATIENT)
Dept: ADDICTION MEDICINE | Facility: CLINIC | Age: 29
End: 2019-08-01
Payer: COMMERCIAL

## 2019-08-01 VITALS
TEMPERATURE: 98.9 F | WEIGHT: 159 LBS | OXYGEN SATURATION: 99 % | SYSTOLIC BLOOD PRESSURE: 122 MMHG | RESPIRATION RATE: 16 BRPM | DIASTOLIC BLOOD PRESSURE: 70 MMHG | HEART RATE: 80 BPM | HEIGHT: 74 IN | BODY MASS INDEX: 20.41 KG/M2

## 2019-08-01 DIAGNOSIS — Z79.899 HIGH RISK MEDICATION USE: ICD-10-CM

## 2019-08-01 DIAGNOSIS — F33.9 EPISODE OF RECURRENT MAJOR DEPRESSIVE DISORDER, UNSPECIFIED DEPRESSION EPISODE SEVERITY (H): ICD-10-CM

## 2019-08-01 DIAGNOSIS — K59.03 DRUG-INDUCED CONSTIPATION: ICD-10-CM

## 2019-08-01 DIAGNOSIS — Z87.891 FORMER SMOKER: ICD-10-CM

## 2019-08-01 DIAGNOSIS — F11.20 OPIOID USE DISORDER, SEVERE, DEPENDENCE (H): Primary | ICD-10-CM

## 2019-08-01 DIAGNOSIS — L40.9 PSORIASIS: ICD-10-CM

## 2019-08-01 PROCEDURE — 99214 OFFICE O/P EST MOD 30 MIN: CPT | Performed by: PEDIATRICS

## 2019-08-01 PROCEDURE — 80306 DRUG TEST PRSMV INSTRMNT: CPT | Performed by: PEDIATRICS

## 2019-08-01 RX ORDER — TRIAMCINOLONE ACETONIDE 1 MG/G
OINTMENT TOPICAL 2 TIMES DAILY
Qty: 60 G | Refills: 0 | Status: SHIPPED | OUTPATIENT
Start: 2019-08-01

## 2019-08-01 RX ORDER — BUPRENORPHINE AND NALOXONE 8; 2 MG/1; MG/1
FILM, SOLUBLE BUCCAL; SUBLINGUAL
Qty: 60 FILM | Refills: 0 | Status: SHIPPED | OUTPATIENT
Start: 2019-08-01 | End: 2019-09-16

## 2019-08-01 ASSESSMENT — MIFFLIN-ST. JEOR: SCORE: 1755.97

## 2019-08-01 NOTE — PROGRESS NOTES
SUBJECTIVE:                                                    BUPRENORPHINE FOLLOW UP:    Uri Valladares is a 28 year old male who presents to clinic today for follow up of Buprenorphine.      Date of last visit:  7/22/2019 Bridge  6/12/19      [place] Board of Pharmacy Data Base Reviewed:    Yes ;     7/23/19 Suboxone 8mg film #20  6/12/19 #60       Brief History:     Initial visit 7/19/18  Detox 7/7/18-7/10/18  Using opioid off and on for 10 yr.   More recently tolerance increasing to the poin of Percocet/Fentanyl  up to 90-100mcg/day snorting.  No IV use.  Moderated hx of alcohol use.  Quit smoking in detox.  Hx of depression.             HPI:     8/1/2019  Suboxone has been sticking to dose of 8mg bid for the most part since last visit.  He did have 2 days without medication because he missed an appointment and ran out.  He then did call for bridge.  He has been taking 8 mg twice daily since that time.  No side effects.  No significant craving at that dose.  Took CBD pills for anxiety with possible withdrawal.  Did not really notice any change.  No other substance use.  Still smoking about ppd.  Has some interest in quitting eventually but feels he is not ready yet.  He has increased weight since last visit by improving his eating habits.  He is now almost year sober.  He does go occasionally to recovery meetings but does not go on a regular basis.  He knows they are available.  Constipation has been stable.  He denies any significant mood related symptoms.    Wt Readings from Last 4 Encounters:   08/01/19 72.1 kg (159 lb)   06/12/19 68.9 kg (152 lb)   05/15/19 70.3 kg (155 lb)   04/15/19 72.6 kg (160 lb)                  6/12/2019  Had tried to decrease Suboxone to 12 mg but then went back to 16mg.  Did not call to get increased and thus run out early.  Did call for bridge.  Feels that he is doing better on the 8 mg twice a day but continues to have a lot of ambivalence about wanting to be off medication  and or tapering.  One of his primary concerns is low weight.  He feels the Suboxone does adversely affect his appetite although he can remind himself to eat.  He worries about being too skinny.  This is been a long-standing issue.    Two yr old son doing well.  He does have some temper tantrums and age-appropriate behaviors that were discussed at length today.  He is now almost a year sober.  He occasionally is going to meetings but not much recently.  He feels he does not see himself going back to substance use and feels that he would return to a program of recovery if he needed it.  Only smoking occasionally.  Still struggles with constipation at times.  Has a vegan diet.  Is not very good about taking medications and resist taking MiraLAX.       HPI:    8/1/2019      Social History     Social History Narrative    Most recently living in car and some with mother.   Court recent for disorderly conduct and Hx of DUI few months ago (just need to take class to clear).          3 children  (8yr, 5yr and 2yr.)  Youngest lives here.  Two oldest live in [facility].         Patient Active Problem List    Diagnosis Date Noted     Chemical dependency (H) 07/10/2018     Priority: Medium     Opioid withdrawal (H) 07/07/2018     Priority: Medium     Suicidal ideation 05/15/2017     Priority: Medium       Problem list and histories reviewed & adjusted, as indicated.  Additional history: as documented        Current Outpatient Medications on File Prior to Visit:  acetaminophen (TYLENOL) 500 MG tablet Take 1-2 tablets (500-1,000 mg) by mouth every 8 hours as needed for mild pain   buprenorphine HCl-naloxone HCl (SUBOXONE) 8-2 MG per film One film q am and one  film q pm [id number]   buprenorphine-naloxone (SUBOXONE) 8-2 MG SUBL sublingual tablet One tab daily am and 1 tab daily q pm. [id number]   ibuprofen (ADVIL/MOTRIN) 200 MG tablet Take 3 tablets (600 mg) by mouth every 8 hours as needed for mild pain   ketoconazole (NIZORAL)  2 % cream Apply topically 2 times daily   ketoconazole (NIZORAL) 2 % shampoo Apply to the affected area and wash off after 5 minutes.   loratadine (CLARITIN) 10 MG tablet Take 10 mg by mouth daily as needed for allergies   melatonin 3 MG CAPS Take 6 mg by mouth At Bedtime   MELATONIN PO Take 3 mg by mouth nightly as needed   mupirocin (BACTROBAN) 2 % ointment Place in nose 2 x day until sx resolve   naloxone (NARCAN) 4 MG/0.1ML nasal spray Spray 1 spray (4 mg) into one nostril alternating nostrils as needed for opioid reversal   nicotine (NICODERM CQ) 7 MG/24HR 24 hr patch Place 1 patch onto the skin every 24 hours   nicotine polacrilex (NICOTINE MINI) 2 MG lozenge Place 1 lozenge (2 mg) inside cheek every hour as needed for smoking cessation   phenol-menthol (CEPASTAT) 14.5 MG lozenge Place 1 lozenge inside cheek every 2 hours as needed for moderate pain   polyethylene glycol (MIRALAX) powder Take 17 g (1 capful) by mouth daily   senna-docusate (SENOKOT-S;PERICOLACE) 8.6-50 MG per tablet Take 2 tablets by mouth daily as needed for constipation     Current Facility-Administered Medications on File Prior to Visit:  Self Administer Medications: Behavioral Services       No Known Allergies        REVIEW OF SYSTEMS:  General:  No acute withdrawal symptoms.  No recent infections or fever  Eyes:  No vision concerns.  No double vision.    Resp: No coughing, wheezing or shortness of breath  CV: No chest pains or palpitations  GI: No nausea, vomiting, abdominal pain, diarrhea.  No constipation  : No urinary frequency or dysuria    Musculoskeletal: No significant muscle or joint pains other than as above.  No edema  Neurologic: No numbness, tingling, weakness, problems with balance or coordination  Psychiatric: No acute concerns other than as above.   Skin: No rashes or areas of acute infection    OBJECTIVE:    PHYSICAL EXAM:  There were no vitals taken for this visit.    GENERAL APPEARANCE:  alert, comfortable  appearing  EYES:Eyes grossly normal to inspection  NEURO:  Gait normal.  No tremor. Coordination intact.   MENTAL STATUS EXAM:  Appearance/Behavior: No appearant distress  Speech: Normal  Mood/Affect: normal affect  Insight: Adequate      Results for orders placed or performed in visit on 06/12/19   Urine Drugs of Abuse Screen Panel 13   Result Value Ref Range    Cannabinoids (57-osm-2-carboxy-9-THC) Not Detected NDET^Not Detected ng/mL    Phencyclidine (Phencyclidine) Not Detected NDET^Not Detected ng/mL    Cocaine (Benzoylecgonine) Not Detected NDET^Not Detected ng/mL    Methamphetamine (d-Methamphetamine) Not Detected NDET^Not Detected ng/mL    Opiates (Morphine) Not Detected NDET^Not Detected ng/mL    Amphetamine (d-Amphetamine) Not Detected NDET^Not Detected ng/mL    Benzodiazepines (Nordiazepam) Not Detected NDET^Not Detected ng/mL    Tricyclic Antidepressants (Desipramine) Not Detected NDET^Not Detected ng/mL    Methadone (Methadone) Not Detected NDET^Not Detected ng/mL    Barbiturates (Butalbital) Not Detected NDET^Not Detected ng/mL    Oxycodone (Oxycodone) Not Detected NDET^Not Detected ng/mL    Propoxyphene (Norpropoxyphene) Not Detected NDET^Not Detected ng/mL    Buprenorphine (Buprenorphine) Detected, Abnormal Result (A) NDET^Not Detected ng/mL           ASSESSMENT/PLAN:      F11.23) Opioid withdrawal (H)  (primary encounter diagnosis)      Plan: buprenorphine-naloxone (SUBOXONE) 8-2 MG SUBL         sublingual -will attempt to change to film at patient request to facilitate tapering.    Continue current dose 16 mg daily.  Reviewed not self adjusting dose.    Indications for tapering in the future discussed. Risks of too rapid a taper and high risk of relapse and overdose discussed  Encouraged follow-up with any increased craving.      No current symptoms follow up 4 wk.      Suboxone risk/benefit/side effect and intended purposes reviewed.        Opioid warning reviewed.  Risk of overdose following a  period of abstinence due to decrease tolerance was discussed including risk of death. Risk of overdose if using Suboxone with other substances particuarly benzodiazepines/alcohol was reviewed.       Counseled the patient on the importance of having a recovery program in addition to Medication assisted treatment.  Components include having some type of sober network, avoiding isolating, having willingness  to change, avoiding triggers and managing cravings.    Encouraged other services such as counseling, 12 step or other self-help organizations.        Strongly recommended abstain from alcohol, benzodiazepines, THC, opioids and other drugs of abuse.  Increased risk of relapse for opioids with use of these substances discussed.  Increased risk of overdose/death with use of other substances particularly benzodiazepines/alcohol reviewed.          (F33.9) Episode of recurrent major depressive disorder, unspecified depression episode severity (H)  Plan: will need follow up PCP. Monitor for need for  medications.   Encouraged mental health counseling      (K59.03)  Constipation -miralax 17gm daily.  Titrate amount as needed.  High fiber diet, increase fluid intake.  Follow up prn.  Reviewed other dietary management options such as parents produce increasing fluid intake etc.      (D31.101) Nicotine use disorder. .   Plan:  Encouraged continued nicotine Abstinence.  Increase risk of relapse with other substances with nicotine use discussed.    Risk of Ecig/Vaping also reviewed.    Other nicotine cessation such as lozenge, gum, patch reviewed.   Chantix also discussed.  Patient is considering.  Risks, benefits, side effects and intended purposes discussed.  Other supports such as Quit plan reviewed.          (Q68.093) High risk medication use     ENCOUNTER FOR LONG TERM USE OF HIGH RISK MEDICATION   High Risk Drug Monitoring?  YES   Drug being monitored: Buprenorphine   Reason for drug: Opioid use disorder   What is being  monitored?: Dosage, Cravings, Trigger, side effects, and continued abstinence.      Opioid warning reviewed.  Risk of overdose following a period of abstinence due to decrease tolerance was discussed including risk of death.   Risk of overdose if using Suboxone with other substances particuarly benzodiazepines/alcohol was reviewed.        Sera Bardales MD  [facility]  [phone]

## 2019-08-01 NOTE — PATIENT INSTRUCTIONS
Patient Education     Varenicline oral tablets  Brand Name: Chantix  What is this medicine?  VARENICLINE (gian EN i kleen) is used to help people quit smoking. It is used with a patient support program recommended by your physician.  How should I use this medicine?  Take this medicine by mouth after eating. Take with a full glass of water. Follow the directions on the prescription label. Take your doses at regular intervals. Do not take your medicine more often than directed.  There are 3 ways you can use this medicine to help you quit smoking; talk to your health care professional to decide which plan is right for you:  1) you can choose a quit date and start this medicine 1 week before the quit date, or,  2) you can start taking this medicine before you choose a quit date, and then pick a quit date between day 8 and 35 days of treatment, or,  3) if you are not sure that you are able or willing to quit smoking right away, start taking this medicine and slowly decrease the amount you smoke as directed by your health care professional with the goal of being cigarette-free by week 12 of treatment.  Stick to your plan; ask about support groups or other ways to help you remain cigarette-free. If you are motivated to quit smoking and did not succeed during a previous attempt with this medicine for reasons other than side effects, or if you returned to smoking after this treatment, speak with your health care professional about whether another course of this medicine may be right for you.  A special MedGuide will be given to you by the pharmacist with each prescription and refill. Be sure to read this information carefully each time.  Talk to your pediatrician regarding the use of this medicine in children. This medicine is not approved for use in children.  What side effects may I notice from receiving this medicine?  Side effects that you should report to your doctor or health care professional as soon as  possible:    allergic reactions like skin rash, itching or hives, swelling of the face, lips, tongue, or throat    acting aggressive, being angry or violent, or acting on dangerous impulses    breathing problems    changes in emotions or moods    chest pain or chest tightness    feeling faint or lightheaded, falls    hallucination, loss of contact with reality    mouth sores    redness, blistering, peeling or loosening of the skin, including inside the mouth    signs and symptoms of a stroke like changes in vision; confusion; trouble speaking or understanding; severe headaches; sudden numbness or weakness of the face, arm or leg; trouble walking; dizziness; loss of balance or coordination    seizures    sleepwalking    suicidal thoughts or other mood changes  Side effects that usually do not require medical attention (report to your doctor or health care professional if they continue or are bothersome):    constipation    gas    headache    nausea, vomiting    strange dreams    trouble sleeping  What may interact with this medicine?      alcohol    insulin    other medicines used to help people quit smoking    theophylline    warfarin  What if I miss a dose?  If you miss a dose, take it as soon as you can. If it is almost time for your next dose, take only that dose. Do not take double or extra doses.  Where should I keep my medicine?  Keep out of the reach of children.  Store at room temperature between 15 and 30 degrees C (59 and 86 degrees F). Throw away any unused medicine after the expiration date.  What should I tell my health care provider before I take this medicine?  They need to know if you have any of these conditions:    heart disease    if you often drink alcohol    kidney disease    mental illness    on hemodialysis    seizures    history of stroke    suicidal thoughts, plans, or attempt; a previous suicide attempt by you or a family member    an unusual or allergic reaction to varenicline, other  medicines, foods, dyes, or preservatives    pregnant or trying to get pregnant    breast-feeding  What should I watch for while using this medicine?  It is okay if you do not succeed at your attempt to quit and have a cigarette. You can still continue your quit attempt and keep using this medicine as directed. Just throw away your cigarettes and get back to your quit plan.  Talk to your health care provider before using other treatments to quit smoking. Using this medicine with other treatments to quit smoking may increase the risk for side effects compared to using a treatment alone.  You may get drowsy or dizzy. Do not drive, use machinery, or do anything that needs mental alertness until you know how this medicine affects you. Do not stand or sit up quickly, especially if you are an older patient. This reduces the risk of dizzy or fainting spells.  Decrease the amount of alcoholic beverages that you drink during treatment with this medicine until you know if this medicine affects your ability to tolerate alcohol. Some people have experienced increased drunkenness (intoxication), unusual or sometimes aggressive behavior, or no memory of things that have happened (amnesia) during treatment with this medicine.  Sleepwalking can happen during treatment with this medicine, and can sometimes lead to behavior that is harmful to you, other people, or property. Stop taking this medicine and tell your doctor if you start sleepwalking or have other unusual sleep-related activity.  Patients and their families should watch out for new or worsening depression or thoughts of suicide. Also watch out for sudden changes in feelings such as feeling anxious, agitated, panicky, irritable, hostile, aggressive, impulsive, severely restless, overly excited and hyperactive, or not being able to sleep. If this happens, call your health care professional.  If you have diabetes and you quit smoking, the effects of insulin may be increased  and you may need to reduce your insulin dose. Check with your doctor or health care professional about how you should adjust your insulin dose.  NOTE:This sheet is a summary. It may not cover all possible information. If you have questions about this medicine, talk to your doctor, pharmacist, or health care provider. Copyright  2018 [employer]

## 2019-09-16 ENCOUNTER — TELEPHONE (OUTPATIENT)
Dept: ADDICTION MEDICINE | Facility: CLINIC | Age: 29
End: 2019-09-16

## 2019-09-16 DIAGNOSIS — F11.20 OPIOID USE DISORDER, SEVERE, DEPENDENCE (H): ICD-10-CM

## 2019-09-16 RX ORDER — BUPRENORPHINE AND NALOXONE 8; 2 MG/1; MG/1
FILM, SOLUBLE BUCCAL; SUBLINGUAL
Qty: 6 FILM | Refills: 0 | Status: SHIPPED | OUTPATIENT
Start: 2019-09-16 | End: 2019-09-18

## 2019-09-16 NOTE — TELEPHONE ENCOUNTER
Patient reports taking 1-2 films per day (prescribed as BID).    Patient also forgot meds when out of town so Rx lasted even longer.    Medication pended for 3 day supply.  Routing to provider.      Refill for: buprenorphine HCl-naloxone HCl (SUBOXONE) 8-2mg    Last Appointment: 8/1/19    Next Appointment: 9/18/19    No Shows/Cancellations since last appointment: no show: 8/29    Last Refill in Epic (date and amount/how many days):    Disp Refills Start End [facility]   buprenorphine HCl-naloxone HCl (SUBOXONE) 8-2 MG per film 60 Film 0 8/1/2019  No   Sig: One film q am and one  film q pm     Most Recent UDS results: POS: cannabinoids and buprenorphine on 8/1     reviewed and summarized below:   Fill Date Drug      Qty  Days  Prescriber  08/01/201 Suboxone 8 Mg-2 Mg Sl Film  60 30  Ei Meenakshi Blanco RN  09/16/19  10:02 AM

## 2019-09-16 NOTE — TELEPHONE ENCOUNTER
Reason for Call:  Medication Request due to: missed appointment Went out of town and forgot about appt . Also, when out of town forgot medication therefore had enough to get to this weekend.    Name of the pharmacy and phone number for the current request:  [facility] tel: [phone]    Request for bridge of: buprenorphine HCl-naloxone HCl (SUBOXONE) 8-2 MG per film    Other Information:   Taking as prescribed: Yes Some days I only need 1 and some days I need 2  Date/Time/ amount of last dose:  1 dose on 9/13/19    Pt informed to follow up with pharmacy for status of refill as addiction RN will only reach out if there are any issues or questions and will be addressed within one business day.  Pt also informed that this request for a bridge is simply a request and doesn't guarantee the medication will be filled.    Can we leave a detailed message on this number? Yes    Phone number patient can be reached at: [phone]    Best Time: anytime

## 2019-09-18 ENCOUNTER — OFFICE VISIT (OUTPATIENT)
Dept: ADDICTION MEDICINE | Facility: CLINIC | Age: 29
End: 2019-09-18
Payer: COMMERCIAL

## 2019-09-18 VITALS
TEMPERATURE: 97.7 F | HEART RATE: 74 BPM | BODY MASS INDEX: 19.84 KG/M2 | WEIGHT: 154.5 LBS | SYSTOLIC BLOOD PRESSURE: 114 MMHG | OXYGEN SATURATION: 98 % | DIASTOLIC BLOOD PRESSURE: 74 MMHG | RESPIRATION RATE: 16 BRPM

## 2019-09-18 DIAGNOSIS — K59.03 DRUG-INDUCED CONSTIPATION: ICD-10-CM

## 2019-09-18 DIAGNOSIS — B36.0 TINEA VERSICOLOR DUE TO MALASSEZIA FURFUR: ICD-10-CM

## 2019-09-18 DIAGNOSIS — Z79.899 HIGH RISK MEDICATION USE: ICD-10-CM

## 2019-09-18 DIAGNOSIS — Z87.891 FORMER SMOKER: ICD-10-CM

## 2019-09-18 DIAGNOSIS — F33.9 EPISODE OF RECURRENT MAJOR DEPRESSIVE DISORDER, UNSPECIFIED DEPRESSION EPISODE SEVERITY (H): ICD-10-CM

## 2019-09-18 DIAGNOSIS — F11.20 OPIOID USE DISORDER, SEVERE, DEPENDENCE (H): Primary | ICD-10-CM

## 2019-09-18 PROCEDURE — 99214 OFFICE O/P EST MOD 30 MIN: CPT | Performed by: PEDIATRICS

## 2019-09-18 PROCEDURE — 80306 DRUG TEST PRSMV INSTRMNT: CPT | Performed by: PEDIATRICS

## 2019-09-18 RX ORDER — KETOCONAZOLE 20 MG/ML
SHAMPOO TOPICAL
Qty: 120 ML | Refills: 11 | Status: SHIPPED | OUTPATIENT
Start: 2019-09-18 | End: 2019-09-18

## 2019-09-18 RX ORDER — KETOCONAZOLE 20 MG/ML
SHAMPOO TOPICAL
Qty: 120 ML | Refills: 11 | Status: SHIPPED | OUTPATIENT
Start: 2019-09-18

## 2019-09-18 RX ORDER — BUPRENORPHINE AND NALOXONE 8; 2 MG/1; MG/1
FILM, SOLUBLE BUCCAL; SUBLINGUAL
Qty: 60 FILM | Refills: 0 | Status: SHIPPED | OUTPATIENT
Start: 2019-09-18 | End: 2019-11-01

## 2019-09-18 NOTE — PROGRESS NOTES
SUBJECTIVE:                                                    BUPRENORPHINE FOLLOW UP:    Uri Valladares is a 29 year old male who presents to clinic today for follow up of Buprenorphine.      Date of last visit:  9/16/2019    [place] Board of Pharmacy Data Base Reviewed:    Yes ;      8/1/2019 Suboxone 8 mg film #60   bridge prescriptions applied 9/16/2019           Brief History:   Initial visit 7/19/18  Detox 7/7/18-7/10/18  Using opioid off and on for 10 yr.   More recently tolerance increasing to the poin of Percocet/Fentanyl  up to 90-100mcg/day snorting.  No IV use.  Moderated h  x of alcohol use.  Quit smoking in detox.  Hx of depression.             HPI:   9/18/2019 patient is interested in tapering from Suboxone.  This is been an ongoing discussion.  Feels libido is down and feels this is a side effect.  Has been some overall depression and feels a Suboxone participates with this two.  He did use marijuana once but otherwise has not had any substance use.  He feels the Suboxone adversely affects his appetite.  He also has had some intermittent constipation.  Feels he would do better without.  He is to attend meetings but has not been doing so recently.  For past 1-2 week will take at least 8mg /day and about every other day 16mg /day.     Struggling with problem with relationship.  Significant other is struggling with significant depression.  He would be interested in couples counseling and is also hopeful to get her engaged in some type of counseling.        Social History     Social History Narrative    Most recently living in car and some with mother.   Court recent for disorderly conduct and Hx of DUI few months ago (just need to take class to clear).          3 children  (8yr, 5yr and 2yr.)  Youngest lives here.  Two oldest live in [facility].         Patient Active Problem List    Diagnosis Date Noted     Chemical dependency (H) 07/10/2018     Priority: Medium     Opioid withdrawal (H)  07/07/2018     Priority: Medium     Suicidal ideation 05/15/2017     Priority: Medium       Problem list and histories reviewed & adjusted, as indicated.  Additional history: as documented        Current Outpatient Medications on File Prior to Visit:  acetaminophen (TYLENOL) 500 MG tablet Take 1-2 tablets (500-1,000 mg) by mouth every 8 hours as needed for mild pain   buprenorphine HCl-naloxone HCl (SUBOXONE) 8-2 MG per film One film q am and one  film q pm   buprenorphine-naloxone (SUBOXONE) 8-2 MG SUBL sublingual tablet One tab daily am and 1 tab daily q pm. [id number]   ibuprofen (ADVIL/MOTRIN) 200 MG tablet Take 3 tablets (600 mg) by mouth every 8 hours as needed for mild pain   ketoconazole (NIZORAL) 2 % cream Apply topically 2 times daily   ketoconazole (NIZORAL) 2 % shampoo Apply to the affected area and wash off after 5 minutes.   loratadine (CLARITIN) 10 MG tablet Take 10 mg by mouth daily as needed for allergies   melatonin 3 MG CAPS Take 6 mg by mouth At Bedtime   MELATONIN PO Take 3 mg by mouth nightly as needed   mupirocin (BACTROBAN) 2 % ointment Place in nose 2 x day until sx resolve   naloxone (NARCAN) 4 MG/0.1ML nasal spray Spray 1 spray (4 mg) into one nostril alternating nostrils as needed for opioid reversal   nicotine (NICODERM CQ) 7 MG/24HR 24 hr patch Place 1 patch onto the skin every 24 hours   nicotine polacrilex (NICOTINE MINI) 2 MG lozenge Place 1 lozenge (2 mg) inside cheek every hour as needed for smoking cessation   phenol-menthol (CEPASTAT) 14.5 MG lozenge Place 1 lozenge inside cheek every 2 hours as needed for moderate pain   polyethylene glycol (MIRALAX) powder Take 17 g (1 capful) by mouth daily   senna-docusate (SENOKOT-S;PERICOLACE) 8.6-50 MG per tablet Take 2 tablets by mouth daily as needed for constipation   triamcinolone (KENALOG) 0.1 % external ointment Apply topically 2 times daily     Current Facility-Administered Medications on File Prior to Visit:  Self Administer  Medications: Behavioral Services       No Known Allergies        REVIEW OF SYSTEMS:  General:  No acute withdrawal symptoms.  No recent infections or fever  Eyes:  No vision concerns.  No double vision.    Resp: No coughing, wheezing or shortness of breath  CV: No chest pains or palpitations  GI: No nausea, vomiting, abdominal pain, diarrhea.  No constipation  : No urinary frequency or dysuria    Musculoskeletal: No significant muscle or joint pains other than as above.  No edema  Neurologic: No numbness, tingling, weakness, problems with balance or coordination  Psychiatric: No acute concerns other than as above.   Skin: No rashes or areas of acute infection    OBJECTIVE:    PHYSICAL EXAM:  /74   Pulse 74   Temp 97.7  F (36.5  C)   Resp 16   Wt 70.1 kg (154 lb 8 oz)   SpO2 98%   BMI 19.84 kg/m      GENERAL APPEARANCE:  alert, comfortable appearing  EYES:Eyes grossly normal to inspection  NEURO:  Gait normal.  No tremor. Coordination intact.   MENTAL STATUS EXAM:  Appearance/Behavior: No appearant distress  Speech: Normal  Mood/Affect: normal affect  Insight: Adequate      Results for orders placed or performed in visit on 08/01/19   Urine Drugs of Abuse Screen Panel 13   Result Value Ref Range    Cannabinoids (63-eho-3-carboxy-9-THC) Detected, Abnormal Result (A) NDET^Not Detected ng/mL    Phencyclidine (Phencyclidine) Not Detected NDET^Not Detected ng/mL    Cocaine (Benzoylecgonine) Not Detected NDET^Not Detected ng/mL    Methamphetamine (d-Methamphetamine) Not Detected NDET^Not Detected ng/mL    Opiates (Morphine) Not Detected NDET^Not Detected ng/mL    Amphetamine (d-Amphetamine) Not Detected NDET^Not Detected ng/mL    Benzodiazepines (Nordiazepam) Not Detected NDET^Not Detected ng/mL    Tricyclic Antidepressants (Desipramine) Not Detected NDET^Not Detected ng/mL    Methadone (Methadone) Not Detected NDET^Not Detected ng/mL    Barbiturates (Butalbital) Not Detected NDET^Not Detected ng/mL     Oxycodone (Oxycodone) Not Detected NDET^Not Detected ng/mL    Propoxyphene (Norpropoxyphene) Not Detected NDET^Not Detected ng/mL    Buprenorphine (Buprenorphine) Detected, Abnormal Result (A) NDET^Not Detected ng/mL           ASSESSMENT/PLAN:        F11.23) Opioid withdrawal (H)  (primary encounter diagnosis)      Plan: buprenorphine-naloxone (SUBOXONE) 8-2 MG SUBL         sublingual  # 60     Will plan for 12 mg /day currently and plan to decrease if desired to 8mg about a week prior to the next visit.       No current symptoms follow up 5 wk due to provider availability.      Suboxone risk/benefit/side effect and intended purposes reviewed.      Indications for tapering reviewed.  Recommended to slow guided taper with frequent rechecks to assess progress and assess for possible increasing cravings      Encouraged attendance at meetings and other recovery supports especially during the tapering process      Strongly recommended abstain from alcohol, benzodiazepines, THC, opioids and other drugs of abuse.  Increased risk of relapse for opioids with use of these substances discussed.  Increased risk of overdose/death with use of other substances particularly benzodiazepines/alcohol reviewed.          (F33.9) Episode of recurrent major depressive disorder, unspecified depression episode severity (H)  Plan: will need follow up PCP. Monitor for need for  medications.   Encouraged mental health counseling.  [facility] referral for counseling discussed.      (K59.03)  Constipation -miralax 17gm daily.  Titrate amount as needed.  High fiber diet, increase fluid intake.  Follow up prn.  Reviewed other dietary management options such as parents produce increasing fluid intake etc.      (Z87.891) Nicotine use disorder. .   Plan:  Encouraged continued nicotine Abstinence.  Increase risk of relapse with other substances with nicotine use discussed.    Risk of Ecig/Vaping also reviewed.    Other nicotine cessation such as lozenge,  gum, patch reviewed.   Chantix also discussed.  Patient is considering.  Risks, benefits, side effects and intended purposes discussed.  Other supports such as Quit plan reviewed.          (Z82.158) High risk medication use       ENCOUNTER FOR LONG TERM USE OF HIGH RISK MEDICATION   High Risk Drug Monitoring?  YES   Drug being monitored: Buprenorphine   Reason for drug: Opioid use disorder   What is being monitored?: Dosage, Cravings, Trigger, side effects, and continued abstinence.      Opioid warning reviewed.  Risk of overdose following a period of abstinence due to decrease tolerance was discussed including risk of death.   Risk of overdose if using Suboxone with other substances particuarly benzodiazepines/alcohol was reviewed.        Sera Bardales MD  [facility]  [phone]

## 2019-11-01 ENCOUNTER — TELEPHONE (OUTPATIENT)
Dept: ADDICTION MEDICINE | Facility: CLINIC | Age: 29
End: 2019-11-01

## 2019-11-01 DIAGNOSIS — F11.20 OPIOID USE DISORDER, SEVERE, DEPENDENCE (H): ICD-10-CM

## 2019-11-01 NOTE — TELEPHONE ENCOUNTER
Reason for Call:  Medication Request due to: missed appointment - Patient is schedule to see Dr. Bardales on 11/08/19.    Name of the pharmacy and phone number for the current request:  [id number] - [facility] [address] AT [facility] [address]    Request for bridge of: buprenorphine HCl-naloxone HCl (SUBOXONE) 8-2 MG per film    Other Information:   Taking as prescribed: Yes  Date/Time/ amount of last dose: Tomorrow 11/02    Pt informed to follow up with pharmacy for status of refill as addiction RN will only reach out if there are any issues or questions and will be addressed within one business day.  Pt also informed that this request for a bridge is simply a request and doesn't guarantee the medication will be filled.    Can we leave a detailed message on this number? Yes    Phone number patient can be reached at:[phone]     Best Time: Anytime

## 2019-11-04 RX ORDER — BUPRENORPHINE AND NALOXONE 8; 2 MG/1; MG/1
FILM, SOLUBLE BUCCAL; SUBLINGUAL
Qty: 10 FILM | Refills: 0 | Status: SHIPPED | OUTPATIENT
Start: 2019-11-04 | End: 2019-11-08

## 2019-11-04 NOTE — TELEPHONE ENCOUNTER
"  Per Dr Bardales's tx plan dated 9/18/19 patient is interested in Suboxone taper. Per Dr Bardales, \"Will plan for 12 mg /day currently and plan to decrease if desired to 8mg about a week prior to the next visit.\" Patient refill request dated 11/1/19.    Medication pended for 5 day supply.  Routing to provider.      Refill for: buprenorphine HCl-naloxone HCl (SUBOXONE) 8-2mg    Last Appointment: 9/18/19    Next Appointment: 11/8/19    No Shows/Cancellations since last appointment: no show: 10/30/19    Last Refill in Epic (date and amount/how many days):     Outpatient Medication Detail      Disp Refills Start End [facility]GI   buprenorphine HCl-naloxone HCl (SUBOXONE) 8-2 MG per film 60 Film 0 9/18/2019  No   Sig: One film q am and one  film q pm   Sent to pharmacy as: buprenorphine HCl-naloxone HCl (SUBOXONE) 8-2 MG per film   Class: E-Prescribe   Notes to Pharmacy: ALENA: [id number]   Order: [id number]   E-Prescribing Status: Receipt confirmed by pharmacy (9/18/2019  1:35 PM CDT)         Most Recent UDS results: POS: cannabinoids and buprenorphine on 9/18/19     reviewed and summarized below:     This nurse is not an approved  delegate for Dr Bardales. See note from Angelita Alfaro RN.       Lamar Mercado RN  11/04/19  8:37 AM      "

## 2019-11-08 ENCOUNTER — OFFICE VISIT (OUTPATIENT)
Dept: ADDICTION MEDICINE | Facility: CLINIC | Age: 29
End: 2019-11-08

## 2019-11-08 VITALS
HEART RATE: 90 BPM | BODY MASS INDEX: 20.29 KG/M2 | TEMPERATURE: 98.2 F | OXYGEN SATURATION: 98 % | WEIGHT: 158 LBS | SYSTOLIC BLOOD PRESSURE: 108 MMHG | DIASTOLIC BLOOD PRESSURE: 70 MMHG

## 2019-11-08 DIAGNOSIS — F11.20 OPIOID USE DISORDER, SEVERE, DEPENDENCE (H): Primary | ICD-10-CM

## 2019-11-08 DIAGNOSIS — Z79.899 HIGH RISK MEDICATION USE: ICD-10-CM

## 2019-11-08 DIAGNOSIS — K59.03 DRUG-INDUCED CONSTIPATION: ICD-10-CM

## 2019-11-08 DIAGNOSIS — F33.9 EPISODE OF RECURRENT MAJOR DEPRESSIVE DISORDER, UNSPECIFIED DEPRESSION EPISODE SEVERITY (H): ICD-10-CM

## 2019-11-08 DIAGNOSIS — Z87.891 FORMER SMOKER: ICD-10-CM

## 2019-11-08 PROCEDURE — 80306 DRUG TEST PRSMV INSTRMNT: CPT | Performed by: PEDIATRICS

## 2019-11-08 PROCEDURE — 99214 OFFICE O/P EST MOD 30 MIN: CPT | Performed by: PEDIATRICS

## 2019-11-08 RX ORDER — BUPRENORPHINE AND NALOXONE 8; 2 MG/1; MG/1
FILM, SOLUBLE BUCCAL; SUBLINGUAL
Qty: 30 FILM | Refills: 0 | Status: SHIPPED | OUTPATIENT
Start: 2019-11-08

## 2019-11-08 NOTE — PROGRESS NOTES
SUBJECTIVE:                                                    BUPRENORPHINE FOLLOW UP:    Uri Valladares is a 29 year old male who presents to clinic today for follow up of Buprenorphine.      Date of last visit:  11/1/2019    [place] Board of Pharmacy Data Base Reviewed:    Yes ;     9/18/19 Suboxone 8mg film #60        Brief History:    Initial visit 7/19/18  Detox 7/7/18-7/10/18  Using opioid off and on for 10 yr.   More recently tolerance increasing to the poin of Percocet/Fentanyl  up to 90-100mcg/day snorting.  No IV use.  Moderated h  x of alcohol use.  Quit smoking in detox.  Hx of depression.             HPI:   11/8/2019  Suboxone started taper at last visit 16 mg to 8mg /day.  Is doing well at that dose and would like to continue to taper.  Would like eventually to be off medication but does have some awareness that too rapid a taper could be risky.  His appetite and libido have been somewhat better.  Constipation is stable.  He is not currently attending any meetings although has done so in the past.  Girlfriend is becoming more aware of her issues.  She stuggles with explosive behavior.  She has hx of trauma.  He feels like he has better understanding her and more able to deal with her behaviors.  She is considering counseling.  He continues to encourage this.  Continues intermittent cannabis use.  No increase in use.  No mental health concerns today.    Social History     Patient does not qualify to have social determinant information on file (likely too young).   Social History Narrative    Most recently living in car and some with mother.   Court recent for disorderly conduct and Hx of DUI few months ago (just need to take class to clear).          3 children  (8yr, 5yr and 2yr.)  Youngest lives here.  Two oldest live in [facility].         Patient Active Problem List    Diagnosis Date Noted     Chemical dependency (H) 07/10/2018     Priority: Medium     Opioid withdrawal (H) 07/07/2018      Priority: Medium     Suicidal ideation 05/15/2017     Priority: Medium       Problem list and histories reviewed & adjusted, as indicated.  Additional history: as documented      acetaminophen (TYLENOL) 500 MG tablet, Take 1-2 tablets (500-1,000 mg) by mouth every 8 hours as needed for mild pain  buprenorphine HCl-naloxone HCl (SUBOXONE) 8-2 MG per film, One film every am and one film every pm  ibuprofen (ADVIL/MOTRIN) 200 MG tablet, Take 3 tablets (600 mg) by mouth every 8 hours as needed for mild pain  ketoconazole (NIZORAL) 2 % cream, Apply topically 2 times daily  ketoconazole (NIZORAL) 2 % external shampoo, Apply to the affected area and wash off after 5 minutes.  loratadine (CLARITIN) 10 MG tablet, Take 10 mg by mouth daily as needed for allergies  melatonin 3 MG CAPS, Take 6 mg by mouth At Bedtime  MELATONIN PO, Take 3 mg by mouth nightly as needed  mupirocin (BACTROBAN) 2 % ointment, Place in nose 2 x day until sx resolve  naloxone (NARCAN) 4 MG/0.1ML nasal spray, Spray 1 spray (4 mg) into one nostril alternating nostrils as needed for opioid reversal  nicotine (NICODERM CQ) 7 MG/24HR 24 hr patch, Place 1 patch onto the skin every 24 hours  nicotine polacrilex (NICOTINE MINI) 2 MG lozenge, Place 1 lozenge (2 mg) inside cheek every hour as needed for smoking cessation  phenol-menthol (CEPASTAT) 14.5 MG lozenge, Place 1 lozenge inside cheek every 2 hours as needed for moderate pain  polyethylene glycol (MIRALAX) powder, Take 17 g (1 capful) by mouth daily  senna-docusate (SENOKOT-S;PERICOLACE) 8.6-50 MG per tablet, Take 2 tablets by mouth daily as needed for constipation  triamcinolone (KENALOG) 0.1 % external ointment, Apply topically 2 times daily    Self Administer Medications: Behavioral Services        No Known Allergies        REVIEW OF SYSTEMS:  General:  No acute withdrawal symptoms.  No recent infections or fever  Eyes:  No vision concerns.  No double vision.    Resp: No coughing, wheezing or  shortness of breath  CV: No chest pains or palpitations  GI: No nausea, vomiting, abdominal pain, diarrhea.  No constipation  : No urinary frequency or dysuria    Musculoskeletal: No significant muscle or joint pains other than as above.  No edema  Neurologic: No numbness, tingling, weakness, problems with balance or coordination  Psychiatric: No acute concerns other than as above.   Skin: No rashes or areas of acute infection    OBJECTIVE:    PHYSICAL EXAM:  /70   Pulse 90   Temp 98.2  F (36.8  C) (Temporal)   Wt 71.7 kg (158 lb)   SpO2 98%   BMI 20.29 kg/m      GENERAL APPEARANCE:  alert, comfortable appearing  EYES:Eyes grossly normal to inspection  NEURO:  Gait normal.  No tremor. Coordination intact.   MENTAL STATUS EXAM:  Appearance/Behavior: No appearant distress  Speech: Normal  Mood/Affect: normal affect  Insight: Adequate      Results for orders placed or performed in visit on 11/08/19   Urine Drugs of Abuse Screen Panel 13     Status: Abnormal   Result Value Ref Range    Cannabinoids (50-evb-8-carboxy-9-THC) Detected, Abnormal Result (A) NDET^Not Detected ng/mL    Phencyclidine (Phencyclidine) Not Detected NDET^Not Detected ng/mL    Cocaine (Benzoylecgonine) Not Detected NDET^Not Detected ng/mL    Methamphetamine (d-Methamphetamine) Not Detected NDET^Not Detected ng/mL    Opiates (Morphine) Not Detected NDET^Not Detected ng/mL    Amphetamine (d-Amphetamine) Not Detected NDET^Not Detected ng/mL    Benzodiazepines (Nordiazepam) Not Detected NDET^Not Detected ng/mL    Tricyclic Antidepressants (Desipramine) Not Detected NDET^Not Detected ng/mL    Methadone (Methadone) Not Detected NDET^Not Detected ng/mL    Barbiturates (Butalbital) Not Detected NDET^Not Detected ng/mL    Oxycodone (Oxycodone) Not Detected NDET^Not Detected ng/mL    Propoxyphene (Norpropoxyphene) Not Detected NDET^Not Detected ng/mL    Buprenorphine (Buprenorphine) Detected, Abnormal Result (A) NDET^Not Detected ng/mL            ASSESSMENT/PLAN:    F11.23) Opioid withdrawal (H)  (primary encounter diagnosis)      Plan: buprenorphine-naloxone (SUBOXONE) 8-2 MG SUBL         sublingual  # 60   Patient was again cautioned about tapering too quickly and the risk of relapse and potential overdose.  May consider decreased to 6 mg prior to next visit if interested.  Would not proceed past this until follow-up.  Next decreased would be to 4 mg.     No current symptoms follow up 8 wk sooner with any difficulties with taper.     Suboxone risk/benefit/side effect and intended purposes reviewed.        Encouraged attendance at meetings and other recovery supports especially during the tapering process      Strongly recommended abstain from alcohol, benzodiazepines, THC, opioids and other drugs of abuse.  Increased risk of relapse for opioids with use of these substances discussed.  Increased risk of overdose/death with use of other substances particularly benzodiazepines/alcohol reviewed.          (F33.9) Episode of recurrent major depressive disorder, unspecified depression episode severity (H)  Plan: will need follow up PCP. Monitor for need for  medications.   Encouraged mental health counseling.  [facility] referral for counseling discussed.      (K59.03)  Constipation -miralax 17gm daily.  Titrate amount as needed.  High fiber diet, increase fluid intake.  Follow up prn.  Reviewed other dietary management options such as parents produce increasing fluid intake etc.      (Z87.891) Nicotine use disorder. .   Plan:  Encouraged continued nicotine Abstinence.  Increase risk of relapse with other substances with nicotine use discussed.    Risk of Ecig/Vaping also reviewed.    Other nicotine cessation such as lozenge, gum, patch reviewed.   Chantix also discussed.  Patient is considering.  Risks, benefits, side effects and intended purposes discussed.  Other supports such as Quit plan reviewed.          (Z20.349) High risk medication use    ENCOUNTER  FOR LONG TERM USE OF HIGH RISK MEDICATION   High Risk Drug Monitoring?  YES   Drug being monitored: Buprenorphine   Reason for drug: Opioid use disorder   What is being monitored?: Dosage, Cravings, Trigger, side effects, and continued abstinence.      Opioid warning reviewed.  Risk of overdose following a period of abstinence due to decrease tolerance was discussed including risk of death.   Risk of overdose if using Suboxone with other substances particuarly benzodiazepines/alcohol was reviewed.        Sera Bardales MD  [facility]  [phone]

## 2020-07-13 NOTE — PROGRESS NOTES
Clinic Care Coordination Contact 4/4/2019  Care Team Conversations    SW spoke with pt to confirm intake appointment for co-coordinated care on 4/15/2019 at 2:45pm. Pt will then meet with Dr. Bardales at 3:40pm.    LEEANN will give a reminder call on 4/12/19.    TALIB Mackey   Ambulatory Care Coordination [facility]  Phone: [phone]  Email: [email or web address]  4/4/2019 9:47 AM    4/12/19  SW left vm to remind pt of upcoming appointment 4/15/2019 at 2:45pm with this LEEANN and Dr. Bardales at 3:40pm.    TALIB Mackey   [facility]           gradual onset

## 2022-04-08 NOTE — MR AVS SNAPSHOT
After Visit Summary   10/4/2018    Uri Valladares    MRN: [id number]           Patient Information     Date Of Birth          1990        Visit Information        Provider Department      10/4/2018 2:20 PM Sera Bardales MD [facility]        Today's Diagnoses     Opioid withdrawal (H)    -  1    Episode of recurrent major depressive disorder, unspecified depression episode severity (H)        Drug-induced constipation        Former smoker        High risk medication use           Follow-ups after your visit        Who to contact     If you have questions or need follow up information about today's clinic visit or your schedule please contact [facility] directly at [phone].  Normal or non-critical lab and imaging results will be communicated to you by MyChart, letter or phone within 4 business days after the clinic has received the results. If you do not hear from us within 7 days, please contact the clinic through MyChart or phone. If you have a critical or abnormal lab result, we will notify you by phone as soon as possible.  Submit refill requests through [employer] or call your pharmacy and they will forward the refill request to us. Please allow 3 business days for your refill to be completed.          Additional Information About Your Visit        Care EveryWhere ID     This is your Care EveryWhere ID. This could be used by other organizations to access your [place] medical records  [id number]        Your Vitals Were     Pulse Temperature Respirations Pulse Oximetry BMI (Body Mass Index)       72 98.5  F (36.9  C) (Oral) 14 100% 21.83 kg/m2        Blood Pressure from Last 3 Encounters:   10/04/18 112/74   09/06/18 100/78   08/09/18 128/74    Weight from Last 3 Encounters:   10/04/18 170 lb (77.1 kg)   09/06/18 165 lb (74.8 kg)   08/09/18 166 lb (75.3 kg)              We Performed the Following     Urine Drugs of Abuse  Screen Panel 13          Where to get your medicines      Some of these will need a paper prescription and others can be bought over the counter.  Ask your nurse if you have questions.     Bring a paper prescription for each of these medications     buprenorphine-naloxone 8-2 MG Subl sublingual tablet          Primary Care Provider Fax #    Physician No Ref-Primary [phone]       No address on file        Equal Access to Services     NEAL GARCIA : Hadii cesar ku hadasho Soomaali, waaxda luqadaha, qaybta kaalmada adebebeda, bear bonilla tobijose freedman crismaude moraes . So [facility] [phone].    ATENCIÓN: Si habla español, tiene a dutta disposición servicios gratuitos de asistencia lingüística. Llame al [phone].    We comply with applicable federal civil rights laws and [place] laws. We do not discriminate on the basis of race, color, national origin, age, disability, sex, sexual orientation, or gender identity.            Thank you!     Thank you for choosing [facility]  for your care. Our goal is always to provide you with excellent care. Hearing back from our patients is one way we can continue to improve our services. Please take a few minutes to complete the written survey that you may receive in the mail after your visit with us. Thank you!             Your Updated Medication List - Protect others around you: Learn how to safely use, store and throw away your medicines at www.disposemymeds.org.          This list is accurate as of 10/4/18  7:23 PM.  Always use your most recent med list.                   Brand Name Dispense Instructions for use Diagnosis    buprenorphine-naloxone 8-2 MG Subl sublingual tablet    SUBOXONE    60 each    One tab daily am and 1 tab daily q pm.    Opioid withdrawal (H)       * ketoconazole 2 % cream    NIZORAL    60 g    Apply topically 2 times daily    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       * ketoconazole 2 % shampoo    NIZORAL    120 mL     Apply to the affected area and wash off after 5 minutes.    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       loratadine 10 MG tablet    CLARITIN     Take 10 mg by mouth daily as needed for allergies        melatonin 3 MG Caps     60 capsule    Take 6 mg by mouth At Bedtime    Opioid withdrawal (H)       MELATONIN PO      Take 3 mg by mouth nightly as needed        mupirocin 2 % ointment    BACTROBAN    22 g    Place in nose 2 x day until sx resolve    Skin irritation       nicotine 7 MG/24HR 24 hr patch    NICODERM CQ    30 patch    Place 1 patch onto the skin every 24 hours    Former smoker       nicotine polacrilex 2 MG lozenge    NICOTINE MINI    360 lozenge    Place 1 lozenge (2 mg) inside cheek every hour as needed for smoking cessation    Former smoker       phenol-menthol 14.5 MG lozenge      Place 1 lozenge inside cheek every 2 hours as needed for moderate pain        polyethylene glycol powder    MIRALAX    510 g    Take 17 g (1 capful) by mouth daily    Drug-induced constipation       senna-docusate 8.6-50 MG per tablet    SENOKOT-S;PERICOLACE     Take 2 tablets by mouth daily as needed for constipation        * Notice:  This list has 2 medication(s) that are the same as other medications prescribed for you. Read the directions carefully, and ask your doctor or other care provider to review them with you.       General
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
